# Patient Record
Sex: FEMALE | Race: WHITE | NOT HISPANIC OR LATINO | Employment: FULL TIME | URBAN - METROPOLITAN AREA
[De-identification: names, ages, dates, MRNs, and addresses within clinical notes are randomized per-mention and may not be internally consistent; named-entity substitution may affect disease eponyms.]

---

## 2017-01-06 ENCOUNTER — TELEPHONE (OUTPATIENT)
Dept: FAMILY MEDICINE | Facility: CLINIC | Age: 52
End: 2017-01-06

## 2017-01-06 DIAGNOSIS — Z12.31 ENCOUNTER FOR SCREENING MAMMOGRAM FOR BREAST CANCER: Primary | ICD-10-CM

## 2017-01-06 NOTE — TELEPHONE ENCOUNTER
----- Message from Marisol Membreno sent at 1/4/2017  3:14 PM CST -----  Contact: Self  Pt called to request order for mammogram. Pt wants to have mammo done at [facility]. Pt can be reached @ [phone].

## 2017-01-16 ENCOUNTER — HOSPITAL ENCOUNTER (OUTPATIENT)
Dept: RADIOLOGY | Facility: HOSPITAL | Age: 52
Discharge: HOME | End: 2017-01-16
Attending: FAMILY MEDICINE
Payer: COMMERCIAL

## 2017-01-16 DIAGNOSIS — Z12.31 ENCOUNTER FOR SCREENING MAMMOGRAM FOR BREAST CANCER: ICD-10-CM

## 2017-01-16 PROCEDURE — 77067 SCR MAMMO BI INCL CAD: CPT | Mod: 26,,, | Performed by: RADIOLOGY

## 2017-01-16 PROCEDURE — 77067 SCR MAMMO BI INCL CAD: CPT | Mod: TC

## 2017-01-16 PROCEDURE — 77063 BREAST TOMOSYNTHESIS BI: CPT | Mod: 26,,, | Performed by: RADIOLOGY

## 2017-01-18 ENCOUNTER — HOSPITAL ENCOUNTER (OUTPATIENT)
Dept: RADIOLOGY | Facility: HOSPITAL | Age: 52
Discharge: HOME | End: 2017-01-18
Attending: FAMILY MEDICINE
Payer: COMMERCIAL

## 2017-01-18 DIAGNOSIS — R92.8 ABNORMAL MAMMOGRAM: ICD-10-CM

## 2017-01-18 PROCEDURE — 77061 BREAST TOMOSYNTHESIS UNI: CPT | Mod: 26,,, | Performed by: RADIOLOGY

## 2017-01-18 PROCEDURE — 77065 DX MAMMO INCL CAD UNI: CPT | Mod: 26,,, | Performed by: RADIOLOGY

## 2017-01-18 PROCEDURE — 77065 DX MAMMO INCL CAD UNI: CPT | Mod: TC

## 2017-01-18 PROCEDURE — 77061 BREAST TOMOSYNTHESIS UNI: CPT | Mod: TC

## 2017-01-18 PROCEDURE — 76642 ULTRASOUND BREAST LIMITED: CPT | Mod: TC,RT

## 2017-01-18 PROCEDURE — 76642 ULTRASOUND BREAST LIMITED: CPT | Mod: 26,RT,, | Performed by: RADIOLOGY

## 2017-01-30 DIAGNOSIS — M54.16 RIGHT LUMBAR RADICULOPATHY: ICD-10-CM

## 2017-01-30 RX ORDER — OMEPRAZOLE 40 MG/1
CAPSULE, DELAYED RELEASE ORAL
Qty: 30 CAPSULE | Refills: 0 | Status: SHIPPED | OUTPATIENT
Start: 2017-01-30 | End: 2017-03-01 | Stop reason: SDUPTHER

## 2017-01-30 RX ORDER — GABAPENTIN 600 MG/1
TABLET ORAL
Qty: 90 TABLET | Refills: 1 | Status: SHIPPED | OUTPATIENT
Start: 2017-01-30 | End: 2017-03-31 | Stop reason: SDUPTHER

## 2017-02-16 ENCOUNTER — OFFICE VISIT (OUTPATIENT)
Dept: PHYSICAL MEDICINE AND REHAB | Facility: CLINIC | Age: 52
End: 2017-02-16
Payer: COMMERCIAL

## 2017-02-16 VITALS
DIASTOLIC BLOOD PRESSURE: 87 MMHG | WEIGHT: 180.75 LBS | HEIGHT: 64 IN | SYSTOLIC BLOOD PRESSURE: 135 MMHG | BODY MASS INDEX: 30.86 KG/M2 | HEART RATE: 59 BPM

## 2017-02-16 DIAGNOSIS — G89.29 CHRONIC MIDLINE LOW BACK PAIN WITH RIGHT-SIDED SCIATICA: Primary | ICD-10-CM

## 2017-02-16 DIAGNOSIS — M51.26 PROTRUDED LUMBAR DISC: ICD-10-CM

## 2017-02-16 DIAGNOSIS — M54.16 RIGHT LUMBAR RADICULOPATHY: ICD-10-CM

## 2017-02-16 DIAGNOSIS — M54.41 CHRONIC MIDLINE LOW BACK PAIN WITH RIGHT-SIDED SCIATICA: Primary | ICD-10-CM

## 2017-02-16 PROCEDURE — 3079F DIAST BP 80-89 MM HG: CPT | Mod: S$GLB,,, | Performed by: PHYSICAL MEDICINE & REHABILITATION

## 2017-02-16 PROCEDURE — 99999 PR PBB SHADOW E&M-EST. PATIENT-LVL III: CPT | Mod: PBBFAC,,, | Performed by: PHYSICAL MEDICINE & REHABILITATION

## 2017-02-16 PROCEDURE — 99214 OFFICE O/P EST MOD 30 MIN: CPT | Mod: S$GLB,,, | Performed by: PHYSICAL MEDICINE & REHABILITATION

## 2017-02-16 PROCEDURE — 3075F SYST BP GE 130 - 139MM HG: CPT | Mod: S$GLB,,, | Performed by: PHYSICAL MEDICINE & REHABILITATION

## 2017-02-16 NOTE — PROGRESS NOTES
Subjective:       Patient ID: Brianna Coughlin is a 51 y.o. female.    Chief Complaint: No chief complaint on file.    HPI     HISTORY OF PRESENT ILLNESS:  The patient is a 51-year-old white female who is   followed up in the [facility] Medicine Clinic for chronic low back pain with right   lumbar radiculopathy.  Her last visit was on 12/05/2016.  Her pain was getting   worse.  An MRI of the lumbar spine was ordered.  She was maintained on   gabapentin, p.r.n. Tylenol and p.r.n. baclofen.    The patient is coming today to the clinic for followup.  Her pain has been   waxing and waning, but recently worse.  It is an intermittent aching pain in the   lumbar spine and across her back.  She has occasional radiation to the right   foot with numbness.  Her pain is worse with activity and better with rest.  Her   maximum pain is 7-8/10 and minimum 2-3/10.  Today, it is 2-3/10.  The patient   denies any lower extremity weakness.  She denies any bowel or bladder   incontinence.    She is currently taking gabapentin 600 mg p.o. t.i.d.  She takes Tylenol 650 mg   p.r.n., usually a couple of times per day.  She took baclofen only once, but had   to stop it secondary to feeling weird.  In the past, she was on Cymbalta, but   had to stop it also secondary to mood changes.      MS/HN  dd: 02/16/2017 17:28:01 (CST)  td: 02/17/2017 06:50:28 (CST)  Doc ID   #[id number]  Job ID #[id number]    CC:           Review of Systems   Constitutional: Negative for fatigue.   Respiratory: Negative for shortness of breath.    Cardiovascular: Negative for chest pain.   Gastrointestinal: Negative for blood in stool, constipation, nausea and vomiting.   Genitourinary: Negative for difficulty urinating.   Musculoskeletal: Positive for back pain. Negative for arthralgias, gait problem and neck pain.   Skin: Negative for rash.   Neurological: Negative for dizziness and headaches.   Psychiatric/Behavioral: Positive for sleep disturbance. Negative for  behavioral problems.       Objective:      Physical Exam   Constitutional: She appears well-developed and well-nourished. No distress.   HENT:   Head: Normocephalic and atraumatic.   Neck: Normal range of motion.   Musculoskeletal:   BLE:  ROM:full.  - ve bilateral knee crepitus.   Strength:      RLE: 5/5 at hip flexion, knee extension, ankle DF/PF, EHL.     LLE:  5/5 at hip flexion, knee extension, ankle DF/PF, EHL.  Sensation to pinprick:     RLE: intact.      LLE: intact.   SLR:      RLE: +ve at 30 deg.      LLE: -ve at 70 deg.     Mild tenderness over lumbar spine.    Gait: WNL.   Neurological: She is alert.   Skin: Skin is warm.   Psychiatric: She has a normal mood and affect.   Vitals reviewed.        IMAGING STUDIES:    MRI OF THE LUMBAR SPINE WITHOUT CONTRAST (12/12/16):     Technique:  Sagittal T1, sagittal T2, sagittal STIR, axial T1 and axial T2 weighted images of the lumbar spine obtained without contrast.     Comparison: None.     Findings:    Lumbar spine alignment is within normal limits. The vertebral body heights are well maintained, with no fracture.  No marrow signal abnormality suspicious for an infiltrative process.      The conus is normal in appearance, and terminates at the L1-L2 level.   The common bile duct dilated to approximately 0.8 0.9 cm. The common bile duct has been noted to be prominent on prior imaging examinations.  Remaining soft tissue structures are unremarkable.    There is mild degenerative change of the lumbar spine which will be described on a level by level basis below:    T12-L1, L1-L2, L2-L3: Mild facet arthropathy. No significant spinal canal or neuroforaminal narrowing.    L4-L5: Redemonstration of possible left lateral disc protrusion. Bilateral facet arthropathy. No significant spinal canal stenosis right neural foraminal narrowing. There is mild left neural foraminal narrowing. Overall, findings similar to prior study.    L5-S1: Mild facet arthropathy. No  significant spinal canal or neural foraminal narrowing.    Impression      Mild left lateral disc protrusion at the L4-L5 level with mild left neuroforaminal narrowing, similar to prior examination of 2014.    Dilatation of the common bile duct, similar to prior examinations.          Assessment:       1. Chronic midline low back pain with right-sided sciatica    2. Right lumbar radiculopathy    3. Protruded lumbar disc (at Left L4-5)        Plan:       - MRI findings were discussed with the patient.  - Increase acetaminophen (TYLENOL) 650 MG tablet; Take 1 tablets (350 mg total) by mouth 3-4 times daily as needed for Pain.  - Increase gabapentin (NEURONTIN) 600 MG tablet; Take 1 tablet (600 mg total) by mouth qam, 1 qpm, 2 qhs.  - Discontinue baclofen (due to 'feeling weird' and mood change).  - Regular home exercise program was encouraged.  - Return in about 3 months (around 5/16/2017).    This was a 25 minute visit, more than 50% of which was spent counseling the patient about the diagnosis, the MRI finidings and the treatment plan including medication adjustment and possible referral for NICKO.

## 2017-03-01 DIAGNOSIS — I10 ESSENTIAL HYPERTENSION: ICD-10-CM

## 2017-03-01 DIAGNOSIS — G43.909 MIGRAINE SYNDROME: ICD-10-CM

## 2017-03-01 RX ORDER — NADOLOL 80 MG/1
TABLET ORAL
Qty: 30 TABLET | Refills: 0 | Status: SHIPPED | OUTPATIENT
Start: 2017-03-01 | End: 2017-04-04 | Stop reason: SDUPTHER

## 2017-03-01 RX ORDER — OMEPRAZOLE 40 MG/1
CAPSULE, DELAYED RELEASE ORAL
Qty: 30 CAPSULE | Refills: 0 | Status: SHIPPED | OUTPATIENT
Start: 2017-03-01 | End: 2017-04-04 | Stop reason: SDUPTHER

## 2017-03-03 LAB
HPV MRNA E6/E7: NOT DETECTED
PAP SMEAR: NORMAL

## 2017-03-07 ENCOUNTER — OFFICE VISIT (OUTPATIENT)
Dept: FAMILY MEDICINE | Facility: CLINIC | Age: 52
End: 2017-03-07
Payer: COMMERCIAL

## 2017-03-07 VITALS
OXYGEN SATURATION: 95 % | DIASTOLIC BLOOD PRESSURE: 82 MMHG | BODY MASS INDEX: 30.93 KG/M2 | HEART RATE: 56 BPM | TEMPERATURE: 98 F | WEIGHT: 181.19 LBS | HEIGHT: 64 IN | SYSTOLIC BLOOD PRESSURE: 120 MMHG

## 2017-03-07 DIAGNOSIS — K04.7 DENTAL ABSCESS: Primary | ICD-10-CM

## 2017-03-07 PROCEDURE — 1160F RVW MEDS BY RX/DR IN RCRD: CPT | Mod: S$GLB,,, | Performed by: FAMILY MEDICINE

## 2017-03-07 PROCEDURE — 3079F DIAST BP 80-89 MM HG: CPT | Mod: S$GLB,,, | Performed by: FAMILY MEDICINE

## 2017-03-07 PROCEDURE — 3074F SYST BP LT 130 MM HG: CPT | Mod: S$GLB,,, | Performed by: FAMILY MEDICINE

## 2017-03-07 PROCEDURE — 99999 PR PBB SHADOW E&M-EST. PATIENT-LVL III: CPT | Mod: PBBFAC,,, | Performed by: FAMILY MEDICINE

## 2017-03-07 PROCEDURE — 99214 OFFICE O/P EST MOD 30 MIN: CPT | Mod: S$GLB,,, | Performed by: FAMILY MEDICINE

## 2017-03-07 RX ORDER — AZITHROMYCIN 250 MG/1
TABLET, FILM COATED ORAL
Qty: 6 TABLET | Refills: 0 | Status: SHIPPED | OUTPATIENT
Start: 2017-03-07 | End: 2017-03-12

## 2017-03-07 NOTE — PROGRESS NOTES
" Office Visit    Patient Name: Brianna Coughlin    : 1965  MRN: [id number]    Subjective:  Brianna is a 51 y.o. female who presents today for:    Sinus Problem      This patient has multiple medical diagnoses as noted below.  This patient is new to me, but known to the clinic.  She reports sinus issues.  She has had numbness and tingling on the left side of her face. It does not feel right. No prior episodes.  She reports a "weird sensation.  She reports she does have sinus issues.  She used sudafed on .  She has had these symptoms for about a week.  No facial weakness. Sensation is the same on both sides.  +facial swelling that occurred this AM (puffy).  She has nasal congestion and nasal dripping.  +sneezing.  No noted drainage from the ear.        Active Problem List  Patient Active Problem List   Diagnosis    Undiagnosed cardiac murmurs    Lumbago    Anxiety    GERD (gastroesophageal reflux disease)    Personal history of cervical dysplasia    Fibroids    Dermatophytosis of nail    Protruded lumbar disc (at Left L4-5)    Right lumbar radiculopathy    Chronic low back pain    Fibromyalgia    Essential hypertension    Migraine syndrome    FHx: coronary artery disease    H/O: duodenal ulcer    Ang's neuroma of left foot 2nd space       Past Surgical History  Past Surgical History:   Procedure Laterality Date    CHOLECYSTECTOMY      conization of cervix      TUBAL LIGATION         Family History  Family History   Problem Relation Age of Onset    Hypertension Mother     Heart disease Mother     Heart disease Father     Stroke Maternal Grandfather        Social History  Social History     Social History    Marital status: Single     Spouse name: N/A    Number of children: N/A    Years of education: N/A     Occupational History    Not on file.     Social History Main Topics    Smoking status: Never Smoker    Smokeless tobacco: Never Used    Alcohol use 0.0 oz/week     " 0 Standard drinks or equivalent per week      Comment: socially    Drug use: No    Sexual activity: Not Currently     Partners: Male     Other Topics Concern    Not on file     Social History Narrative       Current Medications  Medications reviewed and updated.     Allergies   Review of patient's allergies indicates:   Allergen Reactions    Acetaminophen-codeine Other (See Comments)     Stomach pains    Mobic [meloxicam] Other (See Comments)     STOMACH ULCERS    Iodine Other (See Comments)    Penicillins Nausea And Vomiting    Sulfa (sulfonamide antibiotics) Nausea And Vomiting     Other reaction(s): Rash    Unable to assess      MEDICINES CONTAINING ASPIRIN AFFECT HER STOMACH/STOMACH ULCER       Review of Systems (Pertinent positives)  Review of Systems   Constitutional: Negative for activity change, appetite change, fatigue, fever and unexpected weight change.   HENT: Positive for congestion, ear pain, facial swelling, postnasal drip, sinus pressure and sneezing. Negative for ear discharge, hearing loss, nosebleeds, rhinorrhea and sore throat.    Eyes: Negative.    Respiratory: Negative for apnea, cough, chest tightness, shortness of breath and wheezing.    Cardiovascular: Negative for chest pain, palpitations and leg swelling.   Gastrointestinal: Negative for abdominal distention, abdominal pain, constipation, diarrhea and vomiting.   Endocrine: Negative for cold intolerance, heat intolerance, polydipsia and polyuria.   Genitourinary: Negative for decreased urine volume, menstrual problem, urgency, vaginal bleeding, vaginal discharge and vaginal pain.   Musculoskeletal: Negative.    Skin: Negative for rash.   Neurological: Negative for dizziness and headaches.   Hematological: Does not bruise/bleed easily.   Psychiatric/Behavioral: Negative for agitation, sleep disturbance and suicidal ideas.       /82 (BP Location: Left arm, Patient Position: Sitting, BP Method: Manual)  Pulse (!) 56  Temp  "98.2 °F (36.8 °C) (Oral)   Ht 5' 4" (1.626 m)  Wt 82.2 kg (181 lb 3.5 oz)  SpO2 95%  BMI 31.11 kg/m2    Physical Exam   Constitutional: She is oriented to person, place, and time. She appears well-developed and well-nourished.   HENT:   Head: Normocephalic and atraumatic.   Right Ear: External ear normal.   Left Ear: External ear normal.   Nose: Nose normal.   Mouth/Throat: Oropharynx is clear and moist. Dental abscesses and dental caries present.       Eyes: Conjunctivae and EOM are normal. Pupils are equal, round, and reactive to light.   Neurological: She is alert and oriented to person, place, and time.   Skin: Skin is warm and dry.   Psychiatric: She has a normal mood and affect. Her behavior is normal.   Vitals reviewed.      Health Maintenance  Health Maintenance Topics with due status: Not Due       Topic Last Completion Date    Pap Smear 12/12/2014    Lipid Panel 02/23/2016    Mammogram 01/18/2017       Assessment/Plan:  Brianna Coughlin is a 51 y.o. female who presents today for :    Brianna was seen today for sinus problem.    Diagnoses and all orders for this visit:    Dental abscess  -     azithromycin (Z-ANDRÉS) 250 MG tablet; Take 2 tablets by mouth on day 1; Take 1 tablet by mouth on days 2-5  -    I have discussed the common side effects of this medication with the patient and answered all of the questions they had at the time of this visit regarding this medication.  -  RTC if symptoms worsen or persist.      No Follow-up on file.  "

## 2017-03-07 NOTE — MR AVS SNAPSHOT
[facility]  [address]  [place] [address]  Phone: [phone]  Fax: [phone]                  Brianna Couglhin   3/7/2017 10:00 AM   Office Visit    Description:  Female : 1965   Provider:  Yas Mcclendon MD   Department:  [facility]           Reason for Visit     Sinus Problem           Diagnoses this Visit        Comments    Dental abscess    -  Primary            To Do List           Future Appointments        Provider Department Dept Phone    2017 4:00 PM eBlle Fall MD [facility] [phone]      Goals (5 Years of Data)     None       These Medications        Disp Refills Start End    azithromycin (Z-ANDRÉS) 250 MG tablet 6 tablet 0 3/7/2017 3/12/2017    Take 2 tablets by mouth on day 1; Take 1 tablet by mouth on days 2-5    Pharmacy: [address] [place] Ph #: [phone]         [facility] On Call     [facility] On Call Nurse Care Line -  Assistance  Registered nurses in the [facility] On Call Center provide clinical advisement, health education, appointment booking, and other advisory services.  Call for this free service at [phone].             Medications           Message regarding Medications     Verify the changes and/or additions to your medication regime listed below are the same as discussed with your clinician today.  If any of these changes or additions are incorrect, please notify your healthcare provider.        START taking these NEW medications        Refills    azithromycin (Z-ANDRÉS) 250 MG tablet 0    Sig: Take 2 tablets by mouth on day 1; Take 1 tablet by mouth on days 2-5    Class: Normal           Verify that the below list of medications is an accurate representation of the medications you are currently taking.  If none reported, the list may be blank. If incorrect, please contact your healthcare provider. Carry this list with you in case of  "emergency.           Current Medications     acetaminophen (TYLENOL) 500 MG tablet Take 1-2 tablets (500-1,000 mg total) by mouth 3 (three) times daily as needed for Pain.    alprazolam (XANAX) 0.5 MG tablet TAKE ONE TABLET AS DIRECTED AS NEEDED    gabapentin (NEURONTIN) 600 MG tablet TAKE ONE TABLET BY MOUTH THREE TIMES DAILY    nadolol (CORGARD) 80 MG tablet TAKE ONE TABLET BY MOUTH DAILY    omeprazole (PRILOSEC) 40 MG capsule TAKE ONE CAPSULE BY MOUTH DAILY    azithromycin (Z-ANDRÉS) 250 MG tablet Take 2 tablets by mouth on day 1; Take 1 tablet by mouth on days 2-5    baclofen (LIORESAL) 10 MG tablet Take 1 tablet (10 mg total) by mouth 3 (three) times daily as needed (muscle spasms).    calcium carb &cit-vit D3 (CITRACAL + D SLOW RELEASE) 600 mg calcium- 500 unit TbSR Take 1 tablet by mouth Daily.    cetirizine (ZYRTEC) 10 MG tablet Take 10 mg by mouth once daily.    diclofenac sodium 1 % Gel Apply 2 g topically 4 (four) times daily.           Clinical Reference Information           Your Vitals Were     BP Pulse Temp Height Weight SpO2    120/82 (BP Location: Left arm, Patient Position: Sitting, BP Method: Manual) 56 98.2 °F (36.8 °C) (Oral) 5' 4" (1.626 m) 82.2 kg (181 lb 3.5 oz) 95%    BMI                31.11 kg/m2          Blood Pressure          Most Recent Value    BP  120/82      Allergies as of 3/7/2017     Acetaminophen-codeine    Mobic [Meloxicam]    Iodine    Penicillins    Sulfa (Sulfonamide Antibiotics)    Unable To Assess      Immunizations Administered on Date of Encounter - 3/7/2017     None      [facility] Sign-Up     Activating your [facility] account is as easy as 1-2-3!     1) Visit [email or web address], select Sign Up Now, enter this activation code and your date of birth, then select Next.  [email or web address]  Expires: 4/21/2017 10:42 AM      2) Create a username and password to use when you visit [facility] in the future and select a security question in case you lose your password and select " Next.    3) Enter your e-mail address and click Sign Up!    Additional Information  If you have questions, please e-mail [email or web address] or call [phone] to talk to our [facility] staff. Remember, [facility] is NOT to be used for urgent needs. For medical emergencies, dial 911.         Language Assistance Services     ATTENTION: Language assistance services are available, free of charge. Please call [phone].      ATENCIÓN: Si habla abidatamiko, tiene a rubio disposición servicios gratuitos de asistencia lingüística. Llame al [phone].     CHÚ Ý: N?u b?n nói Ti?ng Vi?t, có các d?ch v? h? tr? ngôn ng? mi?n phí dành cho b?n. G?i s? [phone].         [facility] complies with applicable Federal civil rights laws and does not discriminate on the basis of race, color, national origin, age, disability, or sex.

## 2017-03-31 DIAGNOSIS — M54.16 RIGHT LUMBAR RADICULOPATHY: ICD-10-CM

## 2017-03-31 RX ORDER — GABAPENTIN 600 MG/1
TABLET ORAL
Qty: 90 TABLET | Refills: 1 | Status: SHIPPED | OUTPATIENT
Start: 2017-03-31 | End: 2017-06-01 | Stop reason: SDUPTHER

## 2017-04-04 DIAGNOSIS — K21.9 GASTROESOPHAGEAL REFLUX DISEASE, ESOPHAGITIS PRESENCE NOT SPECIFIED: Primary | ICD-10-CM

## 2017-04-04 DIAGNOSIS — G43.909 MIGRAINE SYNDROME: ICD-10-CM

## 2017-04-04 DIAGNOSIS — I10 ESSENTIAL HYPERTENSION: ICD-10-CM

## 2017-04-04 NOTE — TELEPHONE ENCOUNTER
----- Message from Jennifer Larsen sent at 4/4/2017  1:08 PM CDT -----  Contact: Self  Pt calling to speak to a nurse regarding the medication below. Please call [phone].    omeprazole (PRILOSEC) 40 MG capsule  nadolol (CORGARD) 80 MG tablet

## 2017-04-05 RX ORDER — NADOLOL 80 MG/1
80 TABLET ORAL DAILY
Qty: 30 TABLET | Refills: 3 | Status: SHIPPED | OUTPATIENT
Start: 2017-04-05 | End: 2017-08-17 | Stop reason: SDUPTHER

## 2017-04-05 RX ORDER — OMEPRAZOLE 40 MG/1
40 CAPSULE, DELAYED RELEASE ORAL DAILY
Qty: 30 CAPSULE | Refills: 3 | Status: SHIPPED | OUTPATIENT
Start: 2017-04-05 | End: 2017-08-17 | Stop reason: SDUPTHER

## 2017-06-01 DIAGNOSIS — M54.16 RIGHT LUMBAR RADICULOPATHY: ICD-10-CM

## 2017-06-01 RX ORDER — GABAPENTIN 600 MG/1
TABLET ORAL
Qty: 90 TABLET | Refills: 1 | Status: SHIPPED | OUTPATIENT
Start: 2017-06-01 | End: 2017-08-03 | Stop reason: SDUPTHER

## 2017-08-03 DIAGNOSIS — M54.16 RIGHT LUMBAR RADICULOPATHY: ICD-10-CM

## 2017-08-03 RX ORDER — GABAPENTIN 600 MG/1
TABLET ORAL
Qty: 90 TABLET | Refills: 1 | Status: SHIPPED | OUTPATIENT
Start: 2017-08-03 | End: 2017-09-21 | Stop reason: SDUPTHER

## 2017-08-17 DIAGNOSIS — G43.909 MIGRAINE SYNDROME: ICD-10-CM

## 2017-08-17 DIAGNOSIS — I10 ESSENTIAL HYPERTENSION: ICD-10-CM

## 2017-08-17 DIAGNOSIS — K21.9 GASTROESOPHAGEAL REFLUX DISEASE, ESOPHAGITIS PRESENCE NOT SPECIFIED: ICD-10-CM

## 2017-08-17 RX ORDER — NADOLOL 80 MG/1
TABLET ORAL
Qty: 30 TABLET | Refills: 0 | Status: SHIPPED | OUTPATIENT
Start: 2017-08-17 | End: 2017-09-21 | Stop reason: SDUPTHER

## 2017-08-17 RX ORDER — OMEPRAZOLE 40 MG/1
CAPSULE, DELAYED RELEASE ORAL
Qty: 30 CAPSULE | Refills: 0 | Status: SHIPPED | OUTPATIENT
Start: 2017-08-17 | End: 2017-09-21 | Stop reason: SDUPTHER

## 2017-09-21 DIAGNOSIS — I10 ESSENTIAL HYPERTENSION: ICD-10-CM

## 2017-09-21 DIAGNOSIS — G43.909 MIGRAINE SYNDROME: ICD-10-CM

## 2017-09-21 DIAGNOSIS — M54.16 RIGHT LUMBAR RADICULOPATHY: ICD-10-CM

## 2017-09-21 DIAGNOSIS — K21.9 GASTROESOPHAGEAL REFLUX DISEASE, ESOPHAGITIS PRESENCE NOT SPECIFIED: ICD-10-CM

## 2017-09-21 RX ORDER — GABAPENTIN 600 MG/1
TABLET ORAL
Qty: 90 TABLET | Refills: 1 | Status: SHIPPED | OUTPATIENT
Start: 2017-09-21 | End: 2017-10-02 | Stop reason: SDUPTHER

## 2017-09-22 RX ORDER — OMEPRAZOLE 40 MG/1
CAPSULE, DELAYED RELEASE ORAL
Qty: 30 CAPSULE | Refills: 0 | Status: SHIPPED | OUTPATIENT
Start: 2017-09-22 | End: 2017-10-19 | Stop reason: SDUPTHER

## 2017-09-22 RX ORDER — NADOLOL 80 MG/1
TABLET ORAL
Qty: 30 TABLET | Refills: 0 | Status: SHIPPED | OUTPATIENT
Start: 2017-09-22 | End: 2017-10-19 | Stop reason: SDUPTHER

## 2017-10-02 ENCOUNTER — TELEPHONE (OUTPATIENT)
Dept: PHYSICAL MEDICINE AND REHAB | Facility: CLINIC | Age: 52
End: 2017-10-02

## 2017-10-02 DIAGNOSIS — M54.16 RIGHT LUMBAR RADICULOPATHY: ICD-10-CM

## 2017-10-02 RX ORDER — GABAPENTIN 600 MG/1
600 TABLET ORAL 4 TIMES DAILY
Qty: 120 TABLET | Refills: 1 | Status: SHIPPED | OUTPATIENT
Start: 2017-10-02 | End: 2017-11-20 | Stop reason: SDUPTHER

## 2017-10-02 NOTE — TELEPHONE ENCOUNTER
----- Message from Belle Fall MD sent at 10/2/2017  4:02 PM CDT -----  Contact: self  Pls let her know I sent a refill for 120 tablets to [facility].  ----- Message -----  From: Yen Cornelius MA  Sent: 10/2/2017   2:06 PM  To: Belle Fall MD        ----- Message -----  From: Stu Whitfield  Sent: 10/2/2017  11:57 AM  To: Juan [facility] Staff    Pt is requesting qty to be changed on medication  gabapentin (NEURONTIN) 600 MG tablet per pt currently she TAKE ONE TABLET BY MOUTH FOUR TIMES DAILY and would like to have qty to change from 90 tabs to 120 tabs.. Pt pharmacy is  [id number] - [place], [place] - [address].....the patient can be reached at [phone]

## 2017-10-05 ENCOUNTER — TELEPHONE (OUTPATIENT)
Dept: PHYSICAL MEDICINE AND REHAB | Facility: CLINIC | Age: 52
End: 2017-10-05

## 2017-10-05 ENCOUNTER — HOSPITAL ENCOUNTER (OUTPATIENT)
Dept: RADIOLOGY | Facility: HOSPITAL | Age: 52
Discharge: HOME | End: 2017-10-05
Attending: NURSE PRACTITIONER
Payer: COMMERCIAL

## 2017-10-05 ENCOUNTER — OFFICE VISIT (OUTPATIENT)
Dept: FAMILY MEDICINE | Facility: CLINIC | Age: 52
End: 2017-10-05
Payer: OTHER MISCELLANEOUS

## 2017-10-05 VITALS
BODY MASS INDEX: 31.27 KG/M2 | DIASTOLIC BLOOD PRESSURE: 88 MMHG | TEMPERATURE: 98 F | SYSTOLIC BLOOD PRESSURE: 124 MMHG | HEART RATE: 58 BPM | WEIGHT: 183.19 LBS | OXYGEN SATURATION: 97 % | HEIGHT: 64 IN

## 2017-10-05 DIAGNOSIS — S39.92XA INJURY OF LOW BACK, INITIAL ENCOUNTER: Primary | ICD-10-CM

## 2017-10-05 DIAGNOSIS — S30.0XXA LUMBAR CONTUSION, INITIAL ENCOUNTER: ICD-10-CM

## 2017-10-05 DIAGNOSIS — S39.92XA INJURY OF LOW BACK, INITIAL ENCOUNTER: ICD-10-CM

## 2017-10-05 PROCEDURE — 99214 OFFICE O/P EST MOD 30 MIN: CPT | Mod: S$GLB,,, | Performed by: NURSE PRACTITIONER

## 2017-10-05 PROCEDURE — 99999 PR PBB SHADOW E&M-EST. PATIENT-LVL IV: CPT | Mod: PBBFAC,,, | Performed by: NURSE PRACTITIONER

## 2017-10-05 PROCEDURE — 72110 X-RAY EXAM L-2 SPINE 4/>VWS: CPT | Mod: 26,,, | Performed by: RADIOLOGY

## 2017-10-05 PROCEDURE — 72110 X-RAY EXAM L-2 SPINE 4/>VWS: CPT | Mod: TC,PO

## 2017-10-05 RX ORDER — CYCLOBENZAPRINE HCL 10 MG
10 TABLET ORAL 3 TIMES DAILY PRN
Qty: 30 TABLET | Refills: 0 | Status: SHIPPED | OUTPATIENT
Start: 2017-10-05 | End: 2017-10-15

## 2017-10-05 RX ORDER — TRAMADOL HYDROCHLORIDE 50 MG/1
50 TABLET ORAL EVERY 6 HOURS PRN
Qty: 30 TABLET | Refills: 0 | Status: SHIPPED | OUTPATIENT
Start: 2017-10-05 | End: 2017-10-15

## 2017-10-05 NOTE — PATIENT INSTRUCTIONS
Back Contusion     You have a contusion to your back. A contusion is also called a bruise. There is swelling and some bleeding under the skin. The skin may be purplish. You may have muscle aching and stiffness in the area of the bruise. There are no broken bones.  Contusions heal on their own, without further treatment. However, pain and skin discoloration may take weeks to months to go away.   Home care  · Rest. Avoid heavy lifting, strenuous exertion, or any activity that causes pain.  · Ice the area to reduce pain and swelling. Put ice cubes in a plastic bag or use a cold pack. (Wrap the cold source in a thin towel. Do not place it directly on your skin.) Ice the injured area for 20 minutes every 1-2 hours the first day. Continue with ice packs 3-4 times a day for the next two days, then as needed for the relief of pain and swelling.  · Take any prescribed pain medication. If none was prescribed, take acetaminophen, ibuprofen, or naproxen to control pain.  Follow-up care  Follow up with your healthcare provider, or as directed. Call if you are not better in 1-2 weeks.  When to seek medical advice  Call your healthcare provider for any of the following:  · New or worsening pain  · Increased swelling around the bruise  · Pain spreads to one or both legs  · Weakness or numbness in one or both legs   · Loss of bowel or bladder control  · Numbness in the groin or genital area  · Fever of 100.4°F (38ºC) or higher, or as directed by your healthcare provider  Date Last Reviewed: 6/26/2015 © 2000-2017 [employer]. [address] [place] [address], [place] [address]. All rights reserved. This information is not intended as a substitute for professional medical care. Always follow your healthcare professional's instructions.

## 2017-10-05 NOTE — TELEPHONE ENCOUNTER
----- Message from Yen Cornelius MA sent at 10/5/2017 10:53 AM CDT -----  Contact: self @ [phone]      ----- Message -----  From: Justyna Boyd  Sent: 10/5/2017   8:55 AM  To: Juan [facility] Staff    Pt says she injured herself yesterday and needs to see Dr Fall today.  Pls call asap.
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
## 2017-10-05 NOTE — MEDICAL/APP STUDENT
Subjective:       Patient ID: Brianna Coughlin is a 52 y.o. female.    Chief Complaint: Sciatica    Back Pain   This is a new problem. The current episode started in the past 7 days. The problem occurs constantly. The problem has been gradually worsening since onset. The pain is present in the lumbar spine. The quality of the pain is described as aching. The pain does not radiate. The pain is at a severity of 7/10. The pain is moderate. The pain is the same all the time. The symptoms are aggravated by position and bending. Stiffness is present all day. Associated symptoms include leg pain. Pertinent negatives include no bladder incontinence, bowel incontinence, chest pain, dysuria, headaches, numbness, paresis, paresthesias, pelvic pain, perianal numbness, tingling, weakness or weight loss. She has tried analgesics (gabapentin and tylenol) for the symptoms. The treatment provided mild relief.     Review of Systems   Constitutional: Negative for weight loss.   Respiratory: Negative for cough, shortness of breath and wheezing.    Cardiovascular: Negative for chest pain and palpitations.   Gastrointestinal: Negative for bowel incontinence.   Genitourinary: Negative for bladder incontinence, dysuria, flank pain and pelvic pain.   Musculoskeletal: Positive for back pain. Negative for arthralgias, gait problem, joint swelling, myalgias, neck pain and neck stiffness.   Neurological: Negative for dizziness, tingling, tremors, weakness, light-headedness, numbness, headaches and paresthesias.       Objective:      Physical Exam   Constitutional: She appears well-developed and well-nourished.   HENT:   Head: Normocephalic and atraumatic.   Right Ear: External ear normal.   Left Ear: External ear normal.   Nose: Nose normal.   Mouth/Throat: Uvula is midline, oropharynx is clear and moist and mucous membranes are normal.   Eyes: Conjunctivae, EOM and lids are normal.   Neck: Normal range of motion and full passive range of  motion without pain. Neck supple.   Cardiovascular: Regular rhythm and normal heart sounds.  Bradycardia present.    Pulmonary/Chest: Effort normal and breath sounds normal.   Abdominal: Soft.   Musculoskeletal:        Lumbar back: She exhibits tenderness, bony tenderness and pain. She exhibits normal range of motion, no swelling, no edema, no deformity, no laceration, no spasm and normal pulse.   Neurological: She is alert.   Skin: Skin is warm, dry and intact.   Psychiatric: She has a normal mood and affect.   Nursing note and vitals reviewed.      Assessment:       1. Injury of low back, initial encounter    2. Lumbar contusion, initial encounter      Plan:       Brianna was seen today for sciatica.    Diagnoses and all orders for this visit:    Injury of low back, initial encounter  -     X-Ray Lumbar Spine Complete 5 View; Future  -     cyclobenzaprine (FLEXERIL) 10 MG tablet; Take 1 tablet (10 mg total) by mouth 3 (three) times daily as needed for Muscle spasms.  -     tramadol (ULTRAM) 50 mg tablet; Take 1 tablet (50 mg total) by mouth every 6 (six) hours as needed for Pain.    Lumbar contusion, initial encounter  -     cyclobenzaprine (FLEXERIL) 10 MG tablet; Take 1 tablet (10 mg total) by mouth 3 (three) times daily as needed for Muscle spasms.  -     tramadol (ULTRAM) 50 mg tablet; Take 1 tablet (50 mg total) by mouth every 6 (six) hours as needed for Pain.    Home care  · Rest. Avoid heavy lifting, strenuous exertion, or any activity that causes pain.  · Ice the area to reduce pain and swelling. Put ice cubes in a plastic bag or use a cold pack. (Wrap the cold source in a thin towel. Do not place it directly on your skin.) Ice the injured area for 20 minutes every 1-2 hours the first day. Continue with ice packs 3-4 times a day for the next two days, then as needed for the relief of pain and swelling.  · Take any prescribed pain medication. If none was prescribed, take acetaminophen, ibuprofen, or  naproxen to control pain.  Follow-up care  Follow up with your healthcare provider, or as directed. Call if you are not better in 1-2 weeks.  When to seek medical advice  Call your healthcare provider for any of the following:  · New or worsening pain  · Increased swelling around the bruise  · Pain spreads to one or both legs  · Weakness or numbness in one or both legs   · Loss of bowel or bladder control  · Numbness in the groin or genital area  · Fever of 100.4°F (38ºC) or higher, or as directed by your healthcare provider  Date Last Reviewed: 6/26/2015  © 5009-3542 [employer]. [address] [place] [address], [place] [address]. All rights reserved. This information is not intended as a substitute for professional medical care. Always follow your healthcare professional's instructions.        Return if symptoms worsen or fail to improve.

## 2017-10-11 NOTE — PROGRESS NOTES
Subjective:       Patient ID: Brianna Coughlin is a 52 y.o. female.    Chief Complaint: Sciatica    Back Pain   This is a new problem. The current episode started in the past 7 days. The problem occurs constantly. The problem has been gradually worsening since onset. The pain is present in the lumbar spine. The quality of the pain is described as aching. The pain does not radiate. The pain is at a severity of 7/10. The pain is moderate. The pain is the same all the time. The symptoms are aggravated by position and bending. Stiffness is present all day. Associated symptoms include leg pain. Pertinent negatives include no bladder incontinence, bowel incontinence, chest pain, dysuria, headaches, numbness, paresis, paresthesias, pelvic pain, perianal numbness, tingling, weakness or weight loss. She has tried analgesics (gabapentin and tylenol) for the symptoms. The treatment provided mild relief.     Review of Systems   Constitutional: Negative for weight loss.   Respiratory: Negative for cough, shortness of breath and wheezing.    Cardiovascular: Negative for chest pain and palpitations.   Gastrointestinal: Negative for bowel incontinence.   Genitourinary: Negative for bladder incontinence, dysuria, flank pain and pelvic pain.   Musculoskeletal: Positive for back pain. Negative for arthralgias, gait problem, joint swelling, myalgias, neck pain and neck stiffness.   Neurological: Negative for dizziness, tingling, tremors, weakness, light-headedness, numbness, headaches and paresthesias.       Objective:      Physical Exam   Constitutional: She appears well-developed and well-nourished.   HENT:   Head: Normocephalic and atraumatic.   Right Ear: External ear normal.   Left Ear: External ear normal.   Nose: Nose normal.   Mouth/Throat: Uvula is midline, oropharynx is clear and moist and mucous membranes are normal.   Eyes: Conjunctivae, EOM and lids are normal.   Neck: Normal range of motion and full passive range of  motion without pain. Neck supple.   Cardiovascular: Regular rhythm and normal heart sounds.  Bradycardia present.    Pulmonary/Chest: Effort normal and breath sounds normal.   Abdominal: Soft.   Musculoskeletal:        Lumbar back: She exhibits tenderness, bony tenderness and pain. She exhibits normal range of motion, no swelling, no edema, no deformity, no laceration, no spasm and normal pulse.   Neurological: She is alert.   Skin: Skin is warm, dry and intact.   Psychiatric: She has a normal mood and affect.   Nursing note and vitals reviewed.      Assessment:       1. Injury of low back, initial encounter    2. Lumbar contusion, initial encounter      Plan:       Brianna was seen today for sciatica.    Diagnoses and all orders for this visit:    Injury of low back, initial encounter  -     X-Ray Lumbar Spine Complete 5 View; Future  -     cyclobenzaprine (FLEXERIL) 10 MG tablet; Take 1 tablet (10 mg total) by mouth 3 (three) times daily as needed for Muscle spasms.  -     tramadol (ULTRAM) 50 mg tablet; Take 1 tablet (50 mg total) by mouth every 6 (six) hours as needed for Pain.    Lumbar contusion, initial encounter  -     cyclobenzaprine (FLEXERIL) 10 MG tablet; Take 1 tablet (10 mg total) by mouth 3 (three) times daily as needed for Muscle spasms.  -     tramadol (ULTRAM) 50 mg tablet; Take 1 tablet (50 mg total) by mouth every 6 (six) hours as needed for Pain.    Home care  · Rest. Avoid heavy lifting, strenuous exertion, or any activity that causes pain.  · Ice the area to reduce pain and swelling. Put ice cubes in a plastic bag or use a cold pack. (Wrap the cold source in a thin towel. Do not place it directly on your skin.) Ice the injured area for 20 minutes every 1-2 hours the first day. Continue with ice packs 3-4 times a day for the next two days, then as needed for the relief of pain and swelling.  · Take any prescribed pain medication. If none was prescribed, take acetaminophen, ibuprofen, or  naproxen to control pain.  Follow-up care  Follow up with your healthcare provider, or as directed. Call if you are not better in 1-2 weeks.  When to seek medical advice  Call your healthcare provider for any of the following:  · New or worsening pain  · Increased swelling around the bruise  · Pain spreads to one or both legs  · Weakness or numbness in one or both legs   · Loss of bowel or bladder control  · Numbness in the groin or genital area  · Fever of 100.4°F (38ºC) or higher, or as directed by your healthcare provider  Date Last Reviewed: 6/26/2015  © 4003-1868 [employer]. [address] [place] [address], [place] [address]. All rights reserved. This information is not intended as a substitute for professional medical care. Always follow your healthcare professional's instructions.        Return if symptoms worsen or fail to improve.

## 2017-10-19 DIAGNOSIS — G43.909 MIGRAINE SYNDROME: ICD-10-CM

## 2017-10-19 DIAGNOSIS — I10 ESSENTIAL HYPERTENSION: ICD-10-CM

## 2017-10-19 DIAGNOSIS — K21.9 GASTROESOPHAGEAL REFLUX DISEASE, ESOPHAGITIS PRESENCE NOT SPECIFIED: ICD-10-CM

## 2017-10-20 RX ORDER — OMEPRAZOLE 40 MG/1
CAPSULE, DELAYED RELEASE ORAL
Qty: 30 CAPSULE | Refills: 2 | Status: SHIPPED | OUTPATIENT
Start: 2017-10-20 | End: 2018-02-01 | Stop reason: SDUPTHER

## 2017-10-20 RX ORDER — NADOLOL 80 MG/1
TABLET ORAL
Qty: 30 TABLET | Refills: 2 | Status: SHIPPED | OUTPATIENT
Start: 2017-10-20 | End: 2018-02-01 | Stop reason: SDUPTHER

## 2017-11-03 DIAGNOSIS — G89.29 CHRONIC LOW BACK PAIN: ICD-10-CM

## 2017-11-03 DIAGNOSIS — M54.50 CHRONIC LOW BACK PAIN: ICD-10-CM

## 2017-11-03 DIAGNOSIS — M77.12 LATERAL EPICONDYLITIS OF LEFT ELBOW: ICD-10-CM

## 2017-11-03 RX ORDER — DICLOFENAC SODIUM 10 MG/G
GEL TOPICAL
Qty: 300 G | Refills: 2 | Status: SHIPPED | OUTPATIENT
Start: 2017-11-03 | End: 2018-04-13

## 2017-11-09 RX ORDER — ALPRAZOLAM 0.5 MG/1
TABLET ORAL
Qty: 30 TABLET | Refills: 2 | Status: SHIPPED | OUTPATIENT
Start: 2017-11-09 | End: 2018-12-11 | Stop reason: SDUPTHER

## 2017-11-09 NOTE — TELEPHONE ENCOUNTER
----- Message from Sarah Bowers sent at 11/9/2017  2:27 PM CST -----  Patient is asking if medication ALPRAZolam (XANAX) 0.5 MG tablet could be called into [facility]. Please call at [phone] Thank you!

## 2017-11-10 ENCOUNTER — TELEPHONE (OUTPATIENT)
Dept: FAMILY MEDICINE | Facility: CLINIC | Age: 52
End: 2017-11-10

## 2017-11-10 DIAGNOSIS — B00.1 RECURRENT COLD SORES: Primary | ICD-10-CM

## 2017-11-10 RX ORDER — VALACYCLOVIR HYDROCHLORIDE 1 G/1
2000 TABLET, FILM COATED ORAL EVERY 12 HOURS
Qty: 4 TABLET | Refills: 0 | Status: SHIPPED | OUTPATIENT
Start: 2017-11-10 | End: 2019-08-28

## 2017-11-10 NOTE — TELEPHONE ENCOUNTER
----- Message from Marisol Membreno sent at 11/10/2017  8:42 AM CST -----  Contact: Self  Pt states she has a fever blister and wants to know if she can get a Rx for Valtrex sent to [facility]. Pt can be reached @ [phone].

## 2017-11-20 DIAGNOSIS — M54.16 RIGHT LUMBAR RADICULOPATHY: ICD-10-CM

## 2017-11-20 RX ORDER — GABAPENTIN 600 MG/1
TABLET ORAL
Qty: 120 TABLET | Refills: 1 | Status: SHIPPED | OUTPATIENT
Start: 2017-11-20 | End: 2018-01-12 | Stop reason: SDUPTHER

## 2017-12-27 ENCOUNTER — OFFICE VISIT (OUTPATIENT)
Dept: URGENT CARE | Facility: CLINIC | Age: 52
End: 2017-12-27
Payer: COMMERCIAL

## 2017-12-27 VITALS
DIASTOLIC BLOOD PRESSURE: 87 MMHG | HEART RATE: 98 BPM | HEIGHT: 64 IN | SYSTOLIC BLOOD PRESSURE: 147 MMHG | OXYGEN SATURATION: 99 % | WEIGHT: 183 LBS | RESPIRATION RATE: 18 BRPM | BODY MASS INDEX: 31.24 KG/M2 | TEMPERATURE: 103 F

## 2017-12-27 DIAGNOSIS — J02.9 SORE THROAT: ICD-10-CM

## 2017-12-27 DIAGNOSIS — J11.1 INFLUENZA: Primary | ICD-10-CM

## 2017-12-27 LAB
CTP QC/QA: YES
S PYO RRNA THROAT QL PROBE: NEGATIVE

## 2017-12-27 PROCEDURE — 99214 OFFICE O/P EST MOD 30 MIN: CPT | Mod: S$GLB,,, | Performed by: PHYSICIAN ASSISTANT

## 2017-12-27 PROCEDURE — 87880 STREP A ASSAY W/OPTIC: CPT | Mod: QW,S$GLB,, | Performed by: PHYSICIAN ASSISTANT

## 2017-12-27 RX ORDER — BENZONATATE 100 MG/1
100 CAPSULE ORAL 3 TIMES DAILY PRN
Qty: 30 CAPSULE | Refills: 0 | Status: SHIPPED | OUTPATIENT
Start: 2017-12-27 | End: 2018-04-13

## 2017-12-27 NOTE — PATIENT INSTRUCTIONS
-Tylenol every 4 hours OR ibuprofen every 6 hours as needed for pain/fever.  -Rest and stay hydrated.  -Flonase OTC or Nasacort OTC for nasal congestion.  -Warm face compresses to help with facial sinus pain/pressure.  -Simple foods like chicken noodle soup.  -Delsym helps with coughing at night  -Zyrtec/Claritin during the day & Benadryl at night may help with allergies.    Please follow up with your primary care provider within 2-5 days if your signs and symptoms have not resolved or worsen.     If your condition worsens or fails to improve we recommend that you receive another evaluation at the emergency room immediately or contact your primary medical clinic to discuss your concerns.   You must understand that you have received an Urgent Care treatment only and that you may be released before all of your medical problems are known or treated. You, the patient, will arrange for follow up care as instructed.             The Flu (Influenza)     The virus that causes the flu spreads through the air in droplets when someone who has the flu coughs, sneezes, laughs, or talks.   The flu (influenza) is an infection that affects your respiratory tract. This tract is made up of your mouth, nose, and lungs, and the passages between them. Unlike a cold, the flu can make you very ill. And it can lead to pneumonia, a serious lung infection. The flu can have serious complications and even cause death.  Who is at risk for the flu?  Anyone can get the flu. But you are more likely to become infected if you:  · Have a weakened immune system  · Work in a healthcare setting where you may be exposed to flu germs  · Live or work with someone who has the flu  · Havent had an annual flu shot  How does the flu spread?  The flu is caused by a virus. The virus spreads through the air in droplets when someone who has the flu coughs, sneezes, laughs, or talks. You can become infected when you inhale these viruses directly. You can also  become infected when you touch a surface on which the droplets have landed and then transfer the germs to your eyes, nose, or mouth. Touching used tissues, or sharing utensils, drinking glasses, or a toothbrush from an infected person can expose you to flu viruses, too.  What are the symptoms of the flu?  Flu symptoms tend to come on quickly and may last a few days to a few weeks. They include:  · Fever usually higher than 100.4°F  (38°C) and chills  · Sore throat and headache  · Dry cough  · Runny nose  · Tiredness and weakness  · Muscle aches  Who is at risk for flu complications?  For some people, the flu can be very serious. The risk for complications is greater for:  · Children younger than age 5  · Adults ages 65 and older  · People with a chronic illness such as diabetes or heart, kidney, or lung disease  · People who live in a nursing home or long-term care facility   How is the flu treated?  The flu usually gets better after 7 days or so. In some cases, your healthcare provider may prescribe an antiviral medicine. This may help you get well a little sooner. For the medicine to help, you need to take it as soon as possible (ideally within 48 hours) after your symptoms start. If you develop pneumonia or other serious illness, you may need to stay in the hospital.  Easing flu symptoms  · Drink lots of fluids such as water, juice, and warm soup. A good rule is to drink enough so that you urinate your normal amount.  · Get plenty of rest.  · Ask your healthcare provider what to take for fever and pain.  · Call your provider if your fever is 100.4°F (38°C) or higher, or you become dizzy, lightheaded, or short of breath.  Taking steps to protect others  · Wash your hands often, especially after coughing or sneezing. Or clean your hands with an alcohol-based hand  containing at least 60% alcohol.  · Cough or sneeze into a tissue. Then throw the tissue away and wash your hands. If you dont have a tissue,  cough and sneeze into your elbow.  · Stay home until at least 24 hours after you no longer have a fever or chills. Be sure the fever isnt being hidden by fever-reducing medicine.  · Dont share food, utensils, drinking glasses, or a toothbrush with others.  · Ask your healthcare provider if others in your household should get antiviral medicine to help them avoid infection.  How can the flu be prevented?  · One of the best ways to avoid the flu is to get a flu vaccine each year. The virus that causes the flu changes from year to year. For that reason, healthcare providers recommend getting the flu vaccine each year, as soon as it's available in your area. The vaccine is given as a shot. Your healthcare provider can tell you which vaccine is right for you. A nasal spray is also available but is not recommended for the 8413-1941 flu season. The CDC says this is because the nasal spray did not seem to protect against the flu over the last several flu seasons. In the past, it was meant for people ages 2 to 49.  · Wash your hands often. Frequent handwashing is a proven way to help prevent infection.  · Carry an alcohol-based hand gel containing at least 60% alcohol. Use it when you can't use soap and water. Then wash your hands as soon as you can.  · Avoid touching your eyes, nose, and mouth.  · At home and work, clean phones, computer keyboards, and toys often with disinfectant wipes.  · If possible, avoid close contact with others who have the flu or symptoms of the flu.  Handwashing tips  Handwashing is one of the best ways to prevent many common infections. If you are caring for or visiting someone with the flu, wash your hands each time you enter and leave the room. Follow these steps:  · Use warm water and plenty of soap. Rub your hands together well.  · Clean the whole hand, including under your nails, between your fingers, and up the wrists.  · Wash for at least 15 seconds.  · Rinse, letting the water run down  your fingers, not up your wrists.  · Dry your hands well. Use a paper towel to turn off the faucet and open the door.  Using alcohol-based hand   Alcohol-based hand  are also a good choice. Use them when you can't use soap and water. Follow these steps:  · Squeeze about a tablespoon of gel into the palm of one hand.  · Rub your hands together briskly, cleaning the backs of your hands, the palms, between your fingers, and up the wrists.  · Rub until the gel is gone and your hands are completely dry.  Preventing the flu in healthcare settings  The flu is a special concern for people in hospitals and long-term care facilities. To help prevent the spread of flu, many hospitals and nursing homes take these steps:  · Healthcare providers wash their hands or use an alcohol-based hand  before and after treating each patient.  · People with the flu have private rooms and bathrooms or share a room with someone with the same infection.  · People who are at high risk for the flu but don't have it are encouraged to get the flu and pneumonia vaccines.  · All healthcare workers are encouraged or required to get flu shots.   Date Last Reviewed: 12/1/2016  © 6282-9271 [employer] [address] [place] [address], [place] [address]. All rights reserved. This information is not intended as a substitute for professional medical care. Always follow your healthcare professional's instructions.

## 2017-12-27 NOTE — PROGRESS NOTES
Subjective:       Patient ID: Brianna Coughlin is a 52 y.o. female.    Chief Complaint: Fever    Vitals:    12/27/17 0811   BP: (!) 147/87   Pulse: 98   Resp: 18   Temp: (!) 102.7 °F (39.3 °C)       Pt presents with fever and body aches. She states she has a little congestion and sore throat. Her daughter recently had the same symptoms but tested negative for the flu.      Fever    This is a new problem. The current episode started yesterday. The problem occurs constantly. The problem has been gradually worsening. The maximum temperature noted was 102 to 102.9 F. The temperature was taken using an oral thermometer. Associated symptoms include congestion (mild), coughing, headaches, muscle aches and a sore throat. Pertinent negatives include no abdominal pain, chest pain, diarrhea, ear pain, nausea, rash, urinary pain, vomiting or wheezing. She has tried acetaminophen (sudafed) for the symptoms. The treatment provided mild relief.     Review of Systems   Constitution: Positive for chills, fever and malaise/fatigue.   HENT: Positive for congestion (mild) and sore throat. Negative for ear pain and hoarse voice.    Eyes: Negative for discharge and redness.   Cardiovascular: Negative for chest pain, dyspnea on exertion and leg swelling.   Respiratory: Positive for cough. Negative for shortness of breath, sputum production and wheezing.    Skin: Negative for rash.   Musculoskeletal: Negative for myalgias.   Gastrointestinal: Negative for abdominal pain, diarrhea, nausea and vomiting.   Genitourinary: Negative for dysuria.   Neurological: Positive for headaches.       Objective:      Physical Exam   Constitutional: She is oriented to person, place, and time. She appears well-developed and well-nourished. She appears ill.   HENT:   Head: Normocephalic and atraumatic.   Right Ear: Hearing, tympanic membrane, external ear and ear canal normal.   Left Ear: Hearing, tympanic membrane, external ear and ear canal normal.    Nose: Mucosal edema present. Right sinus exhibits no maxillary sinus tenderness and no frontal sinus tenderness. Left sinus exhibits no maxillary sinus tenderness and no frontal sinus tenderness.   Mouth/Throat: Uvula is midline. Oropharyngeal exudate and posterior oropharyngeal erythema present. No posterior oropharyngeal edema.   Eyes: Conjunctivae, EOM and lids are normal. Right eye exhibits no discharge. Left eye exhibits no discharge.   Neck: Normal range of motion. Neck supple.   Cardiovascular: Normal rate, regular rhythm and normal heart sounds.  Exam reveals no gallop and no friction rub.    No murmur heard.  Pulmonary/Chest: Effort normal and breath sounds normal. No respiratory distress. She has no decreased breath sounds. She has no wheezes. She has no rhonchi. She has no rales.   Musculoskeletal: Normal range of motion.   Lymphadenopathy:        Head (right side): No submandibular and no tonsillar adenopathy present.        Head (left side): No submandibular and no tonsillar adenopathy present.   Neurological: She is alert and oriented to person, place, and time.   Skin: Skin is warm and dry. No rash noted. No erythema.   Psychiatric: She has a normal mood and affect. Her behavior is normal.   Nursing note and vitals reviewed.      Assessment:       1. Influenza    2. Sore throat        Plan:     No flu tests at clinic. Offered pt tamiflu and discussed risks and benefits. Discussed treatment options with patient. Patient understood and verbally agreed with treatment plan.      Medications Ordered This Encounter      benzonatate (TESSALON PERLES) 100 MG capsule          Sig: Take 1 capsule (100 mg total) by mouth 3 (three) times daily as needed for Cough.          Dispense:  30 capsule          Refill:  0         Patient Instructions     -Tylenol every 4 hours OR ibuprofen every 6 hours as needed for pain/fever.  -Rest and stay hydrated.  -Flonase OTC or Nasacort OTC for nasal congestion.  -Warm face  compresses to help with facial sinus pain/pressure.  -Simple foods like chicken noodle soup.  -Delsym helps with coughing at night  -Zyrtec/Claritin during the day & Benadryl at night may help with allergies.    Please follow up with your primary care provider within 2-5 days if your signs and symptoms have not resolved or worsen.     If your condition worsens or fails to improve we recommend that you receive another evaluation at the emergency room immediately or contact your primary medical clinic to discuss your concerns.   You must understand that you have received an Urgent Care treatment only and that you may be released before all of your medical problems are known or treated. You, the patient, will arrange for follow up care as instructed.             The Flu (Influenza)     The virus that causes the flu spreads through the air in droplets when someone who has the flu coughs, sneezes, laughs, or talks.   The flu (influenza) is an infection that affects your respiratory tract. This tract is made up of your mouth, nose, and lungs, and the passages between them. Unlike a cold, the flu can make you very ill. And it can lead to pneumonia, a serious lung infection. The flu can have serious complications and even cause death.  Who is at risk for the flu?  Anyone can get the flu. But you are more likely to become infected if you:  · Have a weakened immune system  · Work in a healthcare setting where you may be exposed to flu germs  · Live or work with someone who has the flu  · Havent had an annual flu shot  How does the flu spread?  The flu is caused by a virus. The virus spreads through the air in droplets when someone who has the flu coughs, sneezes, laughs, or talks. You can become infected when you inhale these viruses directly. You can also become infected when you touch a surface on which the droplets have landed and then transfer the germs to your eyes, nose, or mouth. Touching used tissues, or sharing  utensils, drinking glasses, or a toothbrush from an infected person can expose you to flu viruses, too.  What are the symptoms of the flu?  Flu symptoms tend to come on quickly and may last a few days to a few weeks. They include:  · Fever usually higher than 100.4°F  (38°C) and chills  · Sore throat and headache  · Dry cough  · Runny nose  · Tiredness and weakness  · Muscle aches  Who is at risk for flu complications?  For some people, the flu can be very serious. The risk for complications is greater for:  · Children younger than age 5  · Adults ages 65 and older  · People with a chronic illness such as diabetes or heart, kidney, or lung disease  · People who live in a nursing home or long-term care facility   How is the flu treated?  The flu usually gets better after 7 days or so. In some cases, your healthcare provider may prescribe an antiviral medicine. This may help you get well a little sooner. For the medicine to help, you need to take it as soon as possible (ideally within 48 hours) after your symptoms start. If you develop pneumonia or other serious illness, you may need to stay in the hospital.  Easing flu symptoms  · Drink lots of fluids such as water, juice, and warm soup. A good rule is to drink enough so that you urinate your normal amount.  · Get plenty of rest.  · Ask your healthcare provider what to take for fever and pain.  · Call your provider if your fever is 100.4°F (38°C) or higher, or you become dizzy, lightheaded, or short of breath.  Taking steps to protect others  · Wash your hands often, especially after coughing or sneezing. Or clean your hands with an alcohol-based hand  containing at least 60% alcohol.  · Cough or sneeze into a tissue. Then throw the tissue away and wash your hands. If you dont have a tissue, cough and sneeze into your elbow.  · Stay home until at least 24 hours after you no longer have a fever or chills. Be sure the fever isnt being hidden by fever-reducing  medicine.  · Dont share food, utensils, drinking glasses, or a toothbrush with others.  · Ask your healthcare provider if others in your household should get antiviral medicine to help them avoid infection.  How can the flu be prevented?  · One of the best ways to avoid the flu is to get a flu vaccine each year. The virus that causes the flu changes from year to year. For that reason, healthcare providers recommend getting the flu vaccine each year, as soon as it's available in your area. The vaccine is given as a shot. Your healthcare provider can tell you which vaccine is right for you. A nasal spray is also available but is not recommended for the 3058-7545 flu season. The CDC says this is because the nasal spray did not seem to protect against the flu over the last several flu seasons. In the past, it was meant for people ages 2 to 49.  · Wash your hands often. Frequent handwashing is a proven way to help prevent infection.  · Carry an alcohol-based hand gel containing at least 60% alcohol. Use it when you can't use soap and water. Then wash your hands as soon as you can.  · Avoid touching your eyes, nose, and mouth.  · At home and work, clean phones, computer keyboards, and toys often with disinfectant wipes.  · If possible, avoid close contact with others who have the flu or symptoms of the flu.  Handwashing tips  Handwashing is one of the best ways to prevent many common infections. If you are caring for or visiting someone with the flu, wash your hands each time you enter and leave the room. Follow these steps:  · Use warm water and plenty of soap. Rub your hands together well.  · Clean the whole hand, including under your nails, between your fingers, and up the wrists.  · Wash for at least 15 seconds.  · Rinse, letting the water run down your fingers, not up your wrists.  · Dry your hands well. Use a paper towel to turn off the faucet and open the door.  Using alcohol-based hand   Alcohol-based  hand  are also a good choice. Use them when you can't use soap and water. Follow these steps:  · Squeeze about a tablespoon of gel into the palm of one hand.  · Rub your hands together briskly, cleaning the backs of your hands, the palms, between your fingers, and up the wrists.  · Rub until the gel is gone and your hands are completely dry.  Preventing the flu in healthcare settings  The flu is a special concern for people in hospitals and long-term care facilities. To help prevent the spread of flu, many hospitals and nursing homes take these steps:  · Healthcare providers wash their hands or use an alcohol-based hand  before and after treating each patient.  · People with the flu have private rooms and bathrooms or share a room with someone with the same infection.  · People who are at high risk for the flu but don't have it are encouraged to get the flu and pneumonia vaccines.  · All healthcare workers are encouraged or required to get flu shots.   Date Last Reviewed: 12/1/2016  © 4864-2747 [employer]. [address] [place] [address], [place] [address]. All rights reserved. This information is not intended as a substitute for professional medical care. Always follow your healthcare professional's instructions.

## 2017-12-27 NOTE — LETTER
December 27, 2017      [facility] [address], [place] [address]  Phone: [phone]  Fax: [phone]       Patient: Brianna Coughlin   YOB: 1965  Date of Visit: 12/27/2017    To Whom It May Concern:    Santos Coughlin  was at [facility] on 12/27/2017. She may return to work/school on 1/1/2018 with no restrictions. If you have any questions or concerns, or if I can be of further assistance, please do not hesitate to contact me.    Sincerely,    Luci Page PA-C

## 2017-12-28 ENCOUNTER — TELEPHONE (OUTPATIENT)
Dept: FAMILY MEDICINE | Facility: CLINIC | Age: 52
End: 2017-12-28

## 2017-12-28 DIAGNOSIS — Z12.11 COLON CANCER SCREENING: ICD-10-CM

## 2017-12-28 NOTE — TELEPHONE ENCOUNTER
Spoke to patient advised would need to be seen. She said she was seen in urgent care outside source. I looked in chart they did a throat swab it was negative. This is the reason antibiotic was not given.

## 2017-12-28 NOTE — TELEPHONE ENCOUNTER
Dr. Mcclendon is not in the office.   I am covering for her and she would need an evaluation prior to antibiotics being prescribed.

## 2017-12-28 NOTE — TELEPHONE ENCOUNTER
"----- Message from Any Varma sent at 12/28/2017  8:25 AM CST -----  Contact: self  Patient was seen  at the Urgent care and was diagnosis with the Flu. Patient said " its not the flu but she believes she has Strep throat. Patient would like doctor to call in a z-pack.    Patient can be reached at [phone].  "

## 2018-01-12 DIAGNOSIS — M54.16 RIGHT LUMBAR RADICULOPATHY: ICD-10-CM

## 2018-01-13 RX ORDER — GABAPENTIN 600 MG/1
TABLET ORAL
Qty: 120 TABLET | Refills: 1 | Status: SHIPPED | OUTPATIENT
Start: 2018-01-13 | End: 2018-03-06 | Stop reason: SDUPTHER

## 2018-01-19 ENCOUNTER — TELEPHONE (OUTPATIENT)
Dept: FAMILY MEDICINE | Facility: CLINIC | Age: 53
End: 2018-01-19

## 2018-01-19 DIAGNOSIS — Z12.31 ENCOUNTER FOR SCREENING MAMMOGRAM FOR BREAST CANCER: Primary | ICD-10-CM

## 2018-01-19 NOTE — TELEPHONE ENCOUNTER
----- Message from Jennifer Larsen sent at 1/19/2018  2:01 PM CST -----  Contact: Self  Pt requesting an order for mammo gram. [phone].

## 2018-01-24 ENCOUNTER — HOSPITAL ENCOUNTER (OUTPATIENT)
Dept: RADIOLOGY | Facility: HOSPITAL | Age: 53
Discharge: HOME | End: 2018-01-24
Attending: FAMILY MEDICINE
Payer: COMMERCIAL

## 2018-01-24 DIAGNOSIS — Z12.31 ENCOUNTER FOR SCREENING MAMMOGRAM FOR BREAST CANCER: ICD-10-CM

## 2018-01-24 PROCEDURE — 77067 SCR MAMMO BI INCL CAD: CPT | Mod: TC,PO

## 2018-01-24 PROCEDURE — 77063 BREAST TOMOSYNTHESIS BI: CPT | Mod: 26,,, | Performed by: RADIOLOGY

## 2018-01-24 PROCEDURE — 77067 SCR MAMMO BI INCL CAD: CPT | Mod: 26,,, | Performed by: RADIOLOGY

## 2018-02-01 DIAGNOSIS — G43.909 MIGRAINE SYNDROME: ICD-10-CM

## 2018-02-01 DIAGNOSIS — K21.9 GASTROESOPHAGEAL REFLUX DISEASE, ESOPHAGITIS PRESENCE NOT SPECIFIED: ICD-10-CM

## 2018-02-01 DIAGNOSIS — I10 ESSENTIAL HYPERTENSION: ICD-10-CM

## 2018-02-02 RX ORDER — NADOLOL 80 MG/1
80 TABLET ORAL DAILY
Qty: 30 TABLET | Refills: 0 | Status: SHIPPED | OUTPATIENT
Start: 2018-02-02 | End: 2018-02-17 | Stop reason: SDUPTHER

## 2018-02-02 RX ORDER — OMEPRAZOLE 40 MG/1
40 CAPSULE, DELAYED RELEASE ORAL DAILY
Qty: 30 CAPSULE | Refills: 2 | Status: SHIPPED | OUTPATIENT
Start: 2018-02-02 | End: 2018-04-13 | Stop reason: SDUPTHER

## 2018-02-09 ENCOUNTER — TELEPHONE (OUTPATIENT)
Dept: FAMILY MEDICINE | Facility: CLINIC | Age: 53
End: 2018-02-09

## 2018-02-09 NOTE — TELEPHONE ENCOUNTER
----- Message from Any Varma sent at 2/7/2018  1:40 PM CST -----  Contact: self  Patient has question about the colon guard test. Patient can be reached at [phone].      Thanks,

## 2018-02-17 DIAGNOSIS — I10 ESSENTIAL HYPERTENSION: ICD-10-CM

## 2018-02-17 DIAGNOSIS — G43.909 MIGRAINE SYNDROME: ICD-10-CM

## 2018-02-19 RX ORDER — NADOLOL 80 MG/1
80 TABLET ORAL DAILY
Qty: 30 TABLET | Refills: 0 | Status: SHIPPED | OUTPATIENT
Start: 2018-02-19 | End: 2018-03-23 | Stop reason: SDUPTHER

## 2018-03-06 DIAGNOSIS — M54.16 RIGHT LUMBAR RADICULOPATHY: ICD-10-CM

## 2018-03-06 RX ORDER — GABAPENTIN 600 MG/1
TABLET ORAL
Qty: 120 TABLET | Refills: 1 | Status: SHIPPED | OUTPATIENT
Start: 2018-03-06 | End: 2018-05-15 | Stop reason: SDUPTHER

## 2018-03-23 DIAGNOSIS — G43.909 MIGRAINE SYNDROME: ICD-10-CM

## 2018-03-23 DIAGNOSIS — I10 ESSENTIAL HYPERTENSION: ICD-10-CM

## 2018-03-23 RX ORDER — NADOLOL 80 MG/1
80 TABLET ORAL DAILY
Qty: 30 TABLET | Refills: 0 | Status: SHIPPED | OUTPATIENT
Start: 2018-03-23 | End: 2018-04-13 | Stop reason: SDUPTHER

## 2018-04-13 ENCOUNTER — OFFICE VISIT (OUTPATIENT)
Dept: FAMILY MEDICINE | Facility: CLINIC | Age: 53
End: 2018-04-13
Payer: COMMERCIAL

## 2018-04-13 ENCOUNTER — LAB VISIT (OUTPATIENT)
Dept: LAB | Facility: HOSPITAL | Age: 53
End: 2018-04-13
Attending: FAMILY MEDICINE
Payer: COMMERCIAL

## 2018-04-13 VITALS
TEMPERATURE: 98 F | HEART RATE: 60 BPM | SYSTOLIC BLOOD PRESSURE: 120 MMHG | BODY MASS INDEX: 31.24 KG/M2 | OXYGEN SATURATION: 97 % | WEIGHT: 183 LBS | DIASTOLIC BLOOD PRESSURE: 80 MMHG | HEIGHT: 64 IN

## 2018-04-13 DIAGNOSIS — I10 ESSENTIAL HYPERTENSION: Primary | ICD-10-CM

## 2018-04-13 DIAGNOSIS — I10 ESSENTIAL HYPERTENSION: ICD-10-CM

## 2018-04-13 DIAGNOSIS — M54.41 CHRONIC MIDLINE LOW BACK PAIN WITH RIGHT-SIDED SCIATICA: ICD-10-CM

## 2018-04-13 DIAGNOSIS — G89.29 CHRONIC MIDLINE LOW BACK PAIN WITH RIGHT-SIDED SCIATICA: ICD-10-CM

## 2018-04-13 DIAGNOSIS — E66.09 CLASS 1 OBESITY DUE TO EXCESS CALORIES WITH SERIOUS COMORBIDITY AND BODY MASS INDEX (BMI) OF 31.0 TO 31.9 IN ADULT: ICD-10-CM

## 2018-04-13 DIAGNOSIS — M54.16 RIGHT LUMBAR RADICULOPATHY: ICD-10-CM

## 2018-04-13 DIAGNOSIS — M51.26 PROTRUDED LUMBAR DISC: ICD-10-CM

## 2018-04-13 DIAGNOSIS — K21.9 GASTROESOPHAGEAL REFLUX DISEASE, ESOPHAGITIS PRESENCE NOT SPECIFIED: ICD-10-CM

## 2018-04-13 DIAGNOSIS — G43.909 MIGRAINE SYNDROME: ICD-10-CM

## 2018-04-13 LAB
ALBUMIN SERPL BCP-MCNC: 3.7 G/DL
ALP SERPL-CCNC: 54 U/L
ALT SERPL W/O P-5'-P-CCNC: 15 U/L
ANION GAP SERPL CALC-SCNC: 7 MMOL/L
AST SERPL-CCNC: 14 U/L
BASOPHILS # BLD AUTO: 0.03 K/UL
BASOPHILS NFR BLD: 0.3 %
BILIRUB SERPL-MCNC: 0.6 MG/DL
BUN SERPL-MCNC: 14 MG/DL
CALCIUM SERPL-MCNC: 9.3 MG/DL
CHLORIDE SERPL-SCNC: 104 MMOL/L
CHOLEST SERPL-MCNC: 256 MG/DL
CHOLEST/HDLC SERPL: 3.8 {RATIO}
CO2 SERPL-SCNC: 27 MMOL/L
CREAT SERPL-MCNC: 0.9 MG/DL
DIFFERENTIAL METHOD: ABNORMAL
EOSINOPHIL # BLD AUTO: 0.2 K/UL
EOSINOPHIL NFR BLD: 2.2 %
ERYTHROCYTE [DISTWIDTH] IN BLOOD BY AUTOMATED COUNT: 13.9 %
EST. GFR  (AFRICAN AMERICAN): >60 ML/MIN/1.73 M^2
EST. GFR  (NON AFRICAN AMERICAN): >60 ML/MIN/1.73 M^2
ESTIMATED AVG GLUCOSE: 103 MG/DL
GLUCOSE SERPL-MCNC: 109 MG/DL
HBA1C MFR BLD HPLC: 5.2 %
HCT VFR BLD AUTO: 42.4 %
HDLC SERPL-MCNC: 68 MG/DL
HDLC SERPL: 26.6 %
HGB BLD-MCNC: 13.5 G/DL
IMM GRANULOCYTES # BLD AUTO: 0.04 K/UL
IMM GRANULOCYTES NFR BLD AUTO: 0.4 %
LDLC SERPL CALC-MCNC: 163.4 MG/DL
LYMPHOCYTES # BLD AUTO: 2.3 K/UL
LYMPHOCYTES NFR BLD: 25.2 %
MCH RBC QN AUTO: 29.5 PG
MCHC RBC AUTO-ENTMCNC: 31.8 G/DL
MCV RBC AUTO: 93 FL
MONOCYTES # BLD AUTO: 1 K/UL
MONOCYTES NFR BLD: 10.8 %
NEUTROPHILS # BLD AUTO: 5.6 K/UL
NEUTROPHILS NFR BLD: 61.1 %
NONHDLC SERPL-MCNC: 188 MG/DL
NRBC BLD-RTO: 0 /100 WBC
PLATELET # BLD AUTO: 344 K/UL
PMV BLD AUTO: 10.2 FL
POTASSIUM SERPL-SCNC: 4.9 MMOL/L
PROT SERPL-MCNC: 7.2 G/DL
RBC # BLD AUTO: 4.57 M/UL
SODIUM SERPL-SCNC: 138 MMOL/L
TRIGL SERPL-MCNC: 123 MG/DL
TSH SERPL DL<=0.005 MIU/L-ACNC: 1.58 UIU/ML
WBC # BLD AUTO: 9.1 K/UL

## 2018-04-13 PROCEDURE — 80053 COMPREHEN METABOLIC PANEL: CPT

## 2018-04-13 PROCEDURE — 84443 ASSAY THYROID STIM HORMONE: CPT

## 2018-04-13 PROCEDURE — 80061 LIPID PANEL: CPT

## 2018-04-13 PROCEDURE — 36415 COLL VENOUS BLD VENIPUNCTURE: CPT | Mod: PO

## 2018-04-13 PROCEDURE — 99999 PR PBB SHADOW E&M-EST. PATIENT-LVL III: CPT | Mod: PBBFAC,,, | Performed by: FAMILY MEDICINE

## 2018-04-13 PROCEDURE — 3079F DIAST BP 80-89 MM HG: CPT | Mod: CPTII,S$GLB,, | Performed by: FAMILY MEDICINE

## 2018-04-13 PROCEDURE — 99214 OFFICE O/P EST MOD 30 MIN: CPT | Mod: S$GLB,,, | Performed by: FAMILY MEDICINE

## 2018-04-13 PROCEDURE — 3074F SYST BP LT 130 MM HG: CPT | Mod: CPTII,S$GLB,, | Performed by: FAMILY MEDICINE

## 2018-04-13 PROCEDURE — 85025 COMPLETE CBC W/AUTO DIFF WBC: CPT

## 2018-04-13 PROCEDURE — 83036 HEMOGLOBIN GLYCOSYLATED A1C: CPT

## 2018-04-13 RX ORDER — OMEPRAZOLE 40 MG/1
40 CAPSULE, DELAYED RELEASE ORAL DAILY
Qty: 90 CAPSULE | Refills: 2 | Status: SHIPPED | OUTPATIENT
Start: 2018-04-13 | End: 2019-02-18 | Stop reason: SDUPTHER

## 2018-04-13 RX ORDER — NADOLOL 80 MG/1
80 TABLET ORAL DAILY
Qty: 90 TABLET | Refills: 3 | Status: SHIPPED | OUTPATIENT
Start: 2018-04-13 | End: 2019-01-07

## 2018-04-13 RX ORDER — UBIDECARENONE 30 MG
30 CAPSULE ORAL 3 TIMES DAILY
Status: ON HOLD | COMMUNITY
End: 2020-08-06 | Stop reason: HOSPADM

## 2018-04-13 RX ORDER — MULTIVITAMIN
1 TABLET ORAL DAILY
COMMUNITY

## 2018-04-13 NOTE — PROGRESS NOTES
Office Visit    Patient Name: Brianna Coughlin    : 1965  MRN: [id number]    Subjective:  Brianna is a 52 y.o. female who presents today for:    Weight Loss and Medication Refill      This patient has multiple medical diagnoses as noted below.  This patient is known to me and to this clinic. She primarily requests refills on her medication.    She reports that she is not as active due to her sciatic pain   She has noted weight gain over the last 3 years.   Diet:  Breakfast: 2 pieces of toast with raisin and yogurt   Two tootsie rolls   Lunch:  School lunch:  Red beans sausage, darrion salad   Dinner:  Two pieces of toast with a yogurt.      She reports increased pain with walking for 30 minutes.  Sciatica will limit her mobility.    She has been on diet pills on the past.  She does not want a stimulant medication.    Patient denies any new symptoms including chest pain, SOB, blurry vision, N/V, diarrhea.    She would like to go to PT to address her back pain (sciatica)          Patient Active Problem List   Diagnosis    Undiagnosed cardiac murmurs    Lumbago    Anxiety    GERD (gastroesophageal reflux disease)    Personal history of cervical dysplasia    Fibroids    Dermatophytosis of nail    Protruded lumbar disc (at Left L4-5)    Right lumbar radiculopathy    Chronic low back pain    Fibromyalgia    Essential hypertension    Migraine syndrome    FHx: coronary artery disease    H/O: duodenal ulcer    Ang's neuroma of left foot 2nd space       Past Surgical History:   Procedure Laterality Date    CHOLECYSTECTOMY      conization of cervix      TUBAL LIGATION         Family History   Problem Relation Age of Onset    Hypertension Mother     Heart disease Mother     Heart disease Father     Stroke Maternal Grandfather        Social History     Social History    Marital status: Single     Spouse name: N/A    Number of children: N/A    Years of education: N/A     Occupational History     Not on file.     Social History Main Topics    Smoking status: Never Smoker    Smokeless tobacco: Never Used    Alcohol use 0.0 oz/week      Comment: socially    Drug use: No    Sexual activity: Not Currently     Partners: Male     Other Topics Concern    Not on file     Social History Narrative    No narrative on file       Current Medications  Medications reviewed and updated.     Allergies   Review of patient's allergies indicates:   Allergen Reactions    Acetaminophen-codeine Other (See Comments)     Stomach pains    Mobic [meloxicam] Other (See Comments)     STOMACH ULCERS    Iodine Other (See Comments)    Penicillins Nausea And Vomiting    Sulfa (sulfonamide antibiotics) Nausea And Vomiting     Other reaction(s): Rash    Unable to assess      MEDICINES CONTAINING ASPIRIN AFFECT HER STOMACH/STOMACH ULCER         Labs  No results found for: LABA1C, HGBA1C  Lab Results   Component Value Date     11/20/2015    K 4.5 11/20/2015     11/20/2015    CO2 26 11/20/2015    BUN 19 11/20/2015    CREATININE 0.8 11/20/2015    CALCIUM 9.4 11/20/2015    ANIONGAP 8 11/20/2015    ESTGFRAFRICA >60.0 11/20/2015    EGFRNONAA >60.0 11/20/2015     Lab Results   Component Value Date    CHOL 221 (H) 02/23/2016    CHOL 223 (H) 07/18/2014    CHOL 213 (H) 11/26/2013     Lab Results   Component Value Date    HDL 62 02/23/2016    HDL 67 07/18/2014    HDL 67 11/26/2013     Lab Results   Component Value Date    LDLCALC 130.6 02/23/2016    LDLCALC 145.0 07/18/2014    LDLCALC 130.0 11/26/2013     Lab Results   Component Value Date    TRIG 142 02/23/2016    TRIG 55 07/18/2014    TRIG 80 11/26/2013     Lab Results   Component Value Date    CHOLHDL 28.1 02/23/2016    CHOLHDL 30.0 07/18/2014    CHOLHDL 31.5 11/26/2013     Last set of blood work has been reviewed as noted above.    Review of Systems   Constitutional: Negative for activity change, appetite change, fatigue, fever and unexpected weight change.   HENT:  "Negative.  Negative for ear discharge, ear pain, rhinorrhea and sore throat.    Eyes: Negative.    Respiratory: Negative for apnea, cough, chest tightness, shortness of breath and wheezing.    Cardiovascular: Negative for chest pain, palpitations and leg swelling.   Gastrointestinal: Negative for abdominal distention, abdominal pain, constipation, diarrhea and vomiting.   Endocrine: Negative for cold intolerance, heat intolerance, polydipsia and polyuria.   Genitourinary: Negative for decreased urine volume, menstrual problem, urgency, vaginal bleeding, vaginal discharge and vaginal pain.   Musculoskeletal: Negative.    Skin: Negative for rash.   Neurological: Negative for dizziness and headaches.   Hematological: Does not bruise/bleed easily.   Psychiatric/Behavioral: Negative for agitation, sleep disturbance and suicidal ideas.       /80 (BP Location: Left arm, Patient Position: Sitting, BP Method: Large (Manual))   Pulse 60   Temp 98.4 °F (36.9 °C) (Oral)   Ht 5' 4" (1.626 m)   Wt 83 kg (182 lb 15.7 oz)   SpO2 97%   BMI 31.41 kg/m²      Physical Exam   Constitutional: She is oriented to person, place, and time. She appears well-developed and well-nourished.   HENT:   Head: Normocephalic.   Right Ear: External ear normal.   Left Ear: External ear normal.   Nose: Nose normal.   Mouth/Throat: Oropharynx is clear and moist.   Eyes: Conjunctivae and EOM are normal. Pupils are equal, round, and reactive to light.   Cardiovascular: Normal rate, regular rhythm and normal heart sounds.    Pulmonary/Chest: Effort normal and breath sounds normal.   Neurological: She is alert and oriented to person, place, and time.   Skin: Skin is warm and dry.   Vitals reviewed.      Health Maintenance  Health Maintenance       Date Due Completion Date    Hepatitis C Screening 1965 ---    TETANUS VACCINE 05/06/1983 ---    Colonoscopy 05/06/2015 ---    Influenza Vaccine 08/01/2017 ---    Mammogram 01/24/2020 1/24/2018    " Pap Smear with HPV Cotest 03/03/2020 3/3/2017    Lipid Panel 02/23/2021 2/23/2016          Assessment/Plan:  Brianna Coughlin is a 52 y.o. female who presents today for :    1. Essential hypertension    2. Gastroesophageal reflux disease, esophagitis presence not specified    3. Migraine syndrome    4. Class 1 obesity due to excess calories with serious comorbidity and body mass index (BMI) of 31.0 to 31.9 in adult    5. Chronic midline low back pain with right-sided sciatica    6. Right lumbar radiculopathy    7. Protruded lumbar disc (at Left L4-5)        Problem List Items Addressed This Visit        Unprioritized    Chronic low back pain    Relevant Orders    Ambulatory Referral to Physical/Occupational Therapy    Essential hypertension - Primary    Relevant Medications    nadolol (CORGARD) 80 MG tablet    Other Relevant Orders    CBC auto differential    Comprehensive metabolic panel    Hemoglobin A1c    Lipid panel    TSH    GERD (gastroesophageal reflux disease)    Relevant Medications    omeprazole (PRILOSEC) 40 MG capsule    Migraine syndrome    Relevant Medications    nadolol (CORGARD) 80 MG tablet    Protruded lumbar disc (at Left L4-5)    Relevant Orders    Ambulatory Referral to Physical/Occupational Therapy    Right lumbar radiculopathy    Relevant Orders    Ambulatory Referral to Physical/Occupational Therapy      Other Visit Diagnoses     Class 1 obesity due to excess calories with serious comorbidity and body mass index (BMI) of 31.0 to 31.9 in adult        Relevant Medications    naltrexone-bupropion 8-90 mg TbSR          Follow-up in about 4 weeks (around 5/11/2018).     This note was created by combination of typed  and Dragon dictation.  Transcription errors may be present.  If there are any questions, please contact me.

## 2018-04-25 ENCOUNTER — CLINICAL SUPPORT (OUTPATIENT)
Dept: REHABILITATION | Facility: HOSPITAL | Age: 53
End: 2018-04-25
Attending: FAMILY MEDICINE
Payer: COMMERCIAL

## 2018-04-25 DIAGNOSIS — R26.2 DIFFICULTY WALKING: ICD-10-CM

## 2018-04-25 DIAGNOSIS — R29.898 WEAKNESS OF RIGHT LOWER EXTREMITY: ICD-10-CM

## 2018-04-25 DIAGNOSIS — M53.86 DECREASED ROM OF LUMBAR SPINE: ICD-10-CM

## 2018-04-25 PROCEDURE — 97161 PT EVAL LOW COMPLEX 20 MIN: CPT | Mod: PN

## 2018-04-25 PROCEDURE — 97110 THERAPEUTIC EXERCISES: CPT | Mod: PN

## 2018-04-25 NOTE — PROGRESS NOTES
Please See Full Physical Therapy Evaluation in Plan of Care                                                      Physical Therapy Initial Evaluation     Name: Brianna Coughlin  Clinic Number: [id number]    Diagnosis:   Encounter Diagnoses   Name Primary?    Weakness of right lower extremity     Difficulty walking     Decreased ROM of lumbar spine      Physician: Yas Mcclendon MD  Treatment Orders: PT Eval and Treat    Pt's spiritual, cultural and educational needs considered and pt agreeable to plan of care and goals as stated below:     Short Term GOALS: 4 weeks. Pt agrees with goals set.  1. Patient demonstrates independence with HEP.   2. Patient demonstrates independence with Postural Awareness.   3. Patient demonstrates independence with body mechanics.   4. Patient will report pain of 4/10 at worst, on 0-10 pain scale, with all activity  5. Patient demonstrates increased strength BLE's by 1/3 muscle grade or greater to improve tolerance to functional activities pain free.     Long Term GOALS: 8 weeks. Pt agrees with goals set.  1. Patient demonstrates increased thoracolumbar ROM to WNL to improve tolerance to functional activities pain free.   2. Patient demonstrates increased strength BLE's to 4+/5 or greater to improve tolerance to functional activities pain free.   3. Patient demonstrates improved overall function per FOTO Lumbar Survey to 40% Limitation or less.   4. Patient will report pain of 2/10 at worst, on 0-10 pain scale, with all activity  5. Patient demonstrates ability to perform forward bending, appropriate movement pattern, no pain provocation  6. Patient demonstrates ability to walk 1 mile, appropriate gait pattern, no pain provocation    Functional Limitations Reports - G Codes  Category: Mobility  Tool: FOTO Lumbar Survey  Score: 41% Limitation   Modifier  Impairment Limitation Restriction    CH  0 % impaired, limited or restricted    CI  @ least 1% but less than 20% impaired,  limited or restricted    CJ  @ least 20%<40% impaired, limited or restricted    CK  @ least 40%<60% impaired, limited or restricted    CL  @ least 60% <80% impaired, limited or restricted    CM  @ least 80%<100% impaired limited or restricted    CN  100% impaired, limited or restricted     Current/: CK = 40-60% Limitation   Goal/ : CJ = 20-40% Limitation    PLAN     Certification Period: 4/25/18 - 7/25/18    Outpatient physical therapy 1-2 times weekly to include: pt ed, hep, therapeutic exercises, neuromuscular re-education/ balance exercises, dry needling, manual therapy, joint mobilizations, aquatic therapy and modalities prn. Cont PT for  10-12 weeks. Pt may be seen by PTA as part of the rehabilitation team.     Therapist: Slick Guan, PT  4/25/2018    I have seen the patient, reviewed the therapist's plan of care, and I agree with the plan of care.      I certify the need for these services furnished under this plan of treatment and while under my care.     ___________________ ________ Physician/Referring Practitioner            ___________________________ Date of Signature
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
## 2018-05-01 NOTE — PROGRESS NOTES
"                                                    Physical Therapy Daily Note     Name: Brianna Coughlin  Clinic Number: [id number]  Diagnosis:   Encounter Diagnoses   Name Primary?    Weakness of right lower extremity Yes    Difficulty walking     Decreased ROM of lumbar spine      Physician: Yas Mcclendon MD  Precautions: standard   Visit #: 2 of 20  PTA Visit #: 1    Time In: 1300  Time Out: 1400  Total Treatment Time: 60    Subjective     Pt reports: that she is having some discomfort today.   Pain Scale: Brianna rates pain on a scale of 0-10 to be 2 currently.    Objective     Brianna received individual therapeutic exercises to develop strength, endurance, ROM, flexibility, posture and core stabilization for 42 minutes including:    Nustep x 6'   DKTC with ball 2' x 3-5'' hold  Piriformis Str 3x30" ea  HS Str c/ strap 2x30" ea  Bridges 2 x 10   Abd Bracing 2' x 5'' hold   HL Hip abduction GTB 3'   HL hip adduction x 3'   Supine Neural Flossing on RLE x 20   Prone hip extension 2 x 10   Prone HSC x 20   SLR with TrA 1 x 12 ea LE    Brianna received the following manual therapy techniques: Soft tissue Mobilization were applied to the: right pririformis for 8 minutes including:    MHP x 10'     Written Home Exercises Reviewed.   Pt demo good understanding of the education provided. Brianna demonstrated good return demonstration of activities.     Education provided re:  Brianna verbalized good understanding of education provided.   No spiritual or educational barriers to learning provided    Assessment     Patient tolerated treatment session well today.  Pt with good response to exercises without provocation of pain reported.  Patient with increased tissue restrictions throughout the right sided piriformis and gluteal musculature.  Pt resposnded well to manual care with decreased pain and improved tissue mobility achieved post session.   This is a 52 y.o. female referred to outpatient physical " therapy and presents with a medical diagnosis of Low Back Pain with Radiculopathy and demonstrates limitations as described in the problem list. Pt prognosis is Good. Pt will continue to benefit from skilled outpatient physical therapy to address the deficits listed in the problem list, provide pt/family education and to maximize pt's level of independence in the home and community environment.     Goals as follows:  Short Term GOALS: 4 weeks. Pt agrees with goals set.  1. Patient demonstrates independence with HEP.   2. Patient demonstrates independence with Postural Awareness.   3. Patient demonstrates independence with body mechanics.   4. Patient will report pain of 4/10 at worst, on 0-10 pain scale, with all activity  5. Patient demonstrates increased strength BLE's by 1/3 muscle grade or greater to improve tolerance to functional activities pain free.      Plan     Continue with established Plan of Care towards PT goals.    Therapist: Barbara Roman, PTA  5/1/2018

## 2018-05-02 ENCOUNTER — CLINICAL SUPPORT (OUTPATIENT)
Dept: REHABILITATION | Facility: HOSPITAL | Age: 53
End: 2018-05-02
Attending: FAMILY MEDICINE
Payer: COMMERCIAL

## 2018-05-02 DIAGNOSIS — R26.2 DIFFICULTY WALKING: ICD-10-CM

## 2018-05-02 DIAGNOSIS — R29.898 WEAKNESS OF RIGHT LOWER EXTREMITY: Primary | ICD-10-CM

## 2018-05-02 DIAGNOSIS — M53.86 DECREASED ROM OF LUMBAR SPINE: ICD-10-CM

## 2018-05-02 PROCEDURE — 97140 MANUAL THERAPY 1/> REGIONS: CPT | Mod: PN

## 2018-05-02 PROCEDURE — 97110 THERAPEUTIC EXERCISES: CPT | Mod: PN

## 2018-05-04 ENCOUNTER — TELEPHONE (OUTPATIENT)
Dept: FAMILY MEDICINE | Facility: CLINIC | Age: 53
End: 2018-05-04

## 2018-05-04 NOTE — TELEPHONE ENCOUNTER
Patient requesting to speak to Dr. Mcclendon, she would like Dr. Mcclendon to give her a call she has questions in regards to her labs. Please advise

## 2018-05-04 NOTE — TELEPHONE ENCOUNTER
All of her blood work is within an acceptable range.  She does have an elevation in her cholesterol, but she does not need to be on any medications. Her risk for having a heart attack and stroke are 1.9%.  She needs to focus on her diet and make the best decision.   Is there any other lab that she is worried about?

## 2018-05-04 NOTE — TELEPHONE ENCOUNTER
----- Message from Gael Cisse sent at 5/4/2018  9:25 AM CDT -----  Contact: TODD [phone]  Pt is requesting a call back from Dr. Mcclendon in regards to blood work and medication. Please call at your earliest convenience.

## 2018-05-04 NOTE — TELEPHONE ENCOUNTER
Patient advised all of her blood work is within an acceptable range.  She does have an elevation in her cholesterol, but she does not need to be on any medications. Her risk for having a heart attack and stroke are 1.9% per Dr. Mcclendon.  She needs to focus on her diet and make the best decision.   Is there any other lab that she is worried about?   Patient verbalized her understanding.

## 2018-05-09 ENCOUNTER — CLINICAL SUPPORT (OUTPATIENT)
Dept: REHABILITATION | Facility: HOSPITAL | Age: 53
End: 2018-05-09
Attending: FAMILY MEDICINE
Payer: COMMERCIAL

## 2018-05-09 DIAGNOSIS — R29.898 WEAKNESS OF RIGHT LOWER EXTREMITY: ICD-10-CM

## 2018-05-09 DIAGNOSIS — R26.2 DIFFICULTY WALKING: ICD-10-CM

## 2018-05-09 DIAGNOSIS — M53.86 DECREASED ROM OF LUMBAR SPINE: ICD-10-CM

## 2018-05-09 PROCEDURE — 97110 THERAPEUTIC EXERCISES: CPT | Mod: PN

## 2018-05-09 NOTE — PROGRESS NOTES
"                                                    Physical Therapy Daily Note     Name: Brianna Coughlin  Clinic Number: [id number]  Diagnosis:   Encounter Diagnoses   Name Primary?    Weakness of right lower extremity     Difficulty walking     Decreased ROM of lumbar spine      Physician: Yas Mcclednon MD  Precautions: standard   Visit #: 3 of 20  PTA Visit #: 1    Time In: 1300  Time Out: 1410  Total Treatment Time: 70    Subjective      Pt reports: today is a good day, she is getting better but not 100%.   Pain Scale: Brianna rates pain on a scale of 0-10 to be 1 currently.    Objective     Brianna received individual therapeutic exercises to develop strength, endurance, ROM, flexibility, posture and core stabilization for 60 minutes including:    Nustep x 6'   +Calf Str c/ incline board 3x30"  +Trunk Flexion 3-way c/ ball 3 min  +Shuttle BLE Squat 2-black 2x15  +Shuttle Piriformis Str 3x30" ea    DKTC with ball 2x10 3'' hold  +LTR c/ ball 2x10  HS Str c/ strap 2x30" ea  Bridges 3 x 10   Abd Bracing 2x10 x 5'' hold   HL Hip abduction [id number]  HL hip adduction c/ ballx 3x10  Supine Neural Glide on RLE x 30   Prone hip extension 2 x 10   Prone HSC x 20   SLR with TrA 2 x 10 ea LE    Brianna received the following manual therapy techniques: Soft tissue Mobilization were applied to the: right piriformis for 0 minutes including:    MHP x 10'     Written Home Exercises Reviewed.   Pt demo good understanding of the education provided. Brianna demonstrated good return demonstration of activities.     Education provided re:  Brianna verbalized good understanding of education provided.   No spiritual or educational barriers to learning provided    Assessment     Patient tolerated treatment session well today. Good performance with introduction to resistance training on shuttle equipment, no pain provoked, appropriate muscle fatigue achieved. No signs of neural tension or significant posterior LE pain " provocation throughout treatment session. Pt prepared to progress towards increased strength, endurance, flexibility, and lifting  training next session pending symptoms.     This is a 53 y.o. female referred to outpatient physical therapy and presents with a medical diagnosis of Low Back Pain with Radiculopathy and demonstrates limitations as described in the problem list. Pt prognosis is Good. Pt will continue to benefit from skilled outpatient physical therapy to address the deficits listed in the problem list, provide pt/family education and to maximize pt's level of independence in the home and community environment.     Goals as follows:  Short Term GOALS: 4 weeks. Pt agrees with goals set.  1. Patient demonstrates independence with HEP.   2. Patient demonstrates independence with Postural Awareness.   3. Patient demonstrates independence with body mechanics.   4. Patient will report pain of 4/10 at worst, on 0-10 pain scale, with all activity  5. Patient demonstrates increased strength BLE's by 1/3 muscle grade or greater to improve tolerance to functional activities pain free.      Plan     Certification Period: 4/25/18 - 7/25/18    Continue with established Plan of Care towards PT goals.    Therapist: Slick Guan, PT  5/9/2018

## 2018-05-15 DIAGNOSIS — M54.16 RIGHT LUMBAR RADICULOPATHY: ICD-10-CM

## 2018-05-15 RX ORDER — GABAPENTIN 600 MG/1
TABLET ORAL
Qty: 120 TABLET | Refills: 1 | Status: SHIPPED | OUTPATIENT
Start: 2018-05-15 | End: 2018-07-26 | Stop reason: SDUPTHER

## 2018-05-16 ENCOUNTER — CLINICAL SUPPORT (OUTPATIENT)
Dept: REHABILITATION | Facility: HOSPITAL | Age: 53
End: 2018-05-16
Attending: FAMILY MEDICINE
Payer: COMMERCIAL

## 2018-05-16 DIAGNOSIS — M54.41 CHRONIC MIDLINE LOW BACK PAIN WITH RIGHT-SIDED SCIATICA: Primary | ICD-10-CM

## 2018-05-16 DIAGNOSIS — R29.898 WEAKNESS OF RIGHT LOWER EXTREMITY: ICD-10-CM

## 2018-05-16 DIAGNOSIS — G89.29 CHRONIC MIDLINE LOW BACK PAIN WITH RIGHT-SIDED SCIATICA: Primary | ICD-10-CM

## 2018-05-16 DIAGNOSIS — M53.86 DECREASED ROM OF LUMBAR SPINE: ICD-10-CM

## 2018-05-16 DIAGNOSIS — R26.2 DIFFICULTY WALKING: ICD-10-CM

## 2018-05-16 PROCEDURE — 97110 THERAPEUTIC EXERCISES: CPT | Mod: PN

## 2018-05-16 NOTE — PROGRESS NOTES
"                                                    Physical Therapy Daily Note     Name: Brianna Coughlin  Clinic Number: [id number]  Diagnosis:   Encounter Diagnoses   Name Primary?    Weakness of right lower extremity     Difficulty walking     Decreased ROM of lumbar spine     Chronic midline low back pain with right-sided sciatica Yes     Physician: Yas Mcclendon MD  Precautions: standard   Visit #: 4 of 20  PTA Visit #: 0    Time In: 1300  Time Out: 1410  Total Treatment Time: 70    Subjective      Pt reports: She is getting more flexible. Still has R groin/anterior thigh and knee pain with prolonged walking  Pain Scale: Brianna rates pain on a scale of 0-10 to be 1 currently.    Objective     Brianna received individual therapeutic exercises to develop strength, endurance, ROM, flexibility, posture and core stabilization for 60 minutes including:    Nustep x 6'   Treadmill 5 min 2.0mph   Calf Str c/ incline board 3x30"  Trunk Flexion 3-way c/ ball 3 min  Shuttle BLE Squat 2-black 2x15  Shuttle Piriformis Str 2x30" ea  +Standing Hip Abd/Ext c/ YTB 2x10 ea    DKTC with ball 2x10 3'' hold  LTR c/ ball 2x10  HS Str c/ strap 2x30" ea  Bridges 3 x 10   Abd Bracing 2x10 x 5'' hold   HL Hip abduction Blue TB 2x10  HL hip adduction c/ ballx 2x10  Supine Neural Glide on RLE x 20   Prone hip extension 2 x 10   Prone HSC x 20   Prone Quad Str c/ strap 2x30"  SLR with TrA 2 x 10 ea LE    Brianna received the following manual therapy techniques: Soft tissue Mobilization were applied to the: right piriformis for 0 minutes including:  Manual Lumbar Traction c/ ball and belt      MHP x 10'     Written Home Exercises Reviewed.   Pt demo good understanding of the education provided. Brianna demonstrated good return demonstration of activities.     Education provided re:  Brianna verbalized good understanding of education provided.   No spiritual or educational barriers to learning provided    Assessment     Patient " tolerated treatment session well today. Mild provocation of R anterior groin/thigh/knee pain with aerobic warm-up on treadmill; symptoms completely resolved following manual lumbar traction due to decreased compression of lumbar spine. Pt with good response to hip strengthening in standing, appropriate exercise form maintained, easily achieved muscle fatigue.     This is a 53 y.o. female referred to outpatient physical therapy and presents with a medical diagnosis of Low Back Pain with Radiculopathy and demonstrates limitations as described in the problem list. Pt prognosis is Good. Pt will continue to benefit from skilled outpatient physical therapy to address the deficits listed in the problem list, provide pt/family education and to maximize pt's level of independence in the home and community environment.     Goals as follows:  Short Term GOALS: 4 weeks. Pt agrees with goals set.  1. Patient demonstrates independence with HEP.   2. Patient demonstrates independence with Postural Awareness.   3. Patient demonstrates independence with body mechanics.   4. Patient will report pain of 4/10 at worst, on 0-10 pain scale, with all activity  5. Patient demonstrates increased strength BLE's by 1/3 muscle grade or greater to improve tolerance to functional activities pain free.      Plan     Certification Period: 4/25/18 - 7/25/18    Continue with established Plan of Care towards PT goals.    Therapist: Slick Guan, PT  5/16/2018

## 2018-05-23 ENCOUNTER — CLINICAL SUPPORT (OUTPATIENT)
Dept: REHABILITATION | Facility: HOSPITAL | Age: 53
End: 2018-05-23
Attending: FAMILY MEDICINE
Payer: COMMERCIAL

## 2018-05-23 DIAGNOSIS — R26.2 DIFFICULTY WALKING: ICD-10-CM

## 2018-05-23 DIAGNOSIS — R29.898 WEAKNESS OF RIGHT LOWER EXTREMITY: Primary | ICD-10-CM

## 2018-05-23 DIAGNOSIS — M53.86 DECREASED ROM OF LUMBAR SPINE: ICD-10-CM

## 2018-05-23 DIAGNOSIS — G89.29 CHRONIC MIDLINE LOW BACK PAIN WITH RIGHT-SIDED SCIATICA: ICD-10-CM

## 2018-05-23 DIAGNOSIS — M54.41 CHRONIC MIDLINE LOW BACK PAIN WITH RIGHT-SIDED SCIATICA: ICD-10-CM

## 2018-05-23 PROCEDURE — 97110 THERAPEUTIC EXERCISES: CPT | Mod: PN

## 2018-05-23 NOTE — PROGRESS NOTES
"                                                    Physical Therapy Daily Note     Name: Brianna Coughlin  Clinic Number: [id number]  Diagnosis:   Encounter Diagnoses   Name Primary?    Weakness of right lower extremity Yes    Difficulty walking     Decreased ROM of lumbar spine     Chronic midline low back pain with right-sided sciatica      Physician: Yas Mcclendon MD  Precautions: standard   Visit #: 5 of 20  PTA Visit #: 0    Time In: 1300  Time Out: 1410  Total Treatment Time: 70 mins    Subjective      Pt reports: She still feels pain/numbness with long distance walking. Notices mild pain in R hip/groin when she goes up stairs  Pain Scale: Brianna rates pain on a scale of 0-10 to be 1 currently.    Objective     Brianna received individual therapeutic exercises to develop strength, endurance, ROM, flexibility, posture and core stabilization for 60 minutes including:    Nustep x 6'   Treadmill 5 min 2.0mph   Calf Str c/ incline board 3x30"  Trunk Flexion 3-way c/ ball 3 min  Shuttle BLE Squat 2-black 2x15  Shuttle Piriformis Str 2x30" ea  Standing Hip Abd/Ext c/ YTB 2x10 ea  +Step-up 6" 2x10    DKTC with ball 2x10 3'' hold  LTR c/ ball 2x10  HS Str c/ strap 2x30" ea  Bridges 3 x 10   Abd Bracing 2x10 x 5'' hold   HL Hip abduction Blue TB 2x10  HL hip adduction c/ ballx 2x10  Supine Neural Glide on RLE x 20   Prone hip extension 2 x 10   Prone HSC x 20   Prone Quad Str c/ strap 2x30"  SLR with TrA 2 x 10 ea LE  +Resisted LTR c/ GTB 20x ea    Brianna received the following manual therapy techniques: Soft tissue Mobilization were applied to the: right piriformis for 0 minutes including:  Manual Lumbar Traction c/ ball and belt      MHP x 10'     Written Home Exercises Reviewed.   Pt demo good understanding of the education provided. Brianna demonstrated good return demonstration of activities.     Education provided re:  Brianna verbalized good understanding of education provided.   No spiritual or " educational barriers to learning provided    Assessment     Patient tolerated treatment session well today. Pt with no symptom provocation during stair training, however mild R anterior thigh/groin pain during gait training with aerobic warm-up this session. No signs of posterior neural tension during recent treatmetn sessions, Pt with greater symptom provocation in anterior hip/thigh region indicating decreased Sciatic N involvement, possible L2-3 radicular pathology.    This is a 53 y.o. female referred to outpatient physical therapy and presents with a medical diagnosis of Low Back Pain with Radiculopathy and demonstrates limitations as described in the problem list. Pt prognosis is Good. Pt will continue to benefit from skilled outpatient physical therapy to address the deficits listed in the problem list, provide pt/family education and to maximize pt's level of independence in the home and community environment.     Goals as follows:  Short Term GOALS: 4 weeks. Pt agrees with goals set.  1. Patient demonstrates independence with HEP.   2. Patient demonstrates independence with Postural Awareness.   3. Patient demonstrates independence with body mechanics.   4. Patient will report pain of 4/10 at worst, on 0-10 pain scale, with all activity  5. Patient demonstrates increased strength BLE's by 1/3 muscle grade or greater to improve tolerance to functional activities pain free.      Plan     Certification Period: 4/25/18 - 7/25/18    Continue with established Plan of Care towards PT goals.    Therapist: Slick Guan, PT  5/23/2018

## 2018-06-08 ENCOUNTER — LAB VISIT (OUTPATIENT)
Dept: LAB | Facility: HOSPITAL | Age: 53
End: 2018-06-08
Attending: FAMILY MEDICINE
Payer: COMMERCIAL

## 2018-06-08 ENCOUNTER — OFFICE VISIT (OUTPATIENT)
Dept: FAMILY MEDICINE | Facility: CLINIC | Age: 53
End: 2018-06-08
Payer: COMMERCIAL

## 2018-06-08 VITALS
SYSTOLIC BLOOD PRESSURE: 110 MMHG | HEIGHT: 64 IN | BODY MASS INDEX: 29.7 KG/M2 | HEART RATE: 54 BPM | OXYGEN SATURATION: 97 % | TEMPERATURE: 98 F | DIASTOLIC BLOOD PRESSURE: 82 MMHG | WEIGHT: 173.94 LBS

## 2018-06-08 DIAGNOSIS — E78.01 FAMILIAL HYPERCHOLESTEROLEMIA: ICD-10-CM

## 2018-06-08 DIAGNOSIS — E78.01 FAMILIAL HYPERCHOLESTEROLEMIA: Primary | ICD-10-CM

## 2018-06-08 DIAGNOSIS — Z82.49 FHX: CORONARY ARTERY DISEASE: ICD-10-CM

## 2018-06-08 LAB
CHOLEST SERPL-MCNC: 213 MG/DL
CHOLEST/HDLC SERPL: 4.1 {RATIO}
HDLC SERPL-MCNC: 52 MG/DL
HDLC SERPL: 24.4 %
LDLC SERPL CALC-MCNC: 138.6 MG/DL
NONHDLC SERPL-MCNC: 161 MG/DL
TRIGL SERPL-MCNC: 112 MG/DL

## 2018-06-08 PROCEDURE — 99999 PR PBB SHADOW E&M-EST. PATIENT-LVL III: CPT | Mod: PBBFAC,,, | Performed by: FAMILY MEDICINE

## 2018-06-08 PROCEDURE — 80061 LIPID PANEL: CPT

## 2018-06-08 PROCEDURE — 3008F BODY MASS INDEX DOCD: CPT | Mod: CPTII,S$GLB,, | Performed by: FAMILY MEDICINE

## 2018-06-08 PROCEDURE — 3079F DIAST BP 80-89 MM HG: CPT | Mod: CPTII,S$GLB,, | Performed by: FAMILY MEDICINE

## 2018-06-08 PROCEDURE — 36415 COLL VENOUS BLD VENIPUNCTURE: CPT | Mod: PO

## 2018-06-08 PROCEDURE — 3074F SYST BP LT 130 MM HG: CPT | Mod: CPTII,S$GLB,, | Performed by: FAMILY MEDICINE

## 2018-06-08 PROCEDURE — 99214 OFFICE O/P EST MOD 30 MIN: CPT | Mod: S$GLB,,, | Performed by: FAMILY MEDICINE

## 2018-06-08 RX ORDER — MULTIVIT WITH MINERALS/HERBS
1 TABLET ORAL DAILY
COMMUNITY
End: 2019-08-28

## 2018-06-08 NOTE — PROGRESS NOTES
Assessment & Plan  Problem List Items Addressed This Visit        Unprioritized    Familial hypercholesterolemia - Primary    Relevant Orders    Lipid panel  -   May need medication. Has been working on diet   -  If improved continue with lifestyle modifications       FHx: coronary artery disease  -   Noted             Health Maintenance reviewed    Follow-up: Follow-up in about 3 months (around 9/8/2018).    ______________________________________________________________________    Chief Complaint  Chief Complaint   Patient presents with    Results       HPI  Brianna Coughlin is a 53 y.o. female with multiple medical diagnoses as listed in the medical history and problem list that presents for hyperlipidemia.  Pt is known to me with last appointment 4/13/2018.      She has noted elevated cholesterol.  Patient denies any new symptoms including chest pain, SOB, blurry vision, N/V, diarrhea.  She has made diet modifications     PAST MEDICAL HISTORY:  Past Medical History:   Diagnosis Date    Abnormal Pap smear     Anxiety     Breast cyst     Dysplasia of cervix     Fibroids     GERD (gastroesophageal reflux disease)     Hypertension     Ovarian cyst        PAST SURGICAL HISTORY:  Past Surgical History:   Procedure Laterality Date    CHOLECYSTECTOMY      conization of cervix      TUBAL LIGATION         SOCIAL HISTORY:  Social History     Social History    Marital status: Single     Spouse name: N/A    Number of children: N/A    Years of education: N/A     Occupational History    Not on file.     Social History Main Topics    Smoking status: Never Smoker    Smokeless tobacco: Never Used    Alcohol use 0.0 oz/week      Comment: socially    Drug use: No    Sexual activity: Not Currently     Partners: Male     Other Topics Concern    Not on file     Social History Narrative    No narrative on file       FAMILY HISTORY:  Family History   Problem Relation Age of Onset    Hypertension Mother      Heart disease Mother     Heart disease Father     Stroke Maternal Grandfather        ALLERGIES AND MEDICATIONS: updated and reviewed.  Review of patient's allergies indicates:   Allergen Reactions    Acetaminophen-codeine Other (See Comments)     Stomach pains    Mobic [meloxicam] Other (See Comments)     STOMACH ULCERS    Iodine Other (See Comments)    Penicillins Nausea And Vomiting    Sulfa (sulfonamide antibiotics) Nausea And Vomiting     Other reaction(s): Rash    Unable to assess      MEDICINES CONTAINING ASPIRIN AFFECT HER STOMACH/STOMACH ULCER     Current Outpatient Prescriptions   Medication Sig Dispense Refill    ALPRAZolam (XANAX) 0.5 MG tablet TAKE ONE TABLET AS DIRECTED AS NEEDED 30 tablet 2    b complex vitamins tablet Take 1 tablet by mouth once daily.      cetirizine (ZYRTEC) 10 MG tablet Take 10 mg by mouth once daily.      co-enzyme Q-10 30 mg capsule Take 30 mg by mouth 3 (three) times daily.      FLUCELVAX QUAD 4051-1920, PF, 60 mcg (15 mcg x 4)/0.5 mL Syrg vaccine inject  0    gabapentin (NEURONTIN) 600 MG tablet TAKE ONE TABLET BY MOUTH FOUR TIMES DAILY 120 tablet 1    multivitamin (ONE DAILY MULTIVITAMIN) per tablet Take 1 tablet by mouth once daily.      nadolol (CORGARD) 80 MG tablet Take 1 tablet (80 mg total) by mouth once daily. 90 tablet 3    naltrexone-bupropion 8-90 mg TbSR Take 2 tablets by mouth 2 (two) times daily. 180 tablet 0    omeprazole (PRILOSEC) 40 MG capsule Take 1 capsule (40 mg total) by mouth once daily. Needs an appointment before additional refills provided. 90 capsule 2    baclofen (LIORESAL) 10 MG tablet Take 1 tablet (10 mg total) by mouth 3 (three) times daily as needed (muscle spasms). 60 tablet 1    calcium carb &cit-vit D3 (CITRACAL + D SLOW RELEASE) 600 mg calcium- 500 unit TbSR Take 1 tablet by mouth Daily. 100 tablet prn    valACYclovir (VALTREX) 1000 MG tablet Take 2 tablets (2,000 mg total) by mouth every 12 (twelve) hours. 4 tablet 0  "    No current facility-administered medications for this visit.          ROS  Review of Systems   Constitutional: Negative for activity change, appetite change, fatigue, fever and unexpected weight change.   HENT: Negative.  Negative for ear discharge, ear pain, rhinorrhea and sore throat.    Eyes: Negative.    Respiratory: Negative for apnea, cough, chest tightness, shortness of breath and wheezing.    Cardiovascular: Negative for chest pain, palpitations and leg swelling.   Gastrointestinal: Negative for abdominal distention, abdominal pain, constipation, diarrhea and vomiting.   Endocrine: Negative for cold intolerance, heat intolerance, polydipsia and polyuria.   Genitourinary: Negative for decreased urine volume, menstrual problem, urgency, vaginal bleeding, vaginal discharge and vaginal pain.   Musculoskeletal: Negative.    Skin: Negative for rash.   Neurological: Negative for dizziness and headaches.   Hematological: Does not bruise/bleed easily.   Psychiatric/Behavioral: Negative for agitation, sleep disturbance and suicidal ideas.           Physical Exam  Vitals:    06/08/18 0732   BP: 110/82   BP Location: Left arm   Patient Position: Sitting   BP Method: Large (Manual)   Pulse: (!) 54   Temp: 98.4 °F (36.9 °C)   TempSrc: Oral   SpO2: 97%   Weight: 78.9 kg (173 lb 15.1 oz)   Height: 5' 4" (1.626 m)    Body mass index is 29.86 kg/m².  Weight: 78.9 kg (173 lb 15.1 oz)   Height: 5' 4" (162.6 cm)   Physical Exam   Constitutional: She is oriented to person, place, and time. She appears well-developed and well-nourished.   HENT:   Head: Normocephalic.   Right Ear: External ear normal.   Left Ear: External ear normal.   Nose: Nose normal.   Mouth/Throat: Oropharynx is clear and moist.   Eyes: Conjunctivae and EOM are normal. Pupils are equal, round, and reactive to light.   Cardiovascular: Normal rate, regular rhythm and normal heart sounds.    Pulmonary/Chest: Effort normal and breath sounds normal. "   Neurological: She is alert and oriented to person, place, and time.   Skin: Skin is warm and dry.   Vitals reviewed.        Health Maintenance       Date Due Completion Date    Hepatitis C Screening 1965 ---    TETANUS VACCINE 05/06/1983 ---    Colonoscopy 05/06/2015 ---    Influenza Vaccine 08/01/2018 ---    Mammogram 01/24/2020 1/24/2018    Pap Smear with HPV Cotest 03/03/2020 3/3/2017    Lipid Panel 04/13/2023 4/13/2018

## 2018-06-11 ENCOUNTER — TELEPHONE (OUTPATIENT)
Dept: FAMILY MEDICINE | Facility: CLINIC | Age: 53
End: 2018-06-11

## 2018-06-11 NOTE — TELEPHONE ENCOUNTER
----- Message from KESHAWN Cleary sent at 6/11/2018  9:36 AM CDT -----  Please inform patient to continue focusing on diet. Her total cholesterol has improved from 256 to 213. Her LDL (bad cholesterol) has improved from 163 to 138. Will reevaluate in 3 months.

## 2018-06-14 DIAGNOSIS — Z11.59 NEED FOR HEPATITIS C SCREENING TEST: ICD-10-CM

## 2018-07-26 DIAGNOSIS — M54.16 RIGHT LUMBAR RADICULOPATHY: ICD-10-CM

## 2018-07-26 RX ORDER — GABAPENTIN 600 MG/1
TABLET ORAL
Qty: 120 TABLET | Refills: 1 | Status: SHIPPED | OUTPATIENT
Start: 2018-07-26 | End: 2018-09-06 | Stop reason: SDUPTHER

## 2018-09-06 DIAGNOSIS — M54.16 RIGHT LUMBAR RADICULOPATHY: ICD-10-CM

## 2018-09-06 RX ORDER — GABAPENTIN 600 MG/1
TABLET ORAL
Qty: 120 TABLET | Refills: 1 | Status: SHIPPED | OUTPATIENT
Start: 2018-09-06 | End: 2018-11-13 | Stop reason: SDUPTHER

## 2018-09-06 NOTE — TELEPHONE ENCOUNTER
gabapentin (NEURONTIN) 600 MG tablet, asking that the refill be called in for 90 pills instead of the 120 says she is not needing to take the 2 at night any longer. Please call.      07/26/18 last Rx refill  02/16/17 last Office visit

## 2018-09-10 ENCOUNTER — OFFICE VISIT (OUTPATIENT)
Dept: URGENT CARE | Facility: CLINIC | Age: 53
End: 2018-09-10
Payer: COMMERCIAL

## 2018-09-10 VITALS
DIASTOLIC BLOOD PRESSURE: 75 MMHG | TEMPERATURE: 98 F | RESPIRATION RATE: 16 BRPM | HEIGHT: 64 IN | HEART RATE: 82 BPM | OXYGEN SATURATION: 98 % | SYSTOLIC BLOOD PRESSURE: 119 MMHG | WEIGHT: 173 LBS | BODY MASS INDEX: 29.53 KG/M2

## 2018-09-10 DIAGNOSIS — R05.9 COUGH IN ADULT: ICD-10-CM

## 2018-09-10 DIAGNOSIS — J01.00 ACUTE NON-RECURRENT MAXILLARY SINUSITIS: Primary | ICD-10-CM

## 2018-09-10 DIAGNOSIS — R52 BODY ACHES: ICD-10-CM

## 2018-09-10 DIAGNOSIS — R09.82 POST-NASAL DRIP: ICD-10-CM

## 2018-09-10 DIAGNOSIS — J02.9 PHARYNGITIS, UNSPECIFIED ETIOLOGY: ICD-10-CM

## 2018-09-10 PROCEDURE — 3074F SYST BP LT 130 MM HG: CPT | Mod: CPTII,S$GLB,, | Performed by: NURSE PRACTITIONER

## 2018-09-10 PROCEDURE — 3008F BODY MASS INDEX DOCD: CPT | Mod: CPTII,S$GLB,, | Performed by: NURSE PRACTITIONER

## 2018-09-10 PROCEDURE — 99214 OFFICE O/P EST MOD 30 MIN: CPT | Mod: S$GLB,,, | Performed by: NURSE PRACTITIONER

## 2018-09-10 PROCEDURE — 3078F DIAST BP <80 MM HG: CPT | Mod: CPTII,S$GLB,, | Performed by: NURSE PRACTITIONER

## 2018-09-10 RX ORDER — PROMETHAZINE HYDROCHLORIDE AND DEXTROMETHORPHAN HYDROBROMIDE 6.25; 15 MG/5ML; MG/5ML
5 SYRUP ORAL EVERY 4 HOURS PRN
Qty: 120 ML | Refills: 0 | Status: SHIPPED | OUTPATIENT
Start: 2018-09-10 | End: 2018-09-20

## 2018-09-10 RX ORDER — AZELASTINE 1 MG/ML
2 SPRAY, METERED NASAL 2 TIMES DAILY
Qty: 30 ML | Refills: 0 | Status: SHIPPED | OUTPATIENT
Start: 2018-09-10 | End: 2019-03-04

## 2018-09-10 RX ORDER — AZITHROMYCIN 250 MG/1
TABLET, FILM COATED ORAL
Qty: 6 TABLET | Refills: 0 | Status: SHIPPED | OUTPATIENT
Start: 2018-09-10 | End: 2019-03-04

## 2018-09-10 NOTE — PROGRESS NOTES
"Subjective:       Patient ID: Brianna Coughlin is a 53 y.o. female.    Vitals:  height is 5' 4" (1.626 m) and weight is 78.5 kg (173 lb). Her temperature is 97.7 °F (36.5 °C). Her blood pressure is 119/75 and her pulse is 82. Her respiration is 16 and oxygen saturation is 98%.     Chief Complaint: Cough    Patient presents to the clinic with complaints of being ill for over a week.  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  States she has progressively been getting worse over the last 2 days.  The cough is keeping her up and she was running a low-grade fever and having body aches yesterday.  Also having facial pressure.  Has been taking OTC medication without relief.      Cough   This is a new problem. The current episode started 1 to 4 weeks ago. The problem has been unchanged. The problem occurs constantly. The cough is non-productive. Associated symptoms include a fever, headaches, postnasal drip and a sore throat. Pertinent negatives include no chest pain, chills, ear pain, eye redness, myalgias, rash, shortness of breath or wheezing. Nothing aggravates the symptoms. She has tried OTC cough suppressant for the symptoms. The treatment provided no relief.     Review of Systems   Constitution: Positive for fever and malaise/fatigue. Negative for chills.   HENT: Positive for congestion, postnasal drip and sore throat. Negative for ear pain, hoarse voice, odynophagia and stridor.    Eyes: Negative for discharge and redness.   Cardiovascular: Negative for chest pain, dyspnea on exertion and leg swelling.   Respiratory: Positive for cough. Negative for shortness of breath, sputum production and wheezing.    Hematologic/Lymphatic: Positive for adenopathy.   Skin: Negative for color change and rash.   Musculoskeletal: Negative for myalgias.   Gastrointestinal: Negative for abdominal pain and nausea.   Neurological: Positive for headaches. Negative for dizziness and light-headedness.   All other " systems reviewed and are negative.      Objective:      Physical Exam   Constitutional: She is oriented to person, place, and time. Vital signs are normal. She appears well-developed and well-nourished.  Non-toxic appearance. She does not have a sickly appearance. She does not appear ill.   HENT:   Head: Normocephalic and atraumatic.   Right Ear: Hearing, external ear and ear canal normal. Tympanic membrane mobility is abnormal. A middle ear effusion is present.   Left Ear: Hearing, external ear and ear canal normal. Tympanic membrane mobility is abnormal. A middle ear effusion is present.   Nose: Mucosal edema and rhinorrhea present. Right sinus exhibits maxillary sinus tenderness. Right sinus exhibits no frontal sinus tenderness. Left sinus exhibits maxillary sinus tenderness. Left sinus exhibits no frontal sinus tenderness.   Mouth/Throat: Uvula is midline and mucous membranes are normal. Posterior oropharyngeal erythema ( with cobblestoning and drainage) present.   Eyes: Conjunctivae are normal.   Neck: Normal range of motion, full passive range of motion without pain and phonation normal. Neck supple. No spinous process tenderness and no muscular tenderness present. No neck rigidity. Normal range of motion present.   Cardiovascular: Normal rate, regular rhythm and normal heart sounds.   Pulmonary/Chest: Effort normal and breath sounds normal.   Dry hacking cough on assessment   Musculoskeletal: Normal range of motion.   Lymphadenopathy:     She has cervical adenopathy.        Right cervical: Superficial cervical adenopathy present.        Left cervical: Superficial cervical adenopathy present.   Neurological: She is alert and oriented to person, place, and time.   Skin: Skin is warm and dry. Capillary refill takes less than 2 seconds.   Psychiatric: She has a normal mood and affect.   Nursing note and vitals reviewed.      Assessment:       1. Acute non-recurrent maxillary sinusitis    2. Post-nasal drip    3.  Cough in adult    4. Body aches    5. Pharyngitis, unspecified etiology        Plan:       Discussed continuing Mucinex with plenty of water.  Patient verbalized understanding.  Acute non-recurrent maxillary sinusitis  -     azithromycin (Z-ANDRÉS) 250 MG tablet; Take 2 tablets by mouth on day 1; Take 1 tablet by mouth on days 2-5  Dispense: 6 tablet; Refill: 0  -     azelastine (ASTELIN) 137 mcg (0.1 %) nasal spray; 2 sprays (274 mcg total) by Nasal route 2 (two) times daily.  Dispense: 30 mL; Refill: 0    Post-nasal drip  -     azelastine (ASTELIN) 137 mcg (0.1 %) nasal spray; 2 sprays (274 mcg total) by Nasal route 2 (two) times daily.  Dispense: 30 mL; Refill: 0    Cough in adult  -     promethazine-dextromethorphan (PROMETHAZINE-DM) 6.25-15 mg/5 mL Syrp; Take 5 mLs by mouth every 4 (four) hours as needed (cough). CAUTION: MAY CAUSE DROWSINESS  Dispense: 120 mL; Refill: 0    Body aches    Pharyngitis, unspecified etiology      Patient Instructions     Please drink plenty of fluids.  Please get plenty of rest.  Please return here or go to the Emergency Department for any concerns or worsening of condition.  If you were given wait & see antibiotics, please wait 3-5 days before taking them, and only take them if your symptoms have worsened or not improved.  If you do begin taking the antibiotics, please take them to completion.  If you were prescribed antibiotics, please take them to completion.  If you were prescribed a narcotic medication, do not drive or operate heavy equipment or machinery while taking these medications.  If you do not have Hypertension or any history of palpitations, it is ok to take over the counter Sudafed or Mucinex D or Allegra-D or Claritin-D or Zyrtec-D.  If you do take one of the above, it is ok to combine that with plain over the counter Mucinex or Allegra or Claritin or Zyrtec.  If for example you are taking Zyrtec -D, you can combine that with Mucinex, but not Mucinex-D.  If you are  taking Mucinex-D, you can combine that with plain Allegra or Claritin or Zyrtec.   If you do have Hypertension or palpitations, it is safe to take Coricidin HBP for relief of sinus symptoms.  We recommend you take over the counter Flonase (Fluticasone) or another nasally inhaled steroid unless you are already taking one.  Nasal irrigation with a saline spray or Netti Pot like device per their directions is also recommended.  If not allergic, please take over the counter Tylenol (Acetaminophen) and/or Motrin (Ibuprofen) as directed for control of pain and/or fever.  Please follow up with your primary care doctor or specialist as needed.    If you  smoke, please stop smoking.    Sinusitis (Antibiotic Treatment)    The sinuses are air-filled spaces within the bones of the face. They connect to the inside of the nose. Sinusitis is an inflammation of the tissue lining the sinus cavity. Sinus inflammation can occur during a cold. It can also be due to allergies to pollens and other particles in the air. Sinusitis can cause symptoms of sinus congestion and fullness. A sinus infection causes fever, headache and facial pain. There is often green or yellow drainage from the nose or into the back of the throat (post-nasal drip). You have been given antibiotics to treat this condition.  Home care:  · Take the full course of antibiotics as instructed. Do not stop taking them, even if you feel better.  · Drink plenty of water, hot tea, and other liquids. This may help thin mucus. It also may promote sinus drainage.  · Heat may help soothe painful areas of the face. Use a towel soaked in hot water. Or,  the shower and direct the hot spray onto your face. Using a vaporizer along with a menthol rub at night may also help.   · An expectorant containing guaifenesin may help thin the mucus and promote drainage from the sinuses.  · Over-the-counter decongestants may be used unless a similar medicine was prescribed. Nasal sprays  work the fastest. Use one that contains phenylephrine or oxymetazoline. First blow the nose gently. Then use the spray. Do not use these medicines more often than directed on the label or symptoms may get worse. You may also use tablets containing pseudoephedrine. Avoid products that combine ingredients, because side effects may be increased. Read labels. You can also ask the pharmacist for help. (NOTE: Persons with high blood pressure should not use decongestants. They can raise blood pressure.)  · Over-the-counter antihistamines may help if allergies contributed to your sinusitis.    · Do not use nasal rinses or irrigation during an acute sinus infection, unless told to by your health care provider. Rinsing may spread the infection to other sinuses.  · Use acetaminophen or ibuprofen to control pain, unless another pain medicine was prescribed. (If you have chronic liver or kidney disease or ever had a stomach ulcer, talk with your doctor before using these medicines. Aspirin should never be used in anyone under 18 years of age who is ill with a fever. It may cause severe liver damage.)  · Don't smoke. This can worsen symptoms.  Follow-up care  Follow up with your healthcare provider or our staff if you are not improving within the next week.  When to seek medical advice  Call your healthcare provider if any of these occur:  · Facial pain or headache becoming more severe  · Stiff neck  · Unusual drowsiness or confusion  · Swelling of the forehead or eyelids  · Vision problems, including blurred or double vision  · Fever of 100.4ºF (38ºC) or higher, or as directed by your healthcare provider  · Seizure  · Breathing problems  · Symptoms not resolving within 10 days  Date Last Reviewed: 4/13/2015 © 2000-2017 [employer]. [address] [place] [address], [place] [address]. All rights reserved. This information is not intended as a substitute for professional medical care. Always follow your healthcare  professional's instructions.

## 2018-09-10 NOTE — LETTER
September 10, 2018      [facility] [address], [address] [place] [address]  Phone: [phone]  Fax: [phone]       Patient: Brianna Coughlin   YOB: 1965  Date of Visit: 09/10/2018    To Whom It May Concern:    Santos Coughlin  was at [facility] on 09/10/2018. She may return to work/school on 09/12/18 with no restrictions. If you have any questions or concerns, or if I can be of further assistance, please do not hesitate to contact me.    Sincerely,    Darling Peters NP

## 2018-09-10 NOTE — PATIENT INSTRUCTIONS
Please drink plenty of fluids.  Please get plenty of rest.  Please return here or go to the Emergency Department for any concerns or worsening of condition.  If you were given wait & see antibiotics, please wait 3-5 days before taking them, and only take them if your symptoms have worsened or not improved.  If you do begin taking the antibiotics, please take them to completion.  If you were prescribed antibiotics, please take them to completion.  If you were prescribed a narcotic medication, do not drive or operate heavy equipment or machinery while taking these medications.  If you do not have Hypertension or any history of palpitations, it is ok to take over the counter Sudafed or Mucinex D or Allegra-D or Claritin-D or Zyrtec-D.  If you do take one of the above, it is ok to combine that with plain over the counter Mucinex or Allegra or Claritin or Zyrtec.  If for example you are taking Zyrtec -D, you can combine that with Mucinex, but not Mucinex-D.  If you are taking Mucinex-D, you can combine that with plain Allegra or Claritin or Zyrtec.   If you do have Hypertension or palpitations, it is safe to take Coricidin HBP for relief of sinus symptoms.  We recommend you take over the counter Flonase (Fluticasone) or another nasally inhaled steroid unless you are already taking one.  Nasal irrigation with a saline spray or Netti Pot like device per their directions is also recommended.  If not allergic, please take over the counter Tylenol (Acetaminophen) and/or Motrin (Ibuprofen) as directed for control of pain and/or fever.  Please follow up with your primary care doctor or specialist as needed.    If you  smoke, please stop smoking.    Sinusitis (Antibiotic Treatment)    The sinuses are air-filled spaces within the bones of the face. They connect to the inside of the nose. Sinusitis is an inflammation of the tissue lining the sinus cavity. Sinus inflammation can occur during a cold. It can also be due to  allergies to pollens and other particles in the air. Sinusitis can cause symptoms of sinus congestion and fullness. A sinus infection causes fever, headache and facial pain. There is often green or yellow drainage from the nose or into the back of the throat (post-nasal drip). You have been given antibiotics to treat this condition.  Home care:  · Take the full course of antibiotics as instructed. Do not stop taking them, even if you feel better.  · Drink plenty of water, hot tea, and other liquids. This may help thin mucus. It also may promote sinus drainage.  · Heat may help soothe painful areas of the face. Use a towel soaked in hot water. Or,  the shower and direct the hot spray onto your face. Using a vaporizer along with a menthol rub at night may also help.   · An expectorant containing guaifenesin may help thin the mucus and promote drainage from the sinuses.  · Over-the-counter decongestants may be used unless a similar medicine was prescribed. Nasal sprays work the fastest. Use one that contains phenylephrine or oxymetazoline. First blow the nose gently. Then use the spray. Do not use these medicines more often than directed on the label or symptoms may get worse. You may also use tablets containing pseudoephedrine. Avoid products that combine ingredients, because side effects may be increased. Read labels. You can also ask the pharmacist for help. (NOTE: Persons with high blood pressure should not use decongestants. They can raise blood pressure.)  · Over-the-counter antihistamines may help if allergies contributed to your sinusitis.    · Do not use nasal rinses or irrigation during an acute sinus infection, unless told to by your health care provider. Rinsing may spread the infection to other sinuses.  · Use acetaminophen or ibuprofen to control pain, unless another pain medicine was prescribed. (If you have chronic liver or kidney disease or ever had a stomach ulcer, talk with your doctor before  using these medicines. Aspirin should never be used in anyone under 18 years of age who is ill with a fever. It may cause severe liver damage.)  · Don't smoke. This can worsen symptoms.  Follow-up care  Follow up with your healthcare provider or our staff if you are not improving within the next week.  When to seek medical advice  Call your healthcare provider if any of these occur:  · Facial pain or headache becoming more severe  · Stiff neck  · Unusual drowsiness or confusion  · Swelling of the forehead or eyelids  · Vision problems, including blurred or double vision  · Fever of 100.4ºF (38ºC) or higher, or as directed by your healthcare provider  · Seizure  · Breathing problems  · Symptoms not resolving within 10 days  Date Last Reviewed: 4/13/2015  © 5448-0449 [employer]. [address] [place] [address], [place] [address]. All rights reserved. This information is not intended as a substitute for professional medical care. Always follow your healthcare professional's instructions.

## 2018-11-13 DIAGNOSIS — M54.16 RIGHT LUMBAR RADICULOPATHY: ICD-10-CM

## 2018-11-13 RX ORDER — GABAPENTIN 600 MG/1
TABLET ORAL
Qty: 120 TABLET | Refills: 1 | Status: SHIPPED | OUTPATIENT
Start: 2018-11-13 | End: 2019-01-14 | Stop reason: SDUPTHER

## 2018-12-11 DIAGNOSIS — G89.29 CHRONIC LOW BACK PAIN: ICD-10-CM

## 2018-12-11 DIAGNOSIS — M54.50 CHRONIC LOW BACK PAIN: ICD-10-CM

## 2018-12-11 DIAGNOSIS — M77.12 LATERAL EPICONDYLITIS OF LEFT ELBOW: ICD-10-CM

## 2018-12-11 RX ORDER — ALPRAZOLAM 0.5 MG/1
TABLET ORAL
Qty: 30 TABLET | Refills: 2 | Status: SHIPPED | OUTPATIENT
Start: 2018-12-11 | End: 2020-12-01 | Stop reason: SDUPTHER

## 2018-12-11 RX ORDER — DICLOFENAC SODIUM 10 MG/G
GEL TOPICAL
Qty: 300 G | Refills: 2 | Status: SHIPPED | OUTPATIENT
Start: 2018-12-11 | End: 2022-01-06 | Stop reason: SDUPTHER

## 2018-12-13 ENCOUNTER — TELEPHONE (OUTPATIENT)
Dept: FAMILY MEDICINE | Facility: CLINIC | Age: 53
End: 2018-12-13

## 2018-12-13 NOTE — TELEPHONE ENCOUNTER
----- Message from Teagan Snider sent at 12/13/2018  3:42 PM CST -----  Contact: self  Pt is calling to have the Xanax script sent into the pharmacy instead of being picked up. Please call pt at [phone].

## 2018-12-14 ENCOUNTER — TELEPHONE (OUTPATIENT)
Dept: FAMILY MEDICINE | Facility: CLINIC | Age: 53
End: 2018-12-14

## 2018-12-14 NOTE — TELEPHONE ENCOUNTER
----- Message from Teagan Snider sent at 12/13/2018  3:42 PM CST -----  Contact: self  Pt is calling to have the Xanax script sent into the pharmacy instead of being picked up. Please call pt at [phone].

## 2019-01-04 ENCOUNTER — TELEPHONE (OUTPATIENT)
Dept: FAMILY MEDICINE | Facility: CLINIC | Age: 54
End: 2019-01-04

## 2019-01-04 DIAGNOSIS — Z12.31 BREAST CANCER SCREENING BY MAMMOGRAM: Primary | ICD-10-CM

## 2019-01-04 NOTE — TELEPHONE ENCOUNTER
----- Message from Renetta Cummings sent at 1/4/2019  1:22 PM CST -----  Contact: Self  Pt is asking for a Mammo order. [phone].

## 2019-01-07 ENCOUNTER — TELEPHONE (OUTPATIENT)
Dept: FAMILY MEDICINE | Facility: CLINIC | Age: 54
End: 2019-01-07

## 2019-01-07 RX ORDER — PROPRANOLOL HYDROCHLORIDE 20 MG/1
20 TABLET ORAL 3 TIMES DAILY
Qty: 270 TABLET | Refills: 1 | Status: SHIPPED | OUTPATIENT
Start: 2019-01-07 | End: 2019-07-23 | Stop reason: SDUPTHER

## 2019-01-07 NOTE — TELEPHONE ENCOUNTER
----- Message from Carmella Nowak sent at 1/7/2019 12:35 PM CST -----  Contact: self [phone]  Pt is requesting change on her BP meds bc her insurance company is only covering propranolol timolol tabs.  .  [facility] [place] - [id number]  [id number]  [place] [id number]  Phone: [phone] Fax: [phone]

## 2019-01-07 NOTE — TELEPHONE ENCOUNTER
Please notify the patient that changing her medication she will have to take the medication now 3 times a day.  I have sent the new medicaiton.

## 2019-01-11 DIAGNOSIS — Z12.11 COLON CANCER SCREENING: ICD-10-CM

## 2019-01-14 DIAGNOSIS — M54.16 RIGHT LUMBAR RADICULOPATHY: ICD-10-CM

## 2019-01-14 RX ORDER — GABAPENTIN 600 MG/1
TABLET ORAL
Qty: 120 TABLET | Refills: 1 | Status: SHIPPED | OUTPATIENT
Start: 2019-01-14 | End: 2019-03-04 | Stop reason: SDUPTHER

## 2019-01-17 ENCOUNTER — TELEPHONE (OUTPATIENT)
Dept: FAMILY MEDICINE | Facility: CLINIC | Age: 54
End: 2019-01-17

## 2019-01-17 NOTE — TELEPHONE ENCOUNTER
----- Message from Carmella Nowak sent at 1/16/2019  4:42 PM CST -----  Contact: self [phone]  Pt returned a missed call to staff

## 2019-01-22 ENCOUNTER — TELEPHONE (OUTPATIENT)
Dept: FAMILY MEDICINE | Facility: CLINIC | Age: 54
End: 2019-01-22

## 2019-01-22 NOTE — TELEPHONE ENCOUNTER
----- Message from Skinny Elise sent at 1/21/2019  3:38 PM CST -----  Contact: Self/[phone]  The patient returned the staff call.        Thank you

## 2019-02-04 ENCOUNTER — HOSPITAL ENCOUNTER (OUTPATIENT)
Dept: RADIOLOGY | Facility: HOSPITAL | Age: 54
Discharge: HOME | End: 2019-02-04
Attending: FAMILY MEDICINE
Payer: COMMERCIAL

## 2019-02-04 DIAGNOSIS — Z12.31 BREAST CANCER SCREENING BY MAMMOGRAM: ICD-10-CM

## 2019-02-04 PROCEDURE — 77067 SCR MAMMO BI INCL CAD: CPT | Mod: 26,,, | Performed by: RADIOLOGY

## 2019-02-04 PROCEDURE — 77067 MAMMO DIGITAL SCREENING BILAT WITH TOMOSYNTHESIS_CAD: ICD-10-PCS | Mod: 26,,, | Performed by: RADIOLOGY

## 2019-02-04 PROCEDURE — 77063 MAMMO DIGITAL SCREENING BILAT WITH TOMOSYNTHESIS_CAD: ICD-10-PCS | Mod: 26,,, | Performed by: RADIOLOGY

## 2019-02-04 PROCEDURE — 77067 SCR MAMMO BI INCL CAD: CPT | Mod: TC

## 2019-02-04 PROCEDURE — 77063 BREAST TOMOSYNTHESIS BI: CPT | Mod: 26,,, | Performed by: RADIOLOGY

## 2019-02-18 DIAGNOSIS — K21.9 GASTROESOPHAGEAL REFLUX DISEASE, ESOPHAGITIS PRESENCE NOT SPECIFIED: ICD-10-CM

## 2019-02-18 RX ORDER — OMEPRAZOLE 40 MG/1
CAPSULE, DELAYED RELEASE ORAL
Qty: 90 CAPSULE | Refills: 2 | Status: SHIPPED | OUTPATIENT
Start: 2019-02-18 | End: 2019-11-11 | Stop reason: SDUPTHER

## 2019-03-04 ENCOUNTER — OFFICE VISIT (OUTPATIENT)
Dept: URGENT CARE | Facility: CLINIC | Age: 54
End: 2019-03-04
Payer: COMMERCIAL

## 2019-03-04 VITALS
SYSTOLIC BLOOD PRESSURE: 141 MMHG | OXYGEN SATURATION: 98 % | DIASTOLIC BLOOD PRESSURE: 93 MMHG | HEART RATE: 84 BPM | TEMPERATURE: 98 F | WEIGHT: 173 LBS | BODY MASS INDEX: 29.7 KG/M2

## 2019-03-04 DIAGNOSIS — M54.16 RIGHT LUMBAR RADICULOPATHY: ICD-10-CM

## 2019-03-04 DIAGNOSIS — R07.81 RIB PAIN ON LEFT SIDE: ICD-10-CM

## 2019-03-04 DIAGNOSIS — S20.212A CONTUSION OF RIBS, LEFT, INITIAL ENCOUNTER: Primary | ICD-10-CM

## 2019-03-04 PROCEDURE — 99214 OFFICE O/P EST MOD 30 MIN: CPT | Mod: S$GLB,,, | Performed by: PHYSICIAN ASSISTANT

## 2019-03-04 PROCEDURE — 71100 XR RIBS 2 VIEW LEFT: ICD-10-PCS | Mod: LT,S$GLB,, | Performed by: RADIOLOGY

## 2019-03-04 PROCEDURE — 99214 PR OFFICE/OUTPT VISIT, EST, LEVL IV, 30-39 MIN: ICD-10-PCS | Mod: S$GLB,,, | Performed by: PHYSICIAN ASSISTANT

## 2019-03-04 PROCEDURE — 71100 X-RAY EXAM RIBS UNI 2 VIEWS: CPT | Mod: LT,S$GLB,, | Performed by: RADIOLOGY

## 2019-03-04 RX ORDER — GABAPENTIN 600 MG/1
TABLET ORAL
Qty: 120 TABLET | Refills: 1 | Status: SHIPPED | OUTPATIENT
Start: 2019-03-04 | End: 2019-05-13 | Stop reason: SDUPTHER
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
## 2019-03-04 NOTE — PATIENT INSTRUCTIONS
Rib Contusion     A rib contusion is a bruise to one or more rib bones. It may cause pain, tenderness, swelling and a purplish discoloration. There may be a sharp pain while breathing.  You will be assessed for other injuries. You will likely be given pain medicine. Rib contusions heal on their own, without further treatment. However, pain may take weeks to months to go away.   Note that a small crack (fracture) in the rib may cause the same symptoms as a rib contusion. The small crack may not be seen on a chest X-ray. However, the conditions are managed in the same way.  Home care  · Rest. Avoid heavy lifting, strenuous exertion, or any activity that causes pain.  · Ice the area to reduce pain and swelling. Put ice cubes in a plastic bag or use a cold pack. (Wrap the cold source in a thin towel. Do not place it directly on your skin.) Ice the injured area for 20 minutes every 1 to 2 hours the first day. Continue with ice packs 3 to 4 times a day for the next 2 days, then as needed for the relief of pain and swelling.  · Take any prescribed pain medicine as directed by your healthcare provider. If none was prescribed, take acetaminophen, ibuprofen, or naproxen to control pain.  · If you have a significant injury, you may be given a device called an incentive spirometer to keep your lungs healthy. Use as directed.  Follow-up care  Follow up with your healthcare provider during the next week or as directed.  When to seek medical advice  Call your healthcare provider for any of the following:  · Shortness of breath or trouble breathing  · Increasing chest pain with breathing  · Coughing  · Dizziness, weakness, or fainting  · New or worsening pain  · Fever of 100.4°F (38ºC) or higher, or as directed by your healthcare provider  Date Last Reviewed: 2/1/2017  © 2880-9705 [employer]. [address] [place] [address], [place] [address]. All rights reserved. This information is not intended as a substitute for  professional medical care. Always follow your healthcare professional's instructions.

## 2019-04-03 LAB
HPV, HIGH-RISK: NOT DETECTED
PAP SMEAR: NORMAL

## 2019-05-13 DIAGNOSIS — M54.16 RIGHT LUMBAR RADICULOPATHY: ICD-10-CM

## 2019-05-13 RX ORDER — GABAPENTIN 600 MG/1
TABLET ORAL
Qty: 120 TABLET | Refills: 1 | Status: SHIPPED | OUTPATIENT
Start: 2019-05-13 | End: 2019-09-23 | Stop reason: SDUPTHER

## 2019-07-24 RX ORDER — PROPRANOLOL HYDROCHLORIDE 20 MG/1
TABLET ORAL
Qty: 90 TABLET | Refills: 0 | Status: SHIPPED | OUTPATIENT
Start: 2019-07-24 | End: 2019-08-22 | Stop reason: SDUPTHER

## 2019-08-22 RX ORDER — PROPRANOLOL HYDROCHLORIDE 20 MG/1
TABLET ORAL
Qty: 90 TABLET | Refills: 0 | Status: SHIPPED | OUTPATIENT
Start: 2019-08-22 | End: 2019-08-28 | Stop reason: SDUPTHER

## 2019-08-28 ENCOUNTER — OFFICE VISIT (OUTPATIENT)
Dept: FAMILY MEDICINE | Facility: CLINIC | Age: 54
End: 2019-08-28
Payer: COMMERCIAL

## 2019-08-28 VITALS
HEIGHT: 64 IN | OXYGEN SATURATION: 97 % | BODY MASS INDEX: 29.17 KG/M2 | DIASTOLIC BLOOD PRESSURE: 80 MMHG | HEART RATE: 68 BPM | SYSTOLIC BLOOD PRESSURE: 114 MMHG | WEIGHT: 170.88 LBS | TEMPERATURE: 99 F

## 2019-08-28 DIAGNOSIS — R07.89 OTHER CHEST PAIN: Primary | ICD-10-CM

## 2019-08-28 DIAGNOSIS — I10 ESSENTIAL HYPERTENSION: ICD-10-CM

## 2019-08-28 DIAGNOSIS — M79.10 MYALGIA: ICD-10-CM

## 2019-08-28 DIAGNOSIS — F43.0 ACUTE STRESS DISORDER: ICD-10-CM

## 2019-08-28 DIAGNOSIS — R10.11 RIGHT UPPER QUADRANT ABDOMINAL PAIN: ICD-10-CM

## 2019-08-28 PROCEDURE — 93005 ELECTROCARDIOGRAM TRACING: CPT | Mod: S$GLB,,, | Performed by: FAMILY MEDICINE

## 2019-08-28 PROCEDURE — 93010 ELECTROCARDIOGRAM REPORT: CPT | Mod: S$GLB,,, | Performed by: INTERNAL MEDICINE

## 2019-08-28 PROCEDURE — 99214 PR OFFICE/OUTPT VISIT, EST, LEVL IV, 30-39 MIN: ICD-10-PCS | Mod: S$GLB,,, | Performed by: FAMILY MEDICINE

## 2019-08-28 PROCEDURE — 99999 PR PBB SHADOW E&M-EST. PATIENT-LVL III: ICD-10-PCS | Mod: PBBFAC,,, | Performed by: FAMILY MEDICINE

## 2019-08-28 PROCEDURE — 99999 PR PBB SHADOW E&M-EST. PATIENT-LVL III: CPT | Mod: PBBFAC,,, | Performed by: FAMILY MEDICINE

## 2019-08-28 PROCEDURE — 93010 EKG 12-LEAD: ICD-10-PCS | Mod: S$GLB,,, | Performed by: INTERNAL MEDICINE

## 2019-08-28 PROCEDURE — 99214 OFFICE O/P EST MOD 30 MIN: CPT | Mod: S$GLB,,, | Performed by: FAMILY MEDICINE

## 2019-08-28 PROCEDURE — 93005 EKG 12-LEAD: ICD-10-PCS | Mod: S$GLB,,, | Performed by: FAMILY MEDICINE

## 2019-08-28 RX ORDER — ATENOLOL 100 MG/1
100 TABLET ORAL DAILY
Qty: 90 TABLET | Refills: 3 | Status: SHIPPED | OUTPATIENT
Start: 2019-08-28 | End: 2020-07-15

## 2019-08-28 RX ORDER — PROPRANOLOL HYDROCHLORIDE 20 MG/1
20 TABLET ORAL 3 TIMES DAILY
Qty: 90 TABLET | Refills: 0 | Status: SHIPPED | OUTPATIENT
Start: 2019-08-28 | End: 2022-08-12

## 2019-08-28 RX ORDER — FLUOXETINE 10 MG/1
10 CAPSULE ORAL DAILY
Qty: 90 CAPSULE | Refills: 3 | Status: SHIPPED | OUTPATIENT
Start: 2019-08-28 | End: 2020-11-05

## 2019-08-28 NOTE — PROGRESS NOTES
Assessment & Plan  Problem List Items Addressed This Visit        Cardiac/Vascular    Essential hypertension    Relevant Medications    atenolol (TENORMIN) 100 MG tablet      Other Visit Diagnoses     Other chest pain    -  Primary    Relevant Orders    EKG 12-lead    Myalgia        Right upper quadrant abdominal pain        Acute stress disorder        Relevant Medications    FLUoxetine 10 MG capsule        Patient does have continued chest pain. She also has abdominal discomfort.  At this time I likely believed that the patient's symptomatology is related to increased stress.  I did evaluate the patient's EKG in the office.  Patient does have normal sinus rhythm.  Questionable left atrial enlargement but unlikely in this patient.      Health Maintenance reviewed.    Follow-up: Follow up in about 6 weeks (around 10/9/2019).    ______________________________________________________________________    Chief Complaint  Chief Complaint   Patient presents with    right side pain    Medication Refill    Hot Flashes       HPI  Brianna Coughlin is a 54 y.o. female with multiple medical diagnoses as listed in the medical history and problem list that presents for right side pain.  Pt is known to me with last appointment 6/8/2018.    Right side pain:  History of cholecystectomy in her 20s.  She has noticed increased aching in her shoulders and joints.  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  She has noticed increased pain.  She will have it more on the left side.  She states that it can radiate toward the back.  She had a history colitis.  She did notice a flushed episode that occur at 9am in the morning.  She reports adverse side effects with cymbalta.      PAST MEDICAL HISTORY:  Past Medical History:   Diagnosis Date    Abnormal Pap smear     Anxiety     Breast cyst     Dysplasia of cervix     Fibroids     GERD (gastroesophageal reflux disease)     Hypertension     Ovarian cyst        PAST  SURGICAL HISTORY:  Past Surgical History:   Procedure Laterality Date    CHOLECYSTECTOMY      conization of cervix      TUBAL LIGATION         SOCIAL HISTORY:  Social History     Socioeconomic History    Marital status: Single     Spouse name: Not on file    Number of children: Not on file    Years of education: Not on file    Highest education level: Not on file   Occupational History    Not on file   Social Needs    Financial resource strain: Not on file    Food insecurity:     Worry: Not on file     Inability: Not on file    Transportation needs:     Medical: Not on file     Non-medical: Not on file   Tobacco Use    Smoking status: Never Smoker    Smokeless tobacco: Never Used   Substance and Sexual Activity    Alcohol use: Yes     Alcohol/week: 0.0 oz     Comment: socially    Drug use: No    Sexual activity: Not Currently     Partners: Male   Lifestyle    Physical activity:     Days per week: Not on file     Minutes per session: Not on file    Stress: Not on file   Relationships    Social connections:     Talks on phone: Not on file     Gets together: Not on file     Attends [employer]al service: Not on file     Active member of club or organization: Not on file     Attends meetings of clubs or organizations: Not on file     Relationship status: Not on file   Other Topics Concern    Not on file   Social History Narrative    Not on file       FAMILY HISTORY:  Family History   Problem Relation Age of Onset    Hypertension Mother     Heart disease Mother     Heart disease Father     Stroke Maternal Grandfather        ALLERGIES AND MEDICATIONS: updated and reviewed.  Review of patient's allergies indicates:   Allergen Reactions    Acetaminophen-codeine Other (See Comments)     Stomach pains    Mobic [meloxicam] Other (See Comments)     STOMACH ULCERS    Iodine Other (See Comments)    Penicillins Nausea And Vomiting    Sulfa (sulfonamide antibiotics) Nausea And Vomiting     Other  reaction(s): Rash    Unable to assess      MEDICINES CONTAINING ASPIRIN AFFECT HER STOMACH/STOMACH ULCER     Current Outpatient Medications   Medication Sig Dispense Refill    ALPRAZolam (XANAX) 0.5 MG tablet TAKE ONE TABLET BY MOUTH AS DIRECTED AS NEEDED 30 tablet 2    co-enzyme Q-10 30 mg capsule Take 30 mg by mouth 3 (three) times daily.      diclofenac sodium (VOLTAREN) 1 % Gel APPLY TWO GRAMS TOPICALLY FOUR TIMES DAILY 300 g 2    gabapentin (NEURONTIN) 600 MG tablet TAKE ONE TABLET BY MOUTH FOUR TIMES DAILY 120 tablet 1    omeprazole (PRILOSEC) 40 MG capsule TAKE ONE CAPSULE BY MOUTH once DAILY 90 capsule 2    propranolol (INDERAL) 20 MG tablet Take 1 tablet (20 mg total) by mouth 3 (three) times daily. 90 tablet 0    soy isofla/blk cohosh/mag bark (ESTROVEN ORAL) Take by mouth.      atenolol (TENORMIN) 100 MG tablet Take 1 tablet (100 mg total) by mouth once daily. 90 tablet 3    FLUoxetine 10 MG capsule Take 1 capsule (10 mg total) by mouth once daily. 90 capsule 3    multivitamin (ONE DAILY MULTIVITAMIN) per tablet Take 1 tablet by mouth once daily.       No current facility-administered medications for this visit.          ROS  Review of Systems   Constitutional: Positive for activity change and fatigue. Negative for appetite change, fever and unexpected weight change.   HENT: Negative.  Negative for ear discharge, ear pain, rhinorrhea and sore throat.    Eyes: Negative.    Respiratory: Negative for apnea, cough, chest tightness, shortness of breath and wheezing.    Cardiovascular: Negative for chest pain, palpitations and leg swelling.   Gastrointestinal: Negative for abdominal distention, abdominal pain, constipation, diarrhea and vomiting.   Endocrine: Negative for cold intolerance, heat intolerance, polydipsia and polyuria.   Genitourinary: Negative for decreased urine volume and urgency.   Musculoskeletal: Positive for arthralgias, joint swelling and myalgias.   Skin: Negative for rash.  "  Neurological: Positive for weakness. Negative for dizziness and headaches.   Hematological: Does not bruise/bleed easily.   Psychiatric/Behavioral: Negative for agitation, sleep disturbance and suicidal ideas.           Physical Exam  Vitals:    08/28/19 1347   BP: 114/80   BP Location: Left arm   Patient Position: Sitting   BP Method: Small (Manual)   Pulse: 68   Temp: 98.5 °F (36.9 °C)   TempSrc: Oral   SpO2: 97%   Weight: 77.5 kg (170 lb 13.7 oz)   Height: 5' 4" (1.626 m)    Body mass index is 29.33 kg/m².  Weight: 77.5 kg (170 lb 13.7 oz)   Height: 5' 4" (162.6 cm)   Physical Exam   Constitutional: She is oriented to person, place, and time. She appears well-developed and well-nourished.   HENT:   Head: Normocephalic and atraumatic.   Right Ear: External ear normal.   Left Ear: External ear normal.   Nose: Nose normal.   Mouth/Throat: Oropharynx is clear and moist.   Eyes: Pupils are equal, round, and reactive to light. Conjunctivae and EOM are normal.   Cardiovascular: Normal rate, regular rhythm and normal heart sounds.   Pulmonary/Chest: Effort normal and breath sounds normal.   Neurological: She is alert and oriented to person, place, and time.   Skin: Skin is warm and dry.   Vitals reviewed.      Health Maintenance       Date Due Completion Date    Hepatitis C Screening 1965 ---    TETANUS VACCINE 05/06/1983 ---    Shingles Vaccine (1 of 2) 05/06/2015 ---    Colonoscopy 05/06/2015 ---    Influenza Vaccine (1) 09/01/2019 ---    Mammogram 02/04/2021 2/4/2019    Pap Smear with HPV Cotest 03/07/2021 3/7/2018    Lipid Panel 06/08/2023 6/8/2018              Patient note was created using [employer].  Any errors in syntax or even information may not have been identified and edited on initial review prior to signing this note.  "

## 2019-08-29 ENCOUNTER — TELEPHONE (OUTPATIENT)
Dept: FAMILY MEDICINE | Facility: CLINIC | Age: 54
End: 2019-08-29

## 2019-08-29 NOTE — TELEPHONE ENCOUNTER
----- Message from Rocio Jha sent at 8/29/2019  1:35 PM CDT -----  Contact: SELF  Type: Patient Call Back    Who called:self    What is the request in detail:Pt want's to make sure she understood what medication she should be taking.    Can the clinic reply by [facility]?no    Would the patient rather a call back or a response via [facility]?call    Best call back number:[phone]

## 2019-08-29 NOTE — TELEPHONE ENCOUNTER
Spoke with patient and she stated that she was calling to see if you wanted her to take the new medication PROPRANOLOL and stop the ATENOLOL.  Please advise.

## 2019-08-30 DIAGNOSIS — Z11.59 NEED FOR HEPATITIS C SCREENING TEST: ICD-10-CM

## 2019-09-23 DIAGNOSIS — M54.16 RIGHT LUMBAR RADICULOPATHY: ICD-10-CM

## 2019-09-23 RX ORDER — GABAPENTIN 600 MG/1
TABLET ORAL
Qty: 120 TABLET | Refills: 1 | Status: SHIPPED | OUTPATIENT
Start: 2019-09-23 | End: 2019-11-20 | Stop reason: SDUPTHER

## 2019-10-09 ENCOUNTER — OFFICE VISIT (OUTPATIENT)
Dept: FAMILY MEDICINE | Facility: CLINIC | Age: 54
End: 2019-10-09
Payer: COMMERCIAL

## 2019-10-09 VITALS
TEMPERATURE: 98 F | DIASTOLIC BLOOD PRESSURE: 78 MMHG | WEIGHT: 172.63 LBS | HEIGHT: 64 IN | SYSTOLIC BLOOD PRESSURE: 100 MMHG | BODY MASS INDEX: 29.47 KG/M2 | OXYGEN SATURATION: 97 % | HEART RATE: 54 BPM

## 2019-10-09 DIAGNOSIS — M79.7 FIBROMYALGIA: ICD-10-CM

## 2019-10-09 DIAGNOSIS — I10 ESSENTIAL HYPERTENSION: Primary | ICD-10-CM

## 2019-10-09 DIAGNOSIS — Z23 NEED FOR SHINGLES VACCINE: ICD-10-CM

## 2019-10-09 PROCEDURE — 90471 ZOSTER RECOMBINANT VACCINE: ICD-10-PCS | Mod: S$GLB,,, | Performed by: FAMILY MEDICINE

## 2019-10-09 PROCEDURE — 90750 HZV VACC RECOMBINANT IM: CPT | Mod: S$GLB,,, | Performed by: FAMILY MEDICINE

## 2019-10-09 PROCEDURE — 99214 PR OFFICE/OUTPT VISIT, EST, LEVL IV, 30-39 MIN: ICD-10-PCS | Mod: 25,S$GLB,, | Performed by: FAMILY MEDICINE

## 2019-10-09 PROCEDURE — 99214 OFFICE O/P EST MOD 30 MIN: CPT | Mod: 25,S$GLB,, | Performed by: FAMILY MEDICINE

## 2019-10-09 PROCEDURE — 90750 ZOSTER RECOMBINANT VACCINE: ICD-10-PCS | Mod: S$GLB,,, | Performed by: FAMILY MEDICINE

## 2019-10-09 PROCEDURE — 90471 IMMUNIZATION ADMIN: CPT | Mod: S$GLB,,, | Performed by: FAMILY MEDICINE

## 2019-10-09 PROCEDURE — 99999 PR PBB SHADOW E&M-EST. PATIENT-LVL III: CPT | Mod: PBBFAC,,, | Performed by: FAMILY MEDICINE

## 2019-10-09 PROCEDURE — 99999 PR PBB SHADOW E&M-EST. PATIENT-LVL III: ICD-10-PCS | Mod: PBBFAC,,, | Performed by: FAMILY MEDICINE

## 2019-10-09 NOTE — ASSESSMENT & PLAN NOTE
Patient is stable.  Assess and addressed all modifiable risk factors.  Continue with appropriate management to prevent complications.  Improved blood pressure control

## 2019-10-09 NOTE — PROGRESS NOTES
Assessment & Plan  Problem List Items Addressed This Visit        Cardiac/Vascular    Essential hypertension - Primary    Current Assessment & Plan     Patient is stable.  Assess and addressed all modifiable risk factors.  Continue with appropriate management to prevent complications.  Improved blood pressure control             Orthopedic    Fibromyalgia      Other Visit Diagnoses     Need for shingles vaccine        Relevant Orders    (In Office Administered) Zoster Recombinant Vaccine (Completed)        Prozac is helping with current symptoms at this time.  Patient states that pain is more tolerable.  Will continue with Prozac at this time in order to address fibromyalgia like pain.    Health Maintenance reviewed.    Follow-up: Follow up in about 6 months (around 4/9/2020).    ______________________________________________________________________    Chief Complaint  Chief Complaint   Patient presents with    Hypertension    pain on right side    Follow-up       HPI  Brianna Coughlin is a 54 y.o. female with multiple medical diagnoses as listed in the medical history and problem list that presents for hypertension.  Pt is known to me with last appointment 8/28/2019.    Patient denies any new symptoms including chest pain, SOB, blurry vision, N/V, diarrhea.  Hypertension: The patient reports that they check their blood pressures regularly and blood pressure is generally well controlled (<= 139/89).  The patient  is not enrolled in the digital hypertension program. The patient denies  cardiac chest pain, shortness of breath, lower extremity edema, headaches and side effects of medication. The patient reports problems with  none. The patient  has been compliant with the current medication regimen.  The patient : tries to follow a low salt diet.  Patient reports that her mood has improved.  Patient also indicates that the pain has improved with the addition of Prozac.  Did discuss the duration of therapy in  the office.  We did discuss weaning protocol for this medication as well.      PAST MEDICAL HISTORY:  Past Medical History:   Diagnosis Date    Abnormal Pap smear     Anxiety     Breast cyst     Dysplasia of cervix     Fibroids     GERD (gastroesophageal reflux disease)     Hypertension     Ovarian cyst        PAST SURGICAL HISTORY:  Past Surgical History:   Procedure Laterality Date    CHOLECYSTECTOMY      conization of cervix      TUBAL LIGATION         SOCIAL HISTORY:  Social History     Socioeconomic History    Marital status: Single     Spouse name: Not on file    Number of children: Not on file    Years of education: Not on file    Highest education level: Not on file   Occupational History    Not on file   Social Needs    Financial resource strain: Not on file    Food insecurity:     Worry: Not on file     Inability: Not on file    Transportation needs:     Medical: Not on file     Non-medical: Not on file   Tobacco Use    Smoking status: Never Smoker    Smokeless tobacco: Never Used   Substance and Sexual Activity    Alcohol use: Yes     Alcohol/week: 0.0 standard drinks     Comment: socially    Drug use: No    Sexual activity: Not Currently     Partners: Male   Lifestyle    Physical activity:     Days per week: Not on file     Minutes per session: Not on file    Stress: Not on file   Relationships    Social connections:     Talks on phone: Not on file     Gets together: Not on file     Attends [employer] service: Not on file     Active member of club or organization: Not on file     Attends meetings of clubs or organizations: Not on file     Relationship status: Not on file   Other Topics Concern    Not on file   Social History Narrative    Not on file       FAMILY HISTORY:  Family History   Problem Relation Age of Onset    Hypertension Mother     Heart disease Mother     Heart disease Father     Stroke Maternal Grandfather        ALLERGIES AND MEDICATIONS: updated and  reviewed.  Review of patient's allergies indicates:   Allergen Reactions    Acetaminophen-codeine Other (See Comments)     Stomach pains    Iodinated contrast media Anaphylaxis    Mobic [meloxicam] Other (See Comments)     STOMACH ULCERS    Iodine Other (See Comments)    Penicillins Nausea And Vomiting    Sulfa (sulfonamide antibiotics) Nausea And Vomiting     Other reaction(s): Rash    Unable to assess      MEDICINES CONTAINING ASPIRIN AFFECT HER STOMACH/STOMACH ULCER     Current Outpatient Medications   Medication Sig Dispense Refill    ALPRAZolam (XANAX) 0.5 MG tablet TAKE ONE TABLET BY MOUTH AS DIRECTED AS NEEDED 30 tablet 2    atenolol (TENORMIN) 100 MG tablet Take 1 tablet (100 mg total) by mouth once daily. 90 tablet 3    co-enzyme Q-10 30 mg capsule Take 30 mg by mouth 3 (three) times daily.      diclofenac sodium (VOLTAREN) 1 % Gel APPLY TWO GRAMS TOPICALLY FOUR TIMES DAILY 300 g 2    FLUoxetine 10 MG capsule Take 1 capsule (10 mg total) by mouth once daily. 90 capsule 3    gabapentin (NEURONTIN) 600 MG tablet TAKE ONE TABLET BY MOUTH FOUR TIMES DAILY 120 tablet 1    multivitamin (ONE DAILY MULTIVITAMIN) per tablet Take 1 tablet by mouth once daily.      omeprazole (PRILOSEC) 40 MG capsule TAKE ONE CAPSULE BY MOUTH once DAILY 90 capsule 2    propranolol (INDERAL) 20 MG tablet Take 1 tablet (20 mg total) by mouth 3 (three) times daily. 90 tablet 0    soy isofla/blk cohosh/mag bark (ESTROVEN ORAL) Take by mouth.       No current facility-administered medications for this visit.          ROS  Review of Systems   Constitutional: Negative for activity change, appetite change, fatigue, fever and unexpected weight change.   HENT: Negative.  Negative for ear discharge, ear pain, rhinorrhea and sore throat.    Eyes: Negative.    Respiratory: Negative for apnea, cough, chest tightness, shortness of breath and wheezing.    Cardiovascular: Negative for chest pain, palpitations and leg swelling.  "  Gastrointestinal: Negative for abdominal distention, abdominal pain, constipation, diarrhea and vomiting.   Endocrine: Negative for cold intolerance, heat intolerance, polydipsia and polyuria.   Genitourinary: Negative for decreased urine volume and urgency.   Musculoskeletal: Negative.    Skin: Negative for rash.   Neurological: Negative for dizziness and headaches.   Hematological: Does not bruise/bleed easily.   Psychiatric/Behavioral: Negative for agitation, sleep disturbance and suicidal ideas.           Physical Exam  Vitals:    10/09/19 1330   BP: 100/78   BP Location: Left arm   Patient Position: Sitting   BP Method: Large (Manual)   Pulse: (!) 54   Temp: 98.1 °F (36.7 °C)   TempSrc: Oral   SpO2: 97%   Weight: 78.3 kg (172 lb 9.9 oz)   Height: 5' 4" (1.626 m)    Body mass index is 29.63 kg/m².  Weight: 78.3 kg (172 lb 9.9 oz)   Height: 5' 4" (162.6 cm)   Physical Exam   Constitutional: She is oriented to person, place, and time. She appears well-developed and well-nourished.   HENT:   Head: Normocephalic and atraumatic.   Right Ear: External ear normal.   Left Ear: External ear normal.   Nose: Nose normal.   Mouth/Throat: Oropharynx is clear and moist.   Eyes: Pupils are equal, round, and reactive to light. Conjunctivae and EOM are normal.   Cardiovascular: Normal rate, regular rhythm and normal heart sounds.   Pulmonary/Chest: Effort normal and breath sounds normal.   Neurological: She is alert and oriented to person, place, and time.   Skin: Skin is warm and dry.   Vitals reviewed.        Health Maintenance       Date Due Completion Date    Hepatitis C Screening 1965 ---    TETANUS VACCINE 05/06/1983 ---    Shingles Vaccine (1 of 2) 05/06/2015 ---    Mammogram 02/04/2021 2/4/2019    Pap Smear with HPV Cotest 03/07/2021 3/7/2018    Lipid Panel 06/08/2023 6/8/2018    Colonoscopy 07/12/2023 7/12/2013 (Done)    Override on 7/12/2013: Done              Patient note was created using [employer].  Any errors " in syntax or even information may not have been identified and edited on initial review prior to signing this note.

## 2019-11-07 ENCOUNTER — TELEPHONE (OUTPATIENT)
Dept: FAMILY MEDICINE | Facility: CLINIC | Age: 54
End: 2019-11-07

## 2019-11-07 NOTE — TELEPHONE ENCOUNTER
----- Message from Rocio Jha sent at 11/7/2019 11:29 AM CST -----  Contact: self  Type: Patient Call Back    Who called:self    What is the request in detail:pt would like to speak to nurse    Can the clinic reply by [facility]?no    Would the patient rather a call back or a response via [facility]?call    Best call back number:

## 2019-11-07 NOTE — TELEPHONE ENCOUNTER
Spoke to patient and she explained the she do not take the tablet she take the capsules. Patient stated what she she do with the capsules. Please advise

## 2019-11-07 NOTE — TELEPHONE ENCOUNTER
Patient is wanting to know how to slowly get off of the Prozac (paxil), patient stated that she remembers discussing it but not sure what was discussed. Please advise.

## 2019-11-07 NOTE — TELEPHONE ENCOUNTER
1.  Take one capsule every other day for one week.   2.  Take one capsule every 3 days for one week.    3.  Okay to stop after this two week regimen.

## 2019-11-07 NOTE — TELEPHONE ENCOUNTER
1.  She can take 0.5 tablet by mouth every day for 1 week.  2.  Take half a tablet by mouth every other day for 1 week.  3.  Take half a tablet by mouth every 3 days for 1 week.  4.  May discontinue taking medication at this point

## 2019-11-11 DIAGNOSIS — K21.9 GASTROESOPHAGEAL REFLUX DISEASE, ESOPHAGITIS PRESENCE NOT SPECIFIED: ICD-10-CM

## 2019-11-12 RX ORDER — OMEPRAZOLE 40 MG/1
CAPSULE, DELAYED RELEASE ORAL
Qty: 90 CAPSULE | Refills: 2 | Status: SHIPPED | OUTPATIENT
Start: 2019-11-12 | End: 2020-07-17

## 2019-11-20 DIAGNOSIS — M54.16 RIGHT LUMBAR RADICULOPATHY: ICD-10-CM

## 2019-11-20 RX ORDER — GABAPENTIN 600 MG/1
TABLET ORAL
Qty: 120 TABLET | Refills: 1 | Status: SHIPPED | OUTPATIENT
Start: 2019-11-20 | End: 2020-02-26 | Stop reason: SDUPTHER

## 2019-12-10 ENCOUNTER — CLINICAL SUPPORT (OUTPATIENT)
Dept: FAMILY MEDICINE | Facility: CLINIC | Age: 54
End: 2019-12-10
Payer: COMMERCIAL

## 2019-12-10 DIAGNOSIS — Z23 NEED FOR ZOSTER VACCINATION: Primary | ICD-10-CM

## 2019-12-10 PROCEDURE — 90750 ZOSTER RECOMBINANT VACCINE: ICD-10-PCS | Mod: S$GLB,,, | Performed by: FAMILY MEDICINE

## 2019-12-10 PROCEDURE — 99499 NO LOS: ICD-10-PCS | Mod: S$GLB,,, | Performed by: FAMILY MEDICINE

## 2019-12-10 PROCEDURE — 90471 ZOSTER RECOMBINANT VACCINE: ICD-10-PCS | Mod: S$GLB,,, | Performed by: FAMILY MEDICINE

## 2019-12-10 PROCEDURE — 99499 UNLISTED E&M SERVICE: CPT | Mod: S$GLB,,, | Performed by: FAMILY MEDICINE

## 2019-12-10 PROCEDURE — 90471 IMMUNIZATION ADMIN: CPT | Mod: S$GLB,,, | Performed by: FAMILY MEDICINE

## 2019-12-10 PROCEDURE — 90750 HZV VACC RECOMBINANT IM: CPT | Mod: S$GLB,,, | Performed by: FAMILY MEDICINE

## 2019-12-10 NOTE — PROGRESS NOTES
Patient tolerated shingrix #2 injection well.  Instructed to wait 15 minutes after injection for monitoring. Verbalized understanding. VIS given

## 2020-01-06 DIAGNOSIS — I10 ESSENTIAL HYPERTENSION: ICD-10-CM

## 2020-02-07 ENCOUNTER — TELEPHONE (OUTPATIENT)
Dept: FAMILY MEDICINE | Facility: CLINIC | Age: 55
End: 2020-02-07

## 2020-02-07 DIAGNOSIS — Z12.31 ENCOUNTER FOR SCREENING MAMMOGRAM FOR BREAST CANCER: Primary | ICD-10-CM

## 2020-02-07 NOTE — TELEPHONE ENCOUNTER
----- Message from Key [email or web address] sent at 2/7/2020  8:52 AM CST -----  Type:  Mammogram    Caller is requesting to schedule their annual mammogram appointment.  Order is not listed in EPIC.  Please enter order and contact patient to schedule.  Name of Caller:  Brianna    Where would they like the mammogram performed? [facility]    Would the patient rather a call back or a response via [facility]? Call back     Best Call Back Number: [phone]    Additional Information:

## 2020-02-24 ENCOUNTER — HOSPITAL ENCOUNTER (OUTPATIENT)
Dept: RADIOLOGY | Facility: HOSPITAL | Age: 55
Discharge: HOME | End: 2020-02-24
Attending: FAMILY MEDICINE
Payer: COMMERCIAL

## 2020-02-24 VITALS — BODY MASS INDEX: 29.37 KG/M2 | HEIGHT: 64 IN | WEIGHT: 172 LBS

## 2020-02-24 DIAGNOSIS — Z12.31 ENCOUNTER FOR SCREENING MAMMOGRAM FOR BREAST CANCER: ICD-10-CM

## 2020-02-24 PROCEDURE — 77067 MAMMO DIGITAL SCREENING BILAT WITH TOMOSYNTHESIS_CAD: ICD-10-PCS | Mod: 26,,, | Performed by: RADIOLOGY

## 2020-02-24 PROCEDURE — 77063 MAMMO DIGITAL SCREENING BILAT WITH TOMOSYNTHESIS_CAD: ICD-10-PCS | Mod: 26,,, | Performed by: RADIOLOGY

## 2020-02-24 PROCEDURE — 77063 BREAST TOMOSYNTHESIS BI: CPT | Mod: 26,,, | Performed by: RADIOLOGY

## 2020-02-24 PROCEDURE — 77067 SCR MAMMO BI INCL CAD: CPT | Mod: 26,,, | Performed by: RADIOLOGY

## 2020-02-24 PROCEDURE — 77067 SCR MAMMO BI INCL CAD: CPT | Mod: TC,PO

## 2020-02-26 DIAGNOSIS — M54.16 RIGHT LUMBAR RADICULOPATHY: ICD-10-CM

## 2020-02-26 RX ORDER — GABAPENTIN 600 MG/1
TABLET ORAL
Qty: 120 TABLET | Refills: 1 | Status: SHIPPED | OUTPATIENT
Start: 2020-02-26 | End: 2020-04-20

## 2020-04-20 DIAGNOSIS — M54.16 RIGHT LUMBAR RADICULOPATHY: ICD-10-CM

## 2020-04-20 RX ORDER — GABAPENTIN 600 MG/1
TABLET ORAL
Qty: 120 TABLET | Refills: 1 | Status: SHIPPED | OUTPATIENT
Start: 2020-04-20 | End: 2020-05-14

## 2020-05-06 ENCOUNTER — TELEPHONE (OUTPATIENT)
Dept: FAMILY MEDICINE | Facility: CLINIC | Age: 55
End: 2020-05-06

## 2020-05-06 DIAGNOSIS — Z23 NEED FOR TDAP VACCINATION: Primary | ICD-10-CM

## 2020-05-06 NOTE — TELEPHONE ENCOUNTER
----- Message from Tab Bowers sent at 5/6/2020 10:57 AM CDT -----  Contact: Patient  Type: Patient Call Back    Who called: Patient    What is the request in detail: Patient states it is time to get a tetanus injection and needs to be schedule. Please advise.    Can the clinic reply by [facility]?No    Would the patient rather a call back or a response via [facility]? Call    Best call back number: [phone] (home)     Additional Information:n/a

## 2020-05-14 DIAGNOSIS — M54.16 RIGHT LUMBAR RADICULOPATHY: ICD-10-CM

## 2020-05-14 RX ORDER — GABAPENTIN 600 MG/1
TABLET ORAL
Qty: 120 TABLET | Refills: 1 | Status: SHIPPED | OUTPATIENT
Start: 2020-05-14 | End: 2020-06-05

## 2020-05-28 ENCOUNTER — LAB VISIT (OUTPATIENT)
Dept: LAB | Facility: HOSPITAL | Age: 55
End: 2020-05-28
Attending: NURSE PRACTITIONER
Payer: COMMERCIAL

## 2020-05-28 ENCOUNTER — OFFICE VISIT (OUTPATIENT)
Dept: FAMILY MEDICINE | Facility: CLINIC | Age: 55
End: 2020-05-28
Payer: COMMERCIAL

## 2020-05-28 VITALS
OXYGEN SATURATION: 97 % | BODY MASS INDEX: 28.96 KG/M2 | TEMPERATURE: 97 F | HEART RATE: 62 BPM | HEIGHT: 64 IN | DIASTOLIC BLOOD PRESSURE: 76 MMHG | WEIGHT: 169.63 LBS | SYSTOLIC BLOOD PRESSURE: 116 MMHG

## 2020-05-28 DIAGNOSIS — G89.29 CHRONIC MIDLINE LOW BACK PAIN WITH RIGHT-SIDED SCIATICA: ICD-10-CM

## 2020-05-28 DIAGNOSIS — G89.29 CHRONIC RUQ PAIN: Primary | ICD-10-CM

## 2020-05-28 DIAGNOSIS — R10.11 CHRONIC RUQ PAIN: ICD-10-CM

## 2020-05-28 DIAGNOSIS — Z23 NEED FOR TDAP VACCINATION: ICD-10-CM

## 2020-05-28 DIAGNOSIS — M54.41 CHRONIC MIDLINE LOW BACK PAIN WITH RIGHT-SIDED SCIATICA: ICD-10-CM

## 2020-05-28 DIAGNOSIS — I10 ESSENTIAL HYPERTENSION: ICD-10-CM

## 2020-05-28 DIAGNOSIS — R10.11 CHRONIC RUQ PAIN: Primary | ICD-10-CM

## 2020-05-28 DIAGNOSIS — M54.16 RIGHT LUMBAR RADICULOPATHY: ICD-10-CM

## 2020-05-28 DIAGNOSIS — G89.29 CHRONIC RUQ PAIN: ICD-10-CM

## 2020-05-28 LAB
ALBUMIN SERPL BCP-MCNC: 3.9 G/DL (ref 3.5–5.2)
ALP SERPL-CCNC: 59 U/L (ref 55–135)
ALT SERPL W/O P-5'-P-CCNC: 12 U/L (ref 10–44)
ANION GAP SERPL CALC-SCNC: 9 MMOL/L (ref 8–16)
AST SERPL-CCNC: 15 U/L (ref 10–40)
BASOPHILS # BLD AUTO: 0.05 K/UL (ref 0–0.2)
BASOPHILS NFR BLD: 0.6 % (ref 0–1.9)
BILIRUB SERPL-MCNC: 0.3 MG/DL (ref 0.1–1)
BUN SERPL-MCNC: 17 MG/DL (ref 6–20)
CALCIUM SERPL-MCNC: 9.7 MG/DL (ref 8.7–10.5)
CHLORIDE SERPL-SCNC: 104 MMOL/L (ref 95–110)
CO2 SERPL-SCNC: 28 MMOL/L (ref 23–29)
CREAT SERPL-MCNC: 1 MG/DL (ref 0.5–1.4)
DIFFERENTIAL METHOD: ABNORMAL
EOSINOPHIL # BLD AUTO: 0.3 K/UL (ref 0–0.5)
EOSINOPHIL NFR BLD: 3.9 % (ref 0–8)
ERYTHROCYTE [DISTWIDTH] IN BLOOD BY AUTOMATED COUNT: 13.2 % (ref 11.5–14.5)
EST. GFR  (AFRICAN AMERICAN): >60 ML/MIN/1.73 M^2
EST. GFR  (NON AFRICAN AMERICAN): >60 ML/MIN/1.73 M^2
GLUCOSE SERPL-MCNC: 106 MG/DL (ref 70–110)
HCT VFR BLD AUTO: 42.9 % (ref 37–48.5)
HGB BLD-MCNC: 13.3 G/DL (ref 12–16)
IMM GRANULOCYTES # BLD AUTO: 0.03 K/UL (ref 0–0.04)
IMM GRANULOCYTES NFR BLD AUTO: 0.4 % (ref 0–0.5)
LYMPHOCYTES # BLD AUTO: 2.7 K/UL (ref 1–4.8)
LYMPHOCYTES NFR BLD: 32.8 % (ref 18–48)
MCH RBC QN AUTO: 29 PG (ref 27–31)
MCHC RBC AUTO-ENTMCNC: 31 G/DL (ref 32–36)
MCV RBC AUTO: 94 FL (ref 82–98)
MONOCYTES # BLD AUTO: 0.9 K/UL (ref 0.3–1)
MONOCYTES NFR BLD: 10.4 % (ref 4–15)
NEUTROPHILS # BLD AUTO: 4.2 K/UL (ref 1.8–7.7)
NEUTROPHILS NFR BLD: 51.9 % (ref 38–73)
NRBC BLD-RTO: 0 /100 WBC
PLATELET # BLD AUTO: 323 K/UL (ref 150–350)
PMV BLD AUTO: 10.4 FL (ref 9.2–12.9)
POTASSIUM SERPL-SCNC: 4.4 MMOL/L (ref 3.5–5.1)
PROT SERPL-MCNC: 7.4 G/DL (ref 6–8.4)
RBC # BLD AUTO: 4.58 M/UL (ref 4–5.4)
SODIUM SERPL-SCNC: 141 MMOL/L (ref 136–145)
WBC # BLD AUTO: 8.15 K/UL (ref 3.9–12.7)

## 2020-05-28 PROCEDURE — 90715 TDAP VACCINE 7 YRS/> IM: CPT | Mod: S$GLB,,, | Performed by: NURSE PRACTITIONER

## 2020-05-28 PROCEDURE — 36415 COLL VENOUS BLD VENIPUNCTURE: CPT | Mod: PO

## 2020-05-28 PROCEDURE — 99214 OFFICE O/P EST MOD 30 MIN: CPT | Mod: 25,S$GLB,, | Performed by: NURSE PRACTITIONER

## 2020-05-28 PROCEDURE — 90471 TDAP VACCINE GREATER THAN OR EQUAL TO 7YO IM: ICD-10-PCS | Mod: S$GLB,,, | Performed by: NURSE PRACTITIONER

## 2020-05-28 PROCEDURE — 99214 PR OFFICE/OUTPT VISIT, EST, LEVL IV, 30-39 MIN: ICD-10-PCS | Mod: 25,S$GLB,, | Performed by: NURSE PRACTITIONER

## 2020-05-28 PROCEDURE — 80053 COMPREHEN METABOLIC PANEL: CPT

## 2020-05-28 PROCEDURE — 90471 IMMUNIZATION ADMIN: CPT | Mod: S$GLB,,, | Performed by: NURSE PRACTITIONER

## 2020-05-28 PROCEDURE — 99999 PR PBB SHADOW E&M-EST. PATIENT-LVL IV: CPT | Mod: PBBFAC,,, | Performed by: NURSE PRACTITIONER

## 2020-05-28 PROCEDURE — 90715 TDAP VACCINE GREATER THAN OR EQUAL TO 7YO IM: ICD-10-PCS | Mod: S$GLB,,, | Performed by: NURSE PRACTITIONER

## 2020-05-28 PROCEDURE — 99999 PR PBB SHADOW E&M-EST. PATIENT-LVL IV: ICD-10-PCS | Mod: PBBFAC,,, | Performed by: NURSE PRACTITIONER

## 2020-05-28 PROCEDURE — 85025 COMPLETE CBC W/AUTO DIFF WBC: CPT

## 2020-05-28 PROCEDURE — 86803 HEPATITIS C AB TEST: CPT

## 2020-05-28 NOTE — PROGRESS NOTES
"History of Present Illness   Brianna Coughlin is a 55 y.o. woman with medical history as listed below who presents today for evaluation of RUQ abdominal pain. She reports pain has been present for "years." Pain is described as an intermittent, dull ache that is not associated with meals. Pain sometimes radiates around her back. Pain resolves without medication, eating, or other relieving factors. She does report intermittent diarrhea and constipation but this does not seem to be associated with the pain. She denies nausea, vomiting, BRBPR, or black tarry stool. She denies urinary complaints. Her GERD symptoms are well controlled with PPI. She is s/p cholecystectomy roughly 30 years prior. She has no additional complaints and is otherwise healthy on today's visit.    Past Medical History:   Diagnosis Date    Abnormal Pap smear     Anxiety     Breast cyst     Dysplasia of cervix     Fibroids     GERD (gastroesophageal reflux disease)     Hypertension     Ovarian cyst        Past Surgical History:   Procedure Laterality Date    CHOLECYSTECTOMY      conization of cervix      TUBAL LIGATION         Social History     Socioeconomic History    Marital status: Single     Spouse name: Not on file    Number of children: Not on file    Years of education: Not on file    Highest education level: Not on file   Occupational History    Not on file   Social Needs    Financial resource strain: Not on file    Food insecurity:     Worry: Not on file     Inability: Not on file    Transportation needs:     Medical: Not on file     Non-medical: Not on file   Tobacco Use    Smoking status: Never Smoker    Smokeless tobacco: Never Used   Substance and Sexual Activity    Alcohol use: Yes     Alcohol/week: 0.0 standard drinks     Comment: socially    Drug use: No    Sexual activity: Not Currently     Partners: Male   Lifestyle    Physical activity:     Days per week: Not on file     Minutes per session: Not on " "file    Stress: Not on file   Relationships    Social connections:     Talks on phone: Not on file     Gets together: Not on file     Attends Hinduism service: Not on file     Active member of club or organization: Not on file     Attends meetings of clubs or organizations: Not on file     Relationship status: Not on file   Other Topics Concern    Not on file   Social History Narrative    Not on file       Family History   Problem Relation Age of Onset    Hypertension Mother     Heart disease Mother     Heart disease Father     Stroke Maternal Grandfather        Review of Systems  Review of Systems   Constitutional: Negative for chills, fever, malaise/fatigue and weight loss.   Respiratory: Negative for cough, shortness of breath and wheezing.    Cardiovascular: Negative for chest pain and palpitations.   Gastrointestinal: Positive for abdominal pain, constipation and diarrhea. Negative for blood in stool, heartburn, melena, nausea and vomiting.   Genitourinary: Negative for dysuria, flank pain and hematuria.   Musculoskeletal: Positive for back pain (chronic).     A complete review of systems was otherwise negative.    Physical Exam  /76   Pulse 62   Temp 97.2 °F (36.2 °C) (Oral)   Ht 5' 4" (1.626 m)   Wt 76.9 kg (169 lb 10.3 oz)   SpO2 97%   BMI 29.12 kg/m²   General appearance: alert, appears stated age, cooperative and no distress  Back: symmetric, no curvature. ROM normal. No CVA tenderness.  Lungs: clear to auscultation bilaterally  Heart: regular rate and rhythm, S1, S2 normal, no murmur, click, rub or gallop  Abdomen: soft, non-tender; bowel sounds normal; no masses,  no organomegaly and no rebound tenderness, guarding, or rigidity  Extremities: extremities normal, atraumatic, no cyanosis or edema  Pulses: 2+ and symmetric  Skin: Skin color, texture, turgor normal. No rashes or lesions  Lymph nodes: Cervical, supraclavicular, and axillary nodes normal.  Neurologic: Grossly " normal    Assessment/Plan  Chronic RUQ pain  Unclear etiology for patient symptoms.  She has had several reassuring imaging studies.  We will check labs as listed below.  Refer to GI for further evaluation and work-up.  ER precautions discussed, no red flags on exam to suggest acute abdomen.  RTC PRN.  -     CBC auto differential; Future; Expected date: 05/28/2020  -     Comprehensive metabolic panel; Future; Expected date: 05/28/2020  -     Hepatitis C Antibody; Future; Expected date: 05/28/2020  -     Ambulatory referral/consult to Gastroenterology; Future; Expected date: 06/04/2020    Essential hypertension  The current medical regimen is effective;  continue present plan and medications.    Right lumbar radiculopathy  The current medical regimen is effective;  continue present plan and medications.    Chronic midline low back pain with right-sided sciatica  The current medical regimen is effective;  continue present plan and medications.    Need for Tdap vaccination  Given today.  -     (In Office Administered) Tdap Vaccine    Patient has verbalized understanding and is in agreement with plan of care.    Follow up if symptoms worsen or fail to improve.

## 2020-05-29 LAB — HCV AB SERPL QL IA: NEGATIVE

## 2020-06-01 ENCOUNTER — TELEPHONE (OUTPATIENT)
Dept: FAMILY MEDICINE | Facility: CLINIC | Age: 55
End: 2020-06-01

## 2020-06-01 NOTE — TELEPHONE ENCOUNTER
Please let the patient know her labs are all within normal limits. Keep follow-up with GI. Please let me know if she has any questions or concerns.    Thanks!  SNEHA Dan

## 2020-06-17 LAB
HPV, HIGH-RISK: NOT DETECTED
PAP SMEAR: NORMAL

## 2020-07-06 ENCOUNTER — TELEPHONE (OUTPATIENT)
Dept: ENDOSCOPY | Facility: HOSPITAL | Age: 55
End: 2020-07-06

## 2020-07-06 ENCOUNTER — OFFICE VISIT (OUTPATIENT)
Dept: GASTROENTEROLOGY | Facility: CLINIC | Age: 55
End: 2020-07-06
Payer: COMMERCIAL

## 2020-07-06 VITALS
BODY MASS INDEX: 29.28 KG/M2 | DIASTOLIC BLOOD PRESSURE: 84 MMHG | SYSTOLIC BLOOD PRESSURE: 133 MMHG | WEIGHT: 171.5 LBS | HEIGHT: 64 IN | HEART RATE: 61 BPM

## 2020-07-06 DIAGNOSIS — R13.10 DYSPHAGIA, UNSPECIFIED TYPE: ICD-10-CM

## 2020-07-06 DIAGNOSIS — G89.29 CHRONIC RUQ PAIN: Primary | ICD-10-CM

## 2020-07-06 DIAGNOSIS — Z87.11 HISTORY OF PEPTIC ULCER: ICD-10-CM

## 2020-07-06 DIAGNOSIS — R10.11 CHRONIC RUQ PAIN: Primary | ICD-10-CM

## 2020-07-06 DIAGNOSIS — Z90.49 S/P CHOLE: ICD-10-CM

## 2020-07-06 DIAGNOSIS — K21.9 GASTROESOPHAGEAL REFLUX DISEASE, ESOPHAGITIS PRESENCE NOT SPECIFIED: ICD-10-CM

## 2020-07-06 DIAGNOSIS — R19.8 IRREGULAR BOWEL HABITS: ICD-10-CM

## 2020-07-06 PROCEDURE — 99204 PR OFFICE/OUTPT VISIT, NEW, LEVL IV, 45-59 MIN: ICD-10-PCS | Mod: S$GLB,,, | Performed by: NURSE PRACTITIONER

## 2020-07-06 PROCEDURE — 99204 OFFICE O/P NEW MOD 45 MIN: CPT | Mod: S$GLB,,, | Performed by: NURSE PRACTITIONER

## 2020-07-06 PROCEDURE — 99999 PR PBB SHADOW E&M-EST. PATIENT-LVL V: CPT | Mod: PBBFAC,,, | Performed by: NURSE PRACTITIONER

## 2020-07-06 PROCEDURE — 99999 PR PBB SHADOW E&M-EST. PATIENT-LVL V: ICD-10-PCS | Mod: PBBFAC,,, | Performed by: NURSE PRACTITIONER

## 2020-07-06 NOTE — PROGRESS NOTES
[facility] Consultation Note    Reason for Consult:  The primary encounter diagnosis was Chronic RUQ pain. Diagnoses of S/P kun, Gastroesophageal reflux disease, esophagitis presence not specified, and Dysphagia, unspecified type were also pertinent to this visit.    PCP:   Yas Mcclendon   [address] [facility] / [place] [id number]    Referring MD:  Darling Martinez, Np  [address] [facility]  [place] [address]    Initial History of Present Illness (HPI):  This is a 55 y.o. female here for evaluation of Chronic RUQ abd pain. New patient.   This pain has been present for many years. She is s/p kun 30 years. Comes and goes. Unsure if related to bowel movements. Sometimes it can aggrivate her for a week and some times she can go a week without the pain.   She reports >10 years for this RUQ abd pain and was told she had GERD.    Reflux - yes but it is well controlled on the Prilosec QD  Dysphagia - yes, pills, liquids and solids  New onset of fluctuating bowel habits.constipation , incomplete evacuation. Then can have diarrhea. Started two years ago. Has not tried fiber     GI bleeding - none  NSAID usage - very rarely      ROS:  Constitutional: No fevers, chills, No weight loss  ENT:  No sore throat, no PND  CV: No chest pain, no palpitation  Pulm: No cough, No shortness of breath, no wheezing  Ophtho: No vision changes  GI: see HPI  Derm: No rash, no itching  Heme: No easy bruising  MSK: + arthritis  : No dysuria, No hematuria  Neuro: No syncope, No seizure  Psych: + anxiety, No uncontrolled depression    Medical History:  has a past medical history of Abnormal Pap smear, Anxiety, Breast cyst, Dysplasia of cervix, Fibroids, GERD (gastroesophageal reflux disease), Hypertension, and Ovarian cyst.    Surgical History:  has a past surgical history that includes Cholecystectomy; Tubal ligation; and conization of cervix.    Family History: family history includes Heart disease in her father and  "mother; Hypertension in her mother; Stroke in her maternal grandfather..     Social History:  reports that she has never smoked. She has never used smokeless tobacco. She reports current alcohol use. She reports that she does not use drugs.    Review of patient's allergies indicates:   Allergen Reactions    Acetaminophen-codeine Other (See Comments)     Stomach pains    Iodinated contrast media Anaphylaxis    Mobic [meloxicam] Other (See Comments)     STOMACH ULCERS    Iodine Other (See Comments)    Penicillin Hives    Sulfa (sulfonamide antibiotics) Nausea And Vomiting     Other reaction(s): Rash    Unable to assess      MEDICINES CONTAINING ASPIRIN AFFECT HER STOMACH/STOMACH ULCER    Codeine Rash    Penicillins Nausea And Vomiting and Rash       Medication List with Changes/Refills   Current Medications    ALPRAZOLAM (XANAX) 0.5 MG TABLET    TAKE ONE TABLET BY MOUTH AS DIRECTED AS NEEDED    ATENOLOL (TENORMIN) 100 MG TABLET    Take 1 tablet (100 mg total) by mouth once daily.    CO-ENZYME Q-10 30 MG CAPSULE    Take 30 mg by mouth 3 (three) times daily.    DICLOFENAC SODIUM (VOLTAREN) 1 % GEL    APPLY TWO GRAMS TOPICALLY FOUR TIMES DAILY    FLUOXETINE 10 MG CAPSULE    Take 1 capsule (10 mg total) by mouth once daily.    GABAPENTIN (NEURONTIN) 600 MG TABLET    TAKE 1 TABLET BY MOUTH FOUR TIMES A DAY    MULTIVITAMIN (ONE DAILY MULTIVITAMIN) PER TABLET    Take 1 tablet by mouth once daily.    OMEPRAZOLE (PRILOSEC) 40 MG CAPSULE    TAKE ONE CAPSULE BY MOUTH once DAILY    PROPRANOLOL (INDERAL) 20 MG TABLET    Take 1 tablet (20 mg total) by mouth 3 (three) times daily.    SOY ISOFLA/BLK COHOSH/MAG BARK (ESTROVEN ORAL)    Take by mouth.         Objective Findings:    Vital Signs:  /84   Pulse 61   Ht 5' 4" (1.626 m)   Wt 77.8 kg (171 lb 8.3 oz)   BMI 29.44 kg/m²   Body mass index is 29.44 kg/m².    Physical Exam:  General Appearance: Well appearing, NAD noted  Eyes:    No scleral icterus  ENT:  No " lesions or masses   Lungs: BBS CTA ,  no wheezes  Heart:  HRRR, S1 & S2 normal, no murmurs heard  Abdomen:  Non distended, soft, no guarding, no rebound, no tenderness, no appreciated ascites  Musculoskeletal:  No major joint deformities  Skin: No petechiae or rash on exposed skin areas  Neurologic:  Alert and oriented x4  Psychiatric:  Normal speech mentation and affect    Labs reviewed:  Lab Results   Component Value Date    WBC 8.15 05/28/2020    HGB 13.3 05/28/2020    HCT 42.9 05/28/2020     05/28/2020    CHOL 213 (H) 06/08/2018    TRIG 112 06/08/2018    HDL 52 06/08/2018    ALT 12 05/28/2020    AST 15 05/28/2020     05/28/2020    K 4.4 05/28/2020     05/28/2020    CREATININE 1.0 05/28/2020    BUN 17 05/28/2020    CO2 28 05/28/2020    TSH 1.583 04/13/2018    HGBA1C 5.2 04/13/2018           Imaging reviewed:  Abd us 2014 for upper abd pain   1. Cholecystectomy.  2. The common bile duct is prominent , it is unchanged from prior several studies, there is no obstructive process seen.  The common bile duct may be slightly prominent after cholecystectomy.     Endoscopy reviewed:    Reports colonoscopy at age 48 at [facility], normal, no specimens removed. I do not have those reports    EGD 2011 for epigastric pain     EGD 2006 duodenal ulcers.   Medical Decision Making:    Assessment:    Brianna Coughlin is a 55 y.o. female here for:    1. Chronic RUQ pain    2. S/P kun    3. Gastroesophageal reflux disease, esophagitis presence not specified    4. Dysphagia, unspecified type       Pt wants to make sure she does not have CA. Given the chronicity of her symptoms and neg US and EGDs in the past for this pain I reassured her this is less likely.   GERD stable on PPI. Has Hx of Duodenal ulcer, likely taking NSAIDs at that time.    She attributes her irreguler BMs to her poor diet, she does not want a colonoscopy    Recommendations:  1. EGD  2. ABD us  3. Fiber daily per avs.    F/u pending  orders    Order summary:  Orders Placed This Encounter    US Abdomen Limited    Case request GI: EGD (ESOPHAGOGASTRODUODENOSCOPY)         Thank you for allowing me to participate in the care of Brianna Sinclair, JES-C

## 2020-07-06 NOTE — PATIENT INSTRUCTIONS
Women need 25 grams of fiber per day, and men need 38 grams per day, according to the [employer].    If you're not meeting your recommended daily dose of fiber via food, fiber supplementation is beneficial in helping meet those daily recommendations. Be sure to drink plenty of water while taking fiber supplementation. Make sure to read fiber supplementation drug label directions regarding when and how to take the supplementation.    Dietary fiber content of frequently consumed foods  Food Fiber, g/serving   Fruits   Apple (with skin) 3.5/1 medium-sized apple   Apricot (fresh) 1.8/3 apricots   Banana 2.5/1 banana   Cantaloupe 2.7/half edible portion   Dates 13.5/1 cup (chopped)   Grapefruit 1.6/half edible portion   Grapes 2.6/10 grapes   Oranges 2.6/1 orange   Peach (with skin) 2.1/1 peach   Pear (with skin) 4.6/1 pear   Pineapple 2.2/1 cup (diced)   Prunes 11.9/11 dried prunes   Raisins 2.2/packet   Strawberries 3.0/1 cup   Juices   Apple 0.74/1 cup   Grapefruit 1.0/1 cup   Grape 1.3/1 cup   Orange 1.0/1 cup   Vegetables   Cooked   Asparagus 1.5/7 uriostegui   Beans, string, green 3.4/1 cup   Broccoli 5.0/1 stalk   [place] sprouts 4.6/7-8 sprouts   Cabbage 2.9/1 cup (cooked)   Carrots 4.6/1 cup   Cauliflower 2.1/1 cup   Peas 7.2/1 cup (cooked)   Potato (with skin) 2.3/1 boiled   Spinach 4.1/1 cup (raw)   Squash, summer 3.4/1 cup (cooked, diced)   Sweet potatoes 2.7/1 baked   Zucchini 4.2/1 cup (cooked, diced)   Raw   Cucumber 0.2/6-8 slices with skin   Lettuce 2.0/1 wedge iceberg   Mushrooms 0.8/half cup (sliced)   Onions 1.3/1 cup   Peppers, green 1.0/1 pod   Tomato 1.8/1 tomato   Spinach 8.0/1 cup (chopped)   Legumes   Baked beans 18.6/1 cup   Dried peas 4.7/half cup (cooked)   Kidney beans 7.4/half cup (cooked)   Lima beans 2.6/half cup (cooked)   Lentils 1.9/half cup (cooked)   Breads, pastas, and flours   Bagels 1.1/half bagel   Bran muffins 6.3/muffin   Cracked wheat 4.1/slice   Oatmeal 5.3/1 cup    Pumpernickel bread 1.0/slice   White bread 0.55/slice   Whole-wheat bread 1.66/slice   Pasta and rice cooked   Macaroni 1.0/1 cup (cooked)   Rice, brown 2.4/1 cup (cooked)   Rice, polished 0.6/1 cup (cooked)   Spaghetti (regular) 1.0/1 cup (cooked)   Flours and grains   Bran, oat 8.3/oz   Bran, wheat 12.4/oz   Rolled oats 13.7/1 cup (cooked)   Nuts   Almonds 3.6/half cup (slivered)   Peanuts 11.7/1 cup

## 2020-07-06 NOTE — LETTER
July 6, 2020      Darling Martinez, ESTEBAN  [address]  [place] [address]           [facility]  [address]  [place] [address]  Phone: [phone]  Fax: [phone]          Patient: Brianna Coughlin   MR Number: [id number]   YOB: 1965   Date of Visit: 7/6/2020       Dear Darling Martinez:    Thank you for referring Brianna Coughlin to me for evaluation. Attached you will find relevant portions of my assessment and plan of care.    If you have questions, please do not hesitate to call me. I look forward to following Brianna Coughlin along with you.    Sincerely,    Kavita Sinclair NP    Enclosure  CC:  No Recipients    If you would like to receive this communication electronically, please contact [email or web address] or [phone] to request more information on [facility] Link access.    For providers and/or their staff who would like to refer a patient to [facility], please contact us through our one-stop-shop provider referral line, [facility], at [phone].    If you feel you have received this communication in error or would no longer like to receive these types of communications, please e-mail [email or web address]

## 2020-07-06 NOTE — TELEPHONE ENCOUNTER
Patient here to schedule EGD.  States not ready at this time-has to check her schedule.  Main line phone number provided to return call.

## 2020-07-10 ENCOUNTER — HOSPITAL ENCOUNTER (OUTPATIENT)
Dept: RADIOLOGY | Facility: HOSPITAL | Age: 55
Discharge: HOME | End: 2020-07-10
Attending: NURSE PRACTITIONER
Payer: COMMERCIAL

## 2020-07-10 DIAGNOSIS — R10.11 CHRONIC RUQ PAIN: ICD-10-CM

## 2020-07-10 DIAGNOSIS — R13.10 DYSPHAGIA, UNSPECIFIED TYPE: ICD-10-CM

## 2020-07-10 DIAGNOSIS — Z90.49 S/P CHOLE: ICD-10-CM

## 2020-07-10 DIAGNOSIS — K21.9 GASTROESOPHAGEAL REFLUX DISEASE, ESOPHAGITIS PRESENCE NOT SPECIFIED: ICD-10-CM

## 2020-07-10 DIAGNOSIS — G89.29 CHRONIC RUQ PAIN: ICD-10-CM

## 2020-07-10 PROCEDURE — 76705 ECHO EXAM OF ABDOMEN: CPT | Mod: TC

## 2020-07-10 PROCEDURE — 76705 ECHO EXAM OF ABDOMEN: CPT | Mod: 26,,, | Performed by: RADIOLOGY

## 2020-07-10 PROCEDURE — 76705 US ABDOMEN LIMITED: ICD-10-PCS | Mod: 26,,, | Performed by: RADIOLOGY

## 2020-07-22 DIAGNOSIS — Z01.818 PRE-OP TESTING: ICD-10-CM

## 2020-08-03 ENCOUNTER — LAB VISIT (OUTPATIENT)
Dept: FAMILY MEDICINE | Facility: CLINIC | Age: 55
End: 2020-08-03
Payer: COMMERCIAL

## 2020-08-03 DIAGNOSIS — Z01.818 PRE-OP TESTING: ICD-10-CM

## 2020-08-03 PROCEDURE — U0003 INFECTIOUS AGENT DETECTION BY NUCLEIC ACID (DNA OR RNA); SEVERE ACUTE RESPIRATORY SYNDROME CORONAVIRUS 2 (SARS-COV-2) (CORONAVIRUS DISEASE [COVID-19]), AMPLIFIED PROBE TECHNIQUE, MAKING USE OF HIGH THROUGHPUT TECHNOLOGIES AS DESCRIBED BY CMS-2020-01-R: HCPCS

## 2020-08-04 LAB — SARS-COV-2 RNA RESP QL NAA+PROBE: NOT DETECTED

## 2020-08-05 ENCOUNTER — ANESTHESIA EVENT (OUTPATIENT)
Dept: ENDOSCOPY | Facility: HOSPITAL | Age: 55
End: 2020-08-05
Payer: COMMERCIAL

## 2020-08-06 ENCOUNTER — ANESTHESIA (OUTPATIENT)
Dept: ENDOSCOPY | Facility: HOSPITAL | Age: 55
End: 2020-08-06
Payer: COMMERCIAL

## 2020-08-06 ENCOUNTER — HOSPITAL ENCOUNTER (OUTPATIENT)
Facility: HOSPITAL | Age: 55
Discharge: HOME | End: 2020-08-06
Attending: INTERNAL MEDICINE | Admitting: INTERNAL MEDICINE
Payer: COMMERCIAL

## 2020-08-06 VITALS
HEART RATE: 59 BPM | HEIGHT: 64 IN | DIASTOLIC BLOOD PRESSURE: 72 MMHG | OXYGEN SATURATION: 100 % | SYSTOLIC BLOOD PRESSURE: 115 MMHG | TEMPERATURE: 98 F | WEIGHT: 170 LBS | RESPIRATION RATE: 18 BRPM | BODY MASS INDEX: 29.02 KG/M2

## 2020-08-06 DIAGNOSIS — R10.13 EPIGASTRIC ABDOMINAL PAIN: ICD-10-CM

## 2020-08-06 DIAGNOSIS — K21.9 GASTROESOPHAGEAL REFLUX DISEASE, ESOPHAGITIS PRESENCE NOT SPECIFIED: Primary | ICD-10-CM

## 2020-08-06 PROCEDURE — E9220 PRA ENDO ANESTHESIA: HCPCS | Mod: ,,, | Performed by: NURSE ANESTHETIST, CERTIFIED REGISTERED

## 2020-08-06 PROCEDURE — 27201012 HC FORCEPS, HOT/COLD, DISP: Performed by: INTERNAL MEDICINE

## 2020-08-06 PROCEDURE — 37000009 HC ANESTHESIA EA ADD 15 MINS: Performed by: INTERNAL MEDICINE

## 2020-08-06 PROCEDURE — 43239 EGD BIOPSY SINGLE/MULTIPLE: CPT | Performed by: INTERNAL MEDICINE

## 2020-08-06 PROCEDURE — 88305 TISSUE EXAM BY PATHOLOGIST: ICD-10-PCS | Mod: 26,,, | Performed by: PATHOLOGY

## 2020-08-06 PROCEDURE — 88305 TISSUE EXAM BY PATHOLOGIST: CPT | Performed by: PATHOLOGY

## 2020-08-06 PROCEDURE — 25000003 PHARM REV CODE 250: Performed by: NURSE ANESTHETIST, CERTIFIED REGISTERED

## 2020-08-06 PROCEDURE — 37000008 HC ANESTHESIA 1ST 15 MINUTES: Performed by: INTERNAL MEDICINE

## 2020-08-06 PROCEDURE — 88305 TISSUE EXAM BY PATHOLOGIST: CPT | Mod: 26,,, | Performed by: PATHOLOGY

## 2020-08-06 PROCEDURE — 43239 PR EGD, FLEX, W/BIOPSY, SGL/MULTI: ICD-10-PCS | Mod: ,,, | Performed by: INTERNAL MEDICINE

## 2020-08-06 PROCEDURE — 63600175 PHARM REV CODE 636 W HCPCS: Performed by: NURSE ANESTHETIST, CERTIFIED REGISTERED

## 2020-08-06 PROCEDURE — 25000003 PHARM REV CODE 250: Performed by: INTERNAL MEDICINE

## 2020-08-06 PROCEDURE — E9220 PRA ENDO ANESTHESIA: ICD-10-PCS | Mod: ,,, | Performed by: NURSE ANESTHETIST, CERTIFIED REGISTERED

## 2020-08-06 PROCEDURE — 43239 EGD BIOPSY SINGLE/MULTIPLE: CPT | Mod: ,,, | Performed by: INTERNAL MEDICINE

## 2020-08-06 RX ORDER — SODIUM CHLORIDE 9 MG/ML
INJECTION, SOLUTION INTRAVENOUS CONTINUOUS
Status: DISCONTINUED | OUTPATIENT
Start: 2020-08-06 | End: 2020-08-06 | Stop reason: HOSPADM

## 2020-08-06 RX ORDER — LIDOCAINE HYDROCHLORIDE 20 MG/ML
INJECTION INTRAVENOUS
Status: DISCONTINUED | OUTPATIENT
Start: 2020-08-06 | End: 2020-08-06

## 2020-08-06 RX ORDER — PROPOFOL 10 MG/ML
VIAL (ML) INTRAVENOUS
Status: DISCONTINUED | OUTPATIENT
Start: 2020-08-06 | End: 2020-08-06

## 2020-08-06 RX ORDER — PROPOFOL 10 MG/ML
VIAL (ML) INTRAVENOUS CONTINUOUS PRN
Status: DISCONTINUED | OUTPATIENT
Start: 2020-08-06 | End: 2020-08-06

## 2020-08-06 RX ORDER — GLYCOPYRROLATE 0.2 MG/ML
INJECTION INTRAMUSCULAR; INTRAVENOUS
Status: DISCONTINUED | OUTPATIENT
Start: 2020-08-06 | End: 2020-08-06

## 2020-08-06 RX ORDER — ONDANSETRON 2 MG/ML
INJECTION INTRAMUSCULAR; INTRAVENOUS
Status: DISCONTINUED | OUTPATIENT
Start: 2020-08-06 | End: 2020-08-06

## 2020-08-06 RX ORDER — FENTANYL CITRATE 50 UG/ML
INJECTION, SOLUTION INTRAMUSCULAR; INTRAVENOUS
Status: DISCONTINUED | OUTPATIENT
Start: 2020-08-06 | End: 2020-08-06

## 2020-08-06 RX ADMIN — FENTANYL CITRATE 25 MCG: 50 INJECTION, SOLUTION INTRAMUSCULAR; INTRAVENOUS at 09:08

## 2020-08-06 RX ADMIN — GLYCOPYRROLATE 0.2 MG: 0.2 INJECTION, SOLUTION INTRAMUSCULAR; INTRAVENOUS at 09:08

## 2020-08-06 RX ADMIN — ONDANSETRON 4 MG: 2 INJECTION INTRAMUSCULAR; INTRAVENOUS at 09:08

## 2020-08-06 RX ADMIN — PROPOFOL 70 MG: 10 INJECTION, EMULSION INTRAVENOUS at 09:08

## 2020-08-06 RX ADMIN — LIDOCAINE HYDROCHLORIDE 100 MG: 20 INJECTION, SOLUTION INTRAVENOUS at 09:08

## 2020-08-06 RX ADMIN — SODIUM CHLORIDE: 0.9 INJECTION, SOLUTION INTRAVENOUS at 08:08

## 2020-08-06 RX ADMIN — PROPOFOL 200 MCG/KG/MIN: 10 INJECTION, EMULSION INTRAVENOUS at 09:08

## 2020-08-06 NOTE — DISCHARGE INSTRUCTIONS

## 2020-08-06 NOTE — H&P
Short Stay Endoscopy History and Physical    PCP - Yas Mcclendon MD     Procedure - EGD  ASA - per anesthesia  Mallampati - per anesthesia  History of Anesthesia problems - no  Family history Anesthesia problems -  no   Plan of anesthesia - General    HPI:  This is a 55 y.o. female here for evaluation of :     dysphagia, RUQ pain      ROS:  Constitutional: No fevers, chills, No weight loss  CV: No chest pain  Pulm: No cough, No shortness of breath  Ophtho: No vision changes  GI: see HPI  Derm: No rash    Medical History:  has a past medical history of Abnormal Pap smear, Anxiety, Breast cyst, Dysplasia of cervix, Fibroids, GERD (gastroesophageal reflux disease), Hypertension, and Ovarian cyst.    Surgical History:  has a past surgical history that includes Cholecystectomy; Tubal ligation; and conization of cervix.    Family History: family history includes Heart disease in her father and mother; Hypertension in her mother; Stroke in her maternal grandfather.. Otherwise no colon cancer, inflammatory bowel disease, or GI malignancies.    Social History:  reports that she has never smoked. She has never used smokeless tobacco. She reports current alcohol use. She reports that she does not use drugs.    Review of patient's allergies indicates:   Allergen Reactions    Acetaminophen-codeine Other (See Comments)     Stomach pains    Iodinated contrast media Anaphylaxis    Mobic [meloxicam] Other (See Comments)     STOMACH ULCERS    Iodine Other (See Comments)    Penicillin Hives    Sulfa (sulfonamide antibiotics) Nausea And Vomiting     Other reaction(s): Rash    Unable to assess      MEDICINES CONTAINING ASPIRIN AFFECT HER STOMACH/STOMACH ULCER    Codeine Rash    Penicillins Nausea And Vomiting and Rash       Medications:   Medications Prior to Admission   Medication Sig Dispense Refill Last Dose    ALPRAZolam (XANAX) 0.5 MG tablet TAKE ONE TABLET BY MOUTH AS DIRECTED AS NEEDED 30 tablet 2 Past Week at  Unknown time    atenoloL (TENORMIN) 100 MG tablet TAKE 1 TABLET BY MOUTH ONCE DAILY. 90 tablet 0 8/6/2020 at Unknown time    diclofenac sodium (VOLTAREN) 1 % Gel APPLY TWO GRAMS TOPICALLY FOUR TIMES DAILY 300 g 2 Past Week at Unknown time    gabapentin (NEURONTIN) 600 MG tablet TAKE 1 TABLET BY MOUTH FOUR TIMES A  tablet 1 8/6/2020 at Unknown time    omeprazole (PRILOSEC) 40 MG capsule TAKE ONE CAPSULE BY MOUTH ONCE DAILY 90 capsule 2 8/5/2020 at Unknown time    co-enzyme Q-10 30 mg capsule Take 30 mg by mouth 3 (three) times daily.       FLUoxetine 10 MG capsule Take 1 capsule (10 mg total) by mouth once daily. (Patient not taking: Reported on 5/28/2020) 90 capsule 3 More than a month at Unknown time    multivitamin (ONE DAILY MULTIVITAMIN) per tablet Take 1 tablet by mouth once daily.   More than a month at Unknown time    propranolol (INDERAL) 20 MG tablet Take 1 tablet (20 mg total) by mouth 3 (three) times daily. (Patient not taking: Reported on 7/6/2020) 90 tablet 0 More than a month at Unknown time    soy isofla/blk cohosh/mag bark (ESTROVEN ORAL) Take by mouth.   More than a month at Unknown time       Physical Exam:    Vital Signs:   Vitals:    08/06/20 0904   BP: 135/65   Pulse: (!) 56   Resp: 16   Temp: 98.1 °F (36.7 °C)       General Appearance: Well appearing in no acute distress  Eyes:    No scleral icterus  ENT: Neck supple, Lips, mucosa, and tongue normal; teeth and gums normal  Lungs: CTA anteriorly  Heart:  Regular rate, S1, S2 normal, no murmurs heard.  Abdomen: Soft, non tender, non distended with normal bowel sounds. No hepatosplenomegaly, ascites, or mass.  Extremities: No edema  Skin: No rash    Labs:  Lab Results   Component Value Date    WBC 8.15 05/28/2020    HGB 13.3 05/28/2020    HCT 42.9 05/28/2020     05/28/2020    CHOL 213 (H) 06/08/2018    TRIG 112 06/08/2018    HDL 52 06/08/2018    ALT 12 05/28/2020    AST 15 05/28/2020     05/28/2020    K 4.4 05/28/2020      05/28/2020    CREATININE 1.0 05/28/2020    BUN 17 05/28/2020    CO2 28 05/28/2020    TSH 1.583 04/13/2018    HGBA1C 5.2 04/13/2018       I have explained the risks and benefits of endoscopy procedures to the patient including but not limited to bleeding, perforation, infection, and death.  The patient was asked if they understand and allowed to ask any further questions to their satisfaction.      Renard Gallegos MD

## 2020-08-06 NOTE — ANESTHESIA POSTPROCEDURE EVALUATION
Anesthesia Post Evaluation    Patient: Brianna Coughlin    Procedure(s) Performed: Procedure(s) (LRB):  EGD (ESOPHAGOGASTRODUODENOSCOPY) (N/A)    Final Anesthesia Type: general    Patient location during evaluation: PACU  Patient participation: Yes- Able to Participate  Level of consciousness: awake and alert  Post-procedure vital signs: reviewed and stable  Pain management: adequate  Airway patency: patent    PONV status at discharge: No PONV  Anesthetic complications: no      Cardiovascular status: blood pressure returned to baseline and stable  Respiratory status: unassisted  Hydration status: euvolemic  Follow-up not needed.          Vitals Value Taken Time   /72 08/06/20 1032   Temp 36.7 °C (98 °F) 08/06/20 1010   Pulse 59 08/06/20 1032   Resp 18 08/06/20 1032   SpO2 100 % 08/06/20 1032         No case tracking events are documented in the log.      Pain/Gato Score: Gato Score: 10 (8/6/2020 10:17 AM)

## 2020-08-06 NOTE — [employer] PATIENT INSTRUCTIONS
Discharge Summary/Instructions after an Endoscopic Procedure  Patient Name: Brianna Coughlin  Patient MRN: [id number]  Patient YOB: 1965 Thursday, August 6, 2020  Renard Gallegos MD  RESTRICTIONS:  During your procedure today, you received medications for sedation.  These   medications may affect your judgment, balance and coordination.  Therefore,   for 24 hours, you have the following restrictions:   - DO NOT drive a car, operate machinery, make legal/financial decisions,   sign important papers or drink alcohol.    ACTIVITY:  Today: no heavy lifting, straining or running due to procedural   sedation/anesthesia.  The following day: return to full activity including work.  DIET:  Eat and drink normally unless instructed otherwise.     TREATMENT FOR COMMON SIDE EFFECTS:  - Mild abdominal pain, nausea, belching, bloating or excessive gas:  rest,   eat lightly and use a heating pad.  - Sore Throat: treat with throat lozenges and/or gargle with warm salt   water.  - Because air was used during the procedure, expelling large amounts of air   from your rectum or belching is normal.  - If a bowel prep was taken, you may not have a bowel movement for 1-3 days.    This is normal.  SYMPTOMS TO WATCH FOR AND REPORT TO YOUR PHYSICIAN:  1. Abdominal pain or bloating, other than gas cramps.  2. Chest pain.  3. Back pain.  4. Signs of infection such as: chills or fever occurring within 24 hours   after the procedure.  5. Rectal bleeding, which would show as bright red, maroon, or black stools.   (A tablespoon of blood from the rectum is not serious, especially if   hemorrhoids are present.)  6. Vomiting.  7. Weakness or dizziness.  GO DIRECTLY TO THE NEAREST EMERGENCY ROOM IF YOU HAVE ANY OF THE FOLLOWING:      Difficulty breathing              Chills and/or fever over 101 F   Persistent vomiting and/or vomiting blood   Severe abdominal pain   Severe chest pain   Black, tarry stools   Bleeding- more than one tablespoon   Any  other symptom or condition that you feel may need urgent attention  Your doctor recommends these additional instructions:  If any biopsies were taken, your doctors clinic will contact you in 1 to 2   weeks with any results.  - Patient has a contact number available for emergencies.  The signs and   symptoms of potential delayed complications were discussed with the   patient.  Return to normal activities tomorrow.  Written discharge   instructions were provided to the patient.   - Discharge patient to home.   - Await pathology results.   - Return to nurse practitioner as previously scheduled.   - The findings and recommendations were discussed with the designated   responsible adult.   For questions, problems or results please call your physician - Renard Gallegos MD at Work:  [phone].  [facility] [place], EMERGENCY ROOM PHONE NUMBER: [phone]  IF A COMPLICATION OR EMERGENCY SITUATION ARISES AND YOU ARE UNABLE TO REACH   YOUR PHYSICIAN - GO DIRECTLY TO THE EMERGENCY ROOM.  Renard Gallegos MD  8/6/2020 10:04:45 AM  This report has been verified and signed electronically.  [employer]

## 2020-08-06 NOTE — TRANSFER OF CARE
"Anesthesia Transfer of Care Note    Patient: Brianna Coughlin    Procedure(s) Performed: Procedure(s) (LRB):  EGD (ESOPHAGOGASTRODUODENOSCOPY) (N/A)    Patient location: GI    Anesthesia Type: general    Transport from OR: Transported from OR on room air with adequate spontaneous ventilation    Post pain: adequate analgesia    Post assessment: no apparent anesthetic complications and tolerated procedure well    Post vital signs: stable    Level of consciousness: responds to stimulation    Nausea/Vomiting: no nausea/vomiting    Complications: none    Transfer of care protocol was followed      Last vitals:   Visit Vitals  /65 (BP Location: Left arm, Patient Position: Lying)   Pulse (!) 56   Temp 36.7 °C (98.1 °F) (Skin)   Resp 16   Ht 5' 4" (1.626 m)   Wt 77.1 kg (170 lb)   SpO2 100%   Breastfeeding No   BMI 29.18 kg/m²     "

## 2020-08-13 LAB
FINAL PATHOLOGIC DIAGNOSIS: NORMAL
GROSS: NORMAL

## 2020-08-14 ENCOUNTER — PATIENT MESSAGE (OUTPATIENT)
Dept: GASTROENTEROLOGY | Facility: CLINIC | Age: 55
End: 2020-08-14

## 2020-08-14 NOTE — TELEPHONE ENCOUNTER
[facility] to [facility]

## 2020-09-23 DIAGNOSIS — M54.16 RIGHT LUMBAR RADICULOPATHY: ICD-10-CM

## 2020-09-23 RX ORDER — GABAPENTIN 600 MG/1
600 TABLET ORAL 4 TIMES DAILY
Qty: 120 TABLET | Refills: 1 | Status: SHIPPED | OUTPATIENT
Start: 2020-09-23 | End: 2020-10-15

## 2020-09-23 NOTE — TELEPHONE ENCOUNTER
----- Message from Jeffry Romano sent at 9/23/2020  8:41 AM CDT -----  Contact: Pt @[phone]  Patient calling to get an update on the medication refill request on ( gabapentin (NEURONTIN) 600 MG tablet)     [facility]/pharmacy #[id number] - [place] - [address]  [address] [place] [address]  Phone: [phone] Fax: [phone]

## 2020-09-23 NOTE — TELEPHONE ENCOUNTER
Patient last seen 02/16/2017      Last Rx refill-----07/01/20-Gabapentin  Last office visit--02/16/17  Next office visit--

## 2020-11-04 ENCOUNTER — PATIENT MESSAGE (OUTPATIENT)
Dept: FAMILY MEDICINE | Facility: CLINIC | Age: 55
End: 2020-11-04

## 2020-11-05 ENCOUNTER — PATIENT MESSAGE (OUTPATIENT)
Dept: FAMILY MEDICINE | Facility: CLINIC | Age: 55
End: 2020-11-05

## 2020-11-05 ENCOUNTER — OFFICE VISIT (OUTPATIENT)
Dept: FAMILY MEDICINE | Facility: CLINIC | Age: 55
End: 2020-11-05
Payer: COMMERCIAL

## 2020-11-05 VITALS
BODY MASS INDEX: 28.36 KG/M2 | DIASTOLIC BLOOD PRESSURE: 76 MMHG | WEIGHT: 166.13 LBS | SYSTOLIC BLOOD PRESSURE: 122 MMHG | HEIGHT: 64 IN | OXYGEN SATURATION: 97 % | HEART RATE: 67 BPM | TEMPERATURE: 99 F

## 2020-11-05 DIAGNOSIS — M54.16 RIGHT LUMBAR RADICULOPATHY: ICD-10-CM

## 2020-11-05 DIAGNOSIS — K21.9 GASTROESOPHAGEAL REFLUX DISEASE, UNSPECIFIED WHETHER ESOPHAGITIS PRESENT: ICD-10-CM

## 2020-11-05 DIAGNOSIS — Z00.00 ROUTINE MEDICAL EXAM: Primary | ICD-10-CM

## 2020-11-05 DIAGNOSIS — M54.41 CHRONIC MIDLINE LOW BACK PAIN WITH RIGHT-SIDED SCIATICA: ICD-10-CM

## 2020-11-05 DIAGNOSIS — I10 ESSENTIAL HYPERTENSION: ICD-10-CM

## 2020-11-05 DIAGNOSIS — E78.01 FAMILIAL HYPERCHOLESTEROLEMIA: ICD-10-CM

## 2020-11-05 DIAGNOSIS — G89.29 CHRONIC MIDLINE LOW BACK PAIN WITH RIGHT-SIDED SCIATICA: ICD-10-CM

## 2020-11-05 DIAGNOSIS — K90.0 CELIAC DISEASE: ICD-10-CM

## 2020-11-05 DIAGNOSIS — M79.7 FIBROMYALGIA: ICD-10-CM

## 2020-11-05 PROCEDURE — 99396 PREV VISIT EST AGE 40-64: CPT | Mod: S$GLB,,, | Performed by: NURSE PRACTITIONER

## 2020-11-05 PROCEDURE — 99396 PR PREVENTIVE VISIT,EST,40-64: ICD-10-PCS | Mod: S$GLB,,, | Performed by: NURSE PRACTITIONER

## 2020-11-05 PROCEDURE — 99999 PR PBB SHADOW E&M-EST. PATIENT-LVL IV: ICD-10-PCS | Mod: PBBFAC,,, | Performed by: NURSE PRACTITIONER

## 2020-11-05 PROCEDURE — 99999 PR PBB SHADOW E&M-EST. PATIENT-LVL IV: CPT | Mod: PBBFAC,,, | Performed by: NURSE PRACTITIONER

## 2020-11-05 RX ORDER — ATENOLOL AND CHLORTHALIDONE TABLET 100; 25 MG/1; MG/1
TABLET ORAL
COMMUNITY
Start: 2020-04-20 | End: 2022-08-12

## 2020-11-05 NOTE — PROGRESS NOTES
History of Present Illness   Brianna Coughlin is a 55 y.o. woman with medical history as listed below who presents today for routine physical exam. It has been about year since her last physical. She had recent reassuring labs. She is taking medications as prescribed without perceived SE. She was recently diagnosed with celiac disease per GI; she is doing well following gluten free diet and reports her dyspepsia and bloating have resolved. She has no additional complaints and is otherwise healthy on today's visit. We will address her HM today. Pertinent negatives as listed in ROS.    Past Medical History:   Diagnosis Date    Abnormal Pap smear     Anxiety     Breast cyst     Dysplasia of cervix     Fibroids     GERD (gastroesophageal reflux disease)     Hypertension     Ovarian cyst        Past Surgical History:   Procedure Laterality Date    CHOLECYSTECTOMY      conization of cervix      ESOPHAGOGASTRODUODENOSCOPY N/A 8/6/2020    Procedure: EGD (ESOPHAGOGASTRODUODENOSCOPY);  Surgeon: Renard Gallegos MD;  Location: [address];  Service: Endoscopy;  Laterality: N/A;    TUBAL LIGATION         Social History     Socioeconomic History    Marital status: Single     Spouse name: Not on file    Number of children: Not on file    Years of education: Not on file    Highest education level: Not on file   Occupational History    Not on file   Social Needs    Financial resource strain: Not on file    Food insecurity     Worry: Not on file     Inability: Not on file    Transportation needs     Medical: Not on file     Non-medical: Not on file   Tobacco Use    Smoking status: Never Smoker    Smokeless tobacco: Never Used   Substance and Sexual Activity    Alcohol use: Yes     Alcohol/week: 0.0 standard drinks     Comment: socially    Drug use: No    Sexual activity: Not Currently     Partners: Male   Lifestyle    Physical activity     Days per week: Not on file     Minutes per session: Not on file  "   Stress: Not on file   Relationships    Social connections     Talks on phone: Not on file     Gets together: Not on file     Attends [employer] service: Not on file     Active member of club or organization: Not on file     Attends meetings of clubs or organizations: Not on file     Relationship status: Not on file   Other Topics Concern    Not on file   Social History Narrative    Not on file       Family History   Problem Relation Age of Onset    Hypertension Mother     Heart disease Mother     Heart disease Father     Stroke Maternal Grandfather        Review of Systems  Review of Systems   Constitutional: Negative for malaise/fatigue and weight loss.   HENT: Negative for hearing loss and tinnitus.    Eyes: Negative for blurred vision and double vision.   Respiratory: Negative for shortness of breath and wheezing.    Cardiovascular: Negative for chest pain, palpitations, orthopnea, claudication and leg swelling.   Gastrointestinal: Positive for constipation. Negative for abdominal pain, blood in stool, diarrhea, heartburn and melena.   Genitourinary: Negative for flank pain and hematuria.   Musculoskeletal: Positive for back pain and joint pain. Negative for falls and neck pain.   Skin: Negative for itching and rash.   Neurological: Negative for dizziness, loss of consciousness and headaches.   Endo/Heme/Allergies: Negative for environmental allergies and polydipsia.   Psychiatric/Behavioral: Negative for depression. The patient is not nervous/anxious and does not have insomnia.      A complete review of systems was otherwise negative.    Physical Exam  /76   Pulse 67   Temp 98.5 °F (36.9 °C) (Temporal)   Ht 5' 4" (1.626 m)   Wt 75.4 kg (166 lb 1.9 oz)   SpO2 97%   BMI 28.51 kg/m²   General appearance: alert, appears stated age, cooperative and no distress  Head: Normocephalic, without obvious abnormality, atraumatic  Eyes: negative findings: lids and lashes normal, conjunctivae and sclerae " normal and pupils equal, round, reactive to light and accomodation  Ears: normal TM's and external ear canals both ears  Nose: Nares normal. Septum midline. Mucosa normal. No drainage or sinus tenderness.  Throat: lips, mucosa, and tongue normal; teeth and gums normal  Neck: no carotid bruit, no JVD, supple, symmetrical, trachea midline and thyroid not enlarged, symmetric, no tenderness/mass/nodules  Back: symmetric, no curvature. ROM normal. No CVA tenderness.  Lungs: clear to auscultation bilaterally  Heart: regular rate and rhythm, S1, S2 normal, no murmur, click, rub or gallop  Abdomen: soft, non-tender; bowel sounds normal; no masses,  no organomegaly  Extremities: extremities normal, atraumatic, no cyanosis or edema  Pulses: 2+ and symmetric  Skin: Skin color, texture, turgor normal. No rashes or lesions  Lymph nodes: Cervical, supraclavicular, and axillary nodes normal.  Neurologic: Grossly normal    Assessment/Plan  Routine medical exam  Age appropriate screening recommendations and HM were discussed and updated.  Discussed importance of heart healthy diet and exercise.  Reviewed recent labs with patient.  Return in one year for routine physical.    Essential hypertension  The current medical regimen is effective;  continue present plan and medications.    Familial hypercholesterolemia  The current medical regimen is effective;  continue present plan and medications.    Celiac disease  The current medical regimen is effective;  continue present plan and medications.  Doing well with gluten free diet.    Gastroesophageal reflux disease, unspecified whether esophagitis present  The current medical regimen is effective;  continue present plan and medications.    Right lumbar radiculopathy  The current medical regimen is effective;  continue present plan and medications.    Chronic midline low back pain with right-sided sciatica  The current medical regimen is effective;  continue present plan and  medications.    Fibromyalgia  The current medical regimen is effective;  continue present plan and medications.    Patient has verbalized understanding and is in agreement with plan of care.    Follow up in about 1 year (around 11/5/2021) for routine physical.

## 2020-11-16 ENCOUNTER — PATIENT MESSAGE (OUTPATIENT)
Dept: FAMILY MEDICINE | Facility: CLINIC | Age: 55
End: 2020-11-16

## 2020-11-16 RX ORDER — ALPRAZOLAM 0.5 MG/1
TABLET ORAL
Qty: 30 TABLET | Refills: 2 | OUTPATIENT
Start: 2020-11-16

## 2020-11-18 ENCOUNTER — PATIENT MESSAGE (OUTPATIENT)
Dept: FAMILY MEDICINE | Facility: CLINIC | Age: 55
End: 2020-11-18

## 2020-11-18 RX ORDER — ALPRAZOLAM 0.5 MG/1
TABLET ORAL
Qty: 30 TABLET | Refills: 2 | OUTPATIENT
Start: 2020-11-18

## 2020-11-19 RX ORDER — ALPRAZOLAM 0.5 MG/1
TABLET ORAL
Qty: 30 TABLET | Refills: 2 | OUTPATIENT
Start: 2020-11-19

## 2020-11-19 NOTE — TELEPHONE ENCOUNTER
----- Message from Leonila Cortes sent at 11/19/2020 10:13 AM CST -----  Regarding: REFILL  Contact: Patient  Type: RX Refill Request    Who Called:  Patient     Have you contacted your pharmacy:    Refill or New Rx: Refill     RX Name and Strength: ALPRAZolam (XANAX) 0.5 MG tablet    How is the patient currently taking it? (ex. 1XDay):    Is this a 30 day or 90 day RX:    Preferred Pharmacy with phone number:   [facility]/pharmacy #[id number] - [place] - [address] [facility]  [address] [facility]  [place] [address]  Phone: [phone] Fax: [phone]        Local or Mail Order: Local     Ordering Provider: Dr. Mcclendon     Would the patient rather a call back or a response via My [facility]?  My chart     Best Call Back Number: [phone]

## 2020-12-01 ENCOUNTER — OFFICE VISIT (OUTPATIENT)
Dept: FAMILY MEDICINE | Facility: CLINIC | Age: 55
End: 2020-12-01
Payer: COMMERCIAL

## 2020-12-01 VITALS
OXYGEN SATURATION: 99 % | WEIGHT: 169 LBS | TEMPERATURE: 98 F | BODY MASS INDEX: 28.85 KG/M2 | HEART RATE: 60 BPM | SYSTOLIC BLOOD PRESSURE: 124 MMHG | HEIGHT: 64 IN | DIASTOLIC BLOOD PRESSURE: 80 MMHG

## 2020-12-01 DIAGNOSIS — I10 ESSENTIAL HYPERTENSION: ICD-10-CM

## 2020-12-01 PROCEDURE — 99213 OFFICE O/P EST LOW 20 MIN: CPT | Mod: S$GLB,,, | Performed by: FAMILY MEDICINE

## 2020-12-01 PROCEDURE — 99999 PR PBB SHADOW E&M-EST. PATIENT-LVL IV: CPT | Mod: PBBFAC,,, | Performed by: FAMILY MEDICINE

## 2020-12-01 PROCEDURE — 99213 PR OFFICE/OUTPT VISIT, EST, LEVL III, 20-29 MIN: ICD-10-PCS | Mod: S$GLB,,, | Performed by: FAMILY MEDICINE

## 2020-12-01 PROCEDURE — 99999 PR PBB SHADOW E&M-EST. PATIENT-LVL IV: ICD-10-PCS | Mod: PBBFAC,,, | Performed by: FAMILY MEDICINE

## 2020-12-01 RX ORDER — ATENOLOL 100 MG/1
100 TABLET ORAL DAILY
Qty: 90 TABLET | Refills: 3 | Status: SHIPPED | OUTPATIENT
Start: 2020-12-01 | End: 2021-12-01

## 2020-12-01 RX ORDER — ALPRAZOLAM 0.5 MG/1
TABLET ORAL
Qty: 30 TABLET | Refills: 2 | Status: SHIPPED | OUTPATIENT
Start: 2020-12-01 | End: 2021-09-12

## 2020-12-16 NOTE — PROGRESS NOTES
Assessment & Plan  Problem List Items Addressed This Visit        Cardiac/Vascular    Essential hypertension    Relevant Medications    atenoloL (TENORMIN) 100 MG tablet            Health Maintenance reviewed.    Follow-up: No follow-ups on file.    ______________________________________________________________________    Chief Complaint  Chief Complaint   Patient presents with    Medication Refill     xanax        HPI  Brianna Coughlin is a 55 y.o. female with multiple medical diagnoses as listed in the medical history and problem list that presents for medication refill.  Pt is known to me with last appointment 10/9/2019.      Hypertension: The patient reports that they check their blood pressures regularly and blood pressure is generally well controlled (<= 139/89).  The patient  is not enrolled in the digital hypertension program. The patient denies  cardiac chest pain, shortness of breath, lower extremity edema, headaches and side effects of medication. The patient reports problems with  none. The patient  has been compliant with the current medication regimen.  The patient : tries to follow a low salt diet.      PAST MEDICAL HISTORY:  Past Medical History:   Diagnosis Date    Abnormal Pap smear     Anxiety     Breast cyst     Dysplasia of cervix     Fibroids     GERD (gastroesophageal reflux disease)     Hypertension     Ovarian cyst        PAST SURGICAL HISTORY:  Past Surgical History:   Procedure Laterality Date    CHOLECYSTECTOMY      conization of cervix      ESOPHAGOGASTRODUODENOSCOPY N/A 8/6/2020    Procedure: EGD (ESOPHAGOGASTRODUODENOSCOPY);  Surgeon: Renard Gallegos MD;  Location: [address];  Service: Endoscopy;  Laterality: N/A;    TUBAL LIGATION         SOCIAL HISTORY:  Social History     Socioeconomic History    Marital status: Single     Spouse name: Not on file    Number of children: Not on file    Years of education: Not on file    Highest education level: Not on file    Occupational History    Not on file   Social Needs    Financial resource strain: Not on file    Food insecurity     Worry: Not on file     Inability: Not on file    Transportation needs     Medical: Not on file     Non-medical: Not on file   Tobacco Use    Smoking status: Never Smoker    Smokeless tobacco: Never Used   Substance and Sexual Activity    Alcohol use: Yes     Alcohol/week: 0.0 standard drinks     Comment: socially    Drug use: No    Sexual activity: Not Currently     Partners: Male   Lifestyle    Physical activity     Days per week: Not on file     Minutes per session: Not on file    Stress: Not on file   Relationships    Social connections     Talks on phone: Not on file     Gets together: Not on file     Attends [employer] service: Not on file     Active member of club or organization: Not on file     Attends meetings of clubs or organizations: Not on file     Relationship status: Not on file   Other Topics Concern    Not on file   Social History Narrative    Not on file       FAMILY HISTORY:  Family History   Problem Relation Age of Onset    Hypertension Mother     Heart disease Mother     Heart disease Father     Stroke Maternal Grandfather        ALLERGIES AND MEDICATIONS: updated and reviewed.  Review of patient's allergies indicates:   Allergen Reactions    Acetaminophen-codeine Other (See Comments)     Stomach pains    Iodinated contrast media Anaphylaxis    Mobic [meloxicam] Other (See Comments)     STOMACH ULCERS    Iodine Other (See Comments)    Penicillin Hives    Sulfa (sulfonamide antibiotics) Nausea And Vomiting     Other reaction(s): Rash    Unable to assess      MEDICINES CONTAINING ASPIRIN AFFECT HER STOMACH/STOMACH ULCER    Codeine Rash    Penicillins Nausea And Vomiting and Rash     Current Outpatient Medications   Medication Sig Dispense Refill    ALPRAZolam (XANAX) 0.5 MG tablet TAKE ONE TABLET BY MOUTH AS DIRECTED AS NEEDED 30 tablet 2    atenoloL  "(TENORMIN) 100 MG tablet Take 1 tablet (100 mg total) by mouth once daily. 90 tablet 3    diclofenac sodium (VOLTAREN) 1 % Gel APPLY TWO GRAMS TOPICALLY FOUR TIMES DAILY 300 g 2    multivitamin (ONE DAILY MULTIVITAMIN) per tablet Take 1 tablet by mouth once daily.      omeprazole (PRILOSEC) 40 MG capsule TAKE ONE CAPSULE BY MOUTH ONCE DAILY 90 capsule 2    atenoloL-chlorthalidone (TENORETIC) 100-25 mg per tablet       gabapentin (NEURONTIN) 600 MG tablet TAKE 1 TABLET BY MOUTH 4 TIMES DAILY. 120 tablet 1    propranolol (INDERAL) 20 MG tablet Take 1 tablet (20 mg total) by mouth 3 (three) times daily. 90 tablet 0     No current facility-administered medications for this visit.          ROS  Review of Systems   Constitutional: Negative for activity change, appetite change, fatigue, fever and unexpected weight change.   HENT: Negative.  Negative for ear discharge, ear pain, rhinorrhea and sore throat.    Eyes: Negative.    Respiratory: Negative for apnea, cough, chest tightness, shortness of breath and wheezing.    Cardiovascular: Negative for chest pain, palpitations and leg swelling.   Gastrointestinal: Negative for abdominal distention, abdominal pain, constipation, diarrhea and vomiting.   Endocrine: Negative for cold intolerance, heat intolerance, polydipsia and polyuria.   Genitourinary: Negative for decreased urine volume and urgency.   Musculoskeletal: Negative.    Skin: Negative for rash.   Neurological: Negative for dizziness and headaches.   Hematological: Does not bruise/bleed easily.   Psychiatric/Behavioral: Negative for agitation, sleep disturbance and suicidal ideas.           Physical Exam  Vitals:    12/01/20 1515   BP: 124/80   BP Location: Right arm   Patient Position: Sitting   BP Method: Large (Automatic)   Pulse: 60   Temp: 98.4 °F (36.9 °C)   TempSrc: Oral   SpO2: 99%   Weight: 76.7 kg (169 lb)   Height: 5' 4" (1.626 m)    Body mass index is 29.01 kg/m².  Weight: 76.7 kg (169 lb)   Height: " "5' 4" (162.6 cm)   Physical Exam  Vitals signs reviewed.   Constitutional:       Appearance: She is well-developed.   HENT:      Head: Normocephalic and atraumatic.      Right Ear: External ear normal.      Left Ear: External ear normal.      Nose: Nose normal.   Eyes:      Conjunctiva/sclera: Conjunctivae normal.      Pupils: Pupils are equal, round, and reactive to light.   Cardiovascular:      Rate and Rhythm: Normal rate and regular rhythm.      Heart sounds: Normal heart sounds.   Pulmonary:      Effort: Pulmonary effort is normal.      Breath sounds: Normal breath sounds.   Skin:     General: Skin is warm and dry.   Neurological:      Mental Status: She is alert and oriented to person, place, and time.         Health Maintenance       Date Due Completion Date    HIV Screening [birth year]/06[birth year]1980 ---    Cervical Cancer Screening 03/07/2021 3/7/2018    Mammogram 02/24/2022 2/24/2020    Lipid Panel 06/08/2023 6/8/2018    Colorectal Cancer Screening 07/12/2023 7/12/2013 (Done)    Override on 7/12/2013: Done    TETANUS VACCINE 05/28/2030 5/28/2020              Patient note was created using [employer].  Any errors in syntax or even information may not have been identified and edited on initial review prior to signing this note.    "

## 2021-01-12 ENCOUNTER — PATIENT MESSAGE (OUTPATIENT)
Dept: FAMILY MEDICINE | Facility: CLINIC | Age: 56
End: 2021-01-12

## 2021-01-12 DIAGNOSIS — Z12.31 ENCOUNTER FOR SCREENING MAMMOGRAM FOR BREAST CANCER: Primary | ICD-10-CM

## 2021-01-19 ENCOUNTER — TELEPHONE (OUTPATIENT)
Dept: FAMILY MEDICINE | Facility: CLINIC | Age: 56
End: 2021-01-19

## 2021-01-19 DIAGNOSIS — Z20.822 EXPOSURE TO COVID-19 VIRUS: Primary | ICD-10-CM

## 2021-02-26 ENCOUNTER — HOSPITAL ENCOUNTER (OUTPATIENT)
Dept: RADIOLOGY | Facility: HOSPITAL | Age: 56
Discharge: HOME | End: 2021-02-26
Attending: FAMILY MEDICINE
Payer: COMMERCIAL

## 2021-02-26 DIAGNOSIS — Z12.31 ENCOUNTER FOR SCREENING MAMMOGRAM FOR BREAST CANCER: ICD-10-CM

## 2021-02-26 PROCEDURE — 77063 BREAST TOMOSYNTHESIS BI: CPT | Mod: 26,,, | Performed by: RADIOLOGY

## 2021-02-26 PROCEDURE — 77067 SCR MAMMO BI INCL CAD: CPT | Mod: TC,PO

## 2021-02-26 PROCEDURE — 77067 SCR MAMMO BI INCL CAD: CPT | Mod: 26,,, | Performed by: RADIOLOGY

## 2021-02-26 PROCEDURE — 77063 MAMMO DIGITAL SCREENING BILAT WITH TOMO: ICD-10-PCS | Mod: 26,,, | Performed by: RADIOLOGY

## 2021-02-26 PROCEDURE — 77067 MAMMO DIGITAL SCREENING BILAT WITH TOMO: ICD-10-PCS | Mod: 26,,, | Performed by: RADIOLOGY

## 2021-04-13 ENCOUNTER — PATIENT OUTREACH (OUTPATIENT)
Dept: ADMINISTRATIVE | Facility: HOSPITAL | Age: 56
End: 2021-04-13

## 2021-04-13 DIAGNOSIS — E78.01 FAMILIAL HYPERCHOLESTEROLEMIA: Primary | ICD-10-CM

## 2021-04-22 ENCOUNTER — LAB VISIT (OUTPATIENT)
Dept: LAB | Facility: HOSPITAL | Age: 56
End: 2021-04-22
Attending: FAMILY MEDICINE
Payer: COMMERCIAL

## 2021-04-22 DIAGNOSIS — E78.01 FAMILIAL HYPERCHOLESTEROLEMIA: ICD-10-CM

## 2021-04-22 LAB
CHOLEST SERPL-MCNC: 259 MG/DL (ref 120–199)
CHOLEST/HDLC SERPL: 4 {RATIO} (ref 2–5)
HDLC SERPL-MCNC: 65 MG/DL (ref 40–75)
HDLC SERPL: 25.1 % (ref 20–50)
LDLC SERPL CALC-MCNC: 169.8 MG/DL (ref 63–159)
NONHDLC SERPL-MCNC: 194 MG/DL
TRIGL SERPL-MCNC: 121 MG/DL (ref 30–150)

## 2021-04-22 PROCEDURE — 80061 LIPID PANEL: CPT | Performed by: FAMILY MEDICINE

## 2021-04-22 PROCEDURE — 36415 COLL VENOUS BLD VENIPUNCTURE: CPT | Mod: PO | Performed by: FAMILY MEDICINE

## 2021-04-27 ENCOUNTER — OFFICE VISIT (OUTPATIENT)
Dept: FAMILY MEDICINE | Facility: CLINIC | Age: 56
End: 2021-04-27
Payer: COMMERCIAL

## 2021-04-27 VITALS
OXYGEN SATURATION: 96 % | SYSTOLIC BLOOD PRESSURE: 100 MMHG | BODY MASS INDEX: 29.2 KG/M2 | DIASTOLIC BLOOD PRESSURE: 60 MMHG | HEIGHT: 64 IN | WEIGHT: 171.06 LBS | TEMPERATURE: 99 F | HEART RATE: 78 BPM

## 2021-04-27 DIAGNOSIS — Z78.0 MENOPAUSE: ICD-10-CM

## 2021-04-27 DIAGNOSIS — I83.90 VARICOSE VEINS OF LOWER EXTREMITY, UNSPECIFIED LATERALITY, UNSPECIFIED WHETHER COMPLICATED: Primary | ICD-10-CM

## 2021-04-27 DIAGNOSIS — E78.5 HYPERLIPIDEMIA, UNSPECIFIED HYPERLIPIDEMIA TYPE: ICD-10-CM

## 2021-04-27 DIAGNOSIS — R60.0 LOCALIZED EDEMA: ICD-10-CM

## 2021-04-27 PROCEDURE — 99999 PR PBB SHADOW E&M-EST. PATIENT-LVL V: CPT | Mod: PBBFAC,,, | Performed by: FAMILY MEDICINE

## 2021-04-27 PROCEDURE — 99214 OFFICE O/P EST MOD 30 MIN: CPT | Mod: S$GLB,,, | Performed by: FAMILY MEDICINE

## 2021-04-27 PROCEDURE — 99214 PR OFFICE/OUTPT VISIT, EST, LEVL IV, 30-39 MIN: ICD-10-PCS | Mod: S$GLB,,, | Performed by: FAMILY MEDICINE

## 2021-04-27 PROCEDURE — 99999 PR PBB SHADOW E&M-EST. PATIENT-LVL V: ICD-10-PCS | Mod: PBBFAC,,, | Performed by: FAMILY MEDICINE

## 2021-04-27 RX ORDER — ATORVASTATIN CALCIUM 10 MG/1
10 TABLET, FILM COATED ORAL DAILY
Qty: 90 TABLET | Refills: 3 | Status: SHIPPED | OUTPATIENT
Start: 2021-04-27 | End: 2022-02-04

## 2021-05-04 ENCOUNTER — PATIENT OUTREACH (OUTPATIENT)
Dept: ADMINISTRATIVE | Facility: HOSPITAL | Age: 56
End: 2021-05-04

## 2021-06-02 DIAGNOSIS — I10 ESSENTIAL HYPERTENSION: ICD-10-CM

## 2021-06-09 DIAGNOSIS — I10 ESSENTIAL HYPERTENSION: ICD-10-CM

## 2021-06-16 DIAGNOSIS — I10 ESSENTIAL HYPERTENSION: ICD-10-CM

## 2021-06-23 DIAGNOSIS — I10 ESSENTIAL HYPERTENSION: ICD-10-CM

## 2021-07-08 DIAGNOSIS — I10 ESSENTIAL HYPERTENSION: ICD-10-CM

## 2021-07-29 ENCOUNTER — PATIENT MESSAGE (OUTPATIENT)
Dept: FAMILY MEDICINE | Facility: CLINIC | Age: 56
End: 2021-07-29

## 2021-07-30 ENCOUNTER — IMMUNIZATION (OUTPATIENT)
Dept: PRIMARY CARE CLINIC | Facility: CLINIC | Age: 56
End: 2021-07-30
Payer: COMMERCIAL

## 2021-07-30 DIAGNOSIS — Z23 NEED FOR VACCINATION: Primary | ICD-10-CM

## 2021-07-30 PROCEDURE — 91300 COVID-19, MRNA, LNP-S, PF, 30 MCG/0.3 ML DOSE VACCINE: CPT | Mod: S$GLB,,, | Performed by: INTERNAL MEDICINE

## 2021-07-30 PROCEDURE — 0001A COVID-19, MRNA, LNP-S, PF, 30 MCG/0.3 ML DOSE VACCINE: CPT | Mod: CV19,S$GLB,, | Performed by: INTERNAL MEDICINE

## 2021-07-30 PROCEDURE — 0001A COVID-19, MRNA, LNP-S, PF, 30 MCG/0.3 ML DOSE VACCINE: ICD-10-PCS | Mod: CV19,S$GLB,, | Performed by: INTERNAL MEDICINE

## 2021-07-30 PROCEDURE — 91300 COVID-19, MRNA, LNP-S, PF, 30 MCG/0.3 ML DOSE VACCINE: ICD-10-PCS | Mod: S$GLB,,, | Performed by: INTERNAL MEDICINE

## 2021-08-20 ENCOUNTER — IMMUNIZATION (OUTPATIENT)
Dept: PRIMARY CARE CLINIC | Facility: CLINIC | Age: 56
End: 2021-08-20
Payer: COMMERCIAL

## 2021-08-20 DIAGNOSIS — Z23 NEED FOR VACCINATION: Primary | ICD-10-CM

## 2021-08-20 PROCEDURE — 0002A COVID-19, MRNA, LNP-S, PF, 30 MCG/0.3 ML DOSE VACCINE: CPT | Mod: CV19,S$GLB,, | Performed by: INTERNAL MEDICINE

## 2021-08-20 PROCEDURE — 91300 COVID-19, MRNA, LNP-S, PF, 30 MCG/0.3 ML DOSE VACCINE: ICD-10-PCS | Mod: S$GLB,,, | Performed by: INTERNAL MEDICINE

## 2021-08-20 PROCEDURE — 0002A COVID-19, MRNA, LNP-S, PF, 30 MCG/0.3 ML DOSE VACCINE: ICD-10-PCS | Mod: CV19,S$GLB,, | Performed by: INTERNAL MEDICINE

## 2021-08-20 PROCEDURE — 91300 COVID-19, MRNA, LNP-S, PF, 30 MCG/0.3 ML DOSE VACCINE: CPT | Mod: S$GLB,,, | Performed by: INTERNAL MEDICINE

## 2021-12-20 DIAGNOSIS — K21.9 GASTROESOPHAGEAL REFLUX DISEASE: ICD-10-CM

## 2021-12-23 DIAGNOSIS — K21.9 GASTROESOPHAGEAL REFLUX DISEASE: ICD-10-CM

## 2021-12-23 DIAGNOSIS — M54.50 CHRONIC LOW BACK PAIN: ICD-10-CM

## 2021-12-23 DIAGNOSIS — G89.29 CHRONIC LOW BACK PAIN: ICD-10-CM

## 2021-12-23 DIAGNOSIS — M77.12 LATERAL EPICONDYLITIS OF LEFT ELBOW: ICD-10-CM

## 2021-12-23 RX ORDER — DICLOFENAC SODIUM 10 MG/G
2 GEL TOPICAL 4 TIMES DAILY
Qty: 300 G | Refills: 2 | Status: CANCELLED | OUTPATIENT
Start: 2021-12-23

## 2021-12-27 RX ORDER — OMEPRAZOLE 40 MG/1
CAPSULE, DELAYED RELEASE ORAL
Qty: 90 CAPSULE | Refills: 1 | OUTPATIENT
Start: 2021-12-27

## 2021-12-27 RX ORDER — OMEPRAZOLE 40 MG/1
40 CAPSULE, DELAYED RELEASE ORAL DAILY
Qty: 90 CAPSULE | Refills: 1 | Status: SHIPPED | OUTPATIENT
Start: 2021-12-27 | End: 2022-07-28

## 2022-01-04 ENCOUNTER — PATIENT OUTREACH (OUTPATIENT)
Dept: ADMINISTRATIVE | Facility: OTHER | Age: 57
End: 2022-01-04
Payer: COMMERCIAL

## 2022-01-04 NOTE — PROGRESS NOTES
Health Maintenance Due   Topic Date Due    HIV Screening  Never done    Influenza Vaccine (1) 09/01/2021    Mammogram  02/26/2022     Updates were requested from care everywhere.  Chart was reviewed for overdue Proactive [facility] Encounters (HANNY) topics (CRS, Breast Cancer Screening, Eye exam)  Health Maintenance has been updated.  LINKS immunization registry triggered.  Immunizations were reconciled.

## 2022-01-06 ENCOUNTER — OFFICE VISIT (OUTPATIENT)
Dept: PHYSICAL MEDICINE AND REHAB | Facility: CLINIC | Age: 57
End: 2022-01-06
Payer: COMMERCIAL

## 2022-01-06 VITALS
BODY MASS INDEX: 30.22 KG/M2 | WEIGHT: 177 LBS | SYSTOLIC BLOOD PRESSURE: 135 MMHG | HEIGHT: 64 IN | HEART RATE: 58 BPM | DIASTOLIC BLOOD PRESSURE: 83 MMHG

## 2022-01-06 DIAGNOSIS — M54.16 RIGHT LUMBAR RADICULOPATHY: ICD-10-CM

## 2022-01-06 DIAGNOSIS — M48.061 NEURAL FORAMINAL STENOSIS OF LUMBAR SPINE: ICD-10-CM

## 2022-01-06 DIAGNOSIS — G89.29 CHRONIC MIDLINE LOW BACK PAIN WITH RIGHT-SIDED SCIATICA: Primary | ICD-10-CM

## 2022-01-06 DIAGNOSIS — M54.41 CHRONIC MIDLINE LOW BACK PAIN WITH RIGHT-SIDED SCIATICA: Primary | ICD-10-CM

## 2022-01-06 DIAGNOSIS — M51.36 DDD (DEGENERATIVE DISC DISEASE), LUMBAR: ICD-10-CM

## 2022-01-06 DIAGNOSIS — M77.11 LATERAL EPICONDYLITIS OF RIGHT ELBOW: ICD-10-CM

## 2022-01-06 PROCEDURE — 3079F PR MOST RECENT DIASTOLIC BLOOD PRESSURE 80-89 MM HG: ICD-10-PCS | Mod: CPTII,S$GLB,, | Performed by: PHYSICAL MEDICINE & REHABILITATION

## 2022-01-06 PROCEDURE — 99999 PR PBB SHADOW E&M-EST. PATIENT-LVL II: ICD-10-PCS | Mod: PBBFAC,,, | Performed by: PHYSICAL MEDICINE & REHABILITATION

## 2022-01-06 PROCEDURE — 99204 OFFICE O/P NEW MOD 45 MIN: CPT | Mod: S$GLB,,, | Performed by: PHYSICAL MEDICINE & REHABILITATION

## 2022-01-06 PROCEDURE — 3008F PR BODY MASS INDEX (BMI) DOCUMENTED: ICD-10-PCS | Mod: CPTII,S$GLB,, | Performed by: PHYSICAL MEDICINE & REHABILITATION

## 2022-01-06 PROCEDURE — 3075F PR MOST RECENT SYSTOLIC BLOOD PRESS GE 130-139MM HG: ICD-10-PCS | Mod: CPTII,S$GLB,, | Performed by: PHYSICAL MEDICINE & REHABILITATION

## 2022-01-06 PROCEDURE — 99204 PR OFFICE/OUTPT VISIT, NEW, LEVL IV, 45-59 MIN: ICD-10-PCS | Mod: S$GLB,,, | Performed by: PHYSICAL MEDICINE & REHABILITATION

## 2022-01-06 PROCEDURE — 3079F DIAST BP 80-89 MM HG: CPT | Mod: CPTII,S$GLB,, | Performed by: PHYSICAL MEDICINE & REHABILITATION

## 2022-01-06 PROCEDURE — 3075F SYST BP GE 130 - 139MM HG: CPT | Mod: CPTII,S$GLB,, | Performed by: PHYSICAL MEDICINE & REHABILITATION

## 2022-01-06 PROCEDURE — 99999 PR PBB SHADOW E&M-EST. PATIENT-LVL II: CPT | Mod: PBBFAC,,, | Performed by: PHYSICAL MEDICINE & REHABILITATION

## 2022-01-06 PROCEDURE — 3008F BODY MASS INDEX DOCD: CPT | Mod: CPTII,S$GLB,, | Performed by: PHYSICAL MEDICINE & REHABILITATION

## 2022-01-06 RX ORDER — ACETAMINOPHEN 500 MG
500-1000 TABLET ORAL 3 TIMES DAILY PRN
Refills: 0 | COMMUNITY
Start: 2022-01-06

## 2022-01-06 RX ORDER — DICLOFENAC SODIUM 10 MG/G
2 GEL TOPICAL 4 TIMES DAILY PRN
Qty: 300 G | Refills: 2 | Status: SHIPPED | OUTPATIENT
Start: 2022-01-06

## 2022-01-06 RX ORDER — GABAPENTIN 600 MG/1
600 TABLET ORAL SEE ADMIN INSTRUCTIONS
Qty: 360 TABLET | Refills: 1 | Status: SHIPPED | OUTPATIENT
Start: 2022-01-06 | End: 2022-07-15

## 2022-01-06 NOTE — PROGRESS NOTES
Subjective:       Patient ID: Brianna Coughlin is a 56 y.o. female.    Chief Complaint: No chief complaint on file.    HPI     HISTORY OF PRESENT ILLNESS:  The patient is a 56-year-old white female who was followed up in the Physical Medicine Clinic for chronic low back pain with right lumbar radiculopathy.  Her MRI of the lumbar spine was positive for degenerative disc disease with mild protrusion at L4-5.  Her last visit was on 02/16/2017.  She was maintained on gabapentin and p.r.n. Tylenol.    The patient was lost to follow-up.  She is coming to the clinic to reestablish care for her back pain.  Her low back pain has been recently stable with occasional flare ups. It is an intermittent aching and soreness pain in the lumbar spine and across her back.  She has occasional radiation to the right foot with numbness. She has infrequent radiation to left knee.  Her pain is worse with activity and better with rest.  Her maximum pain is 5-6/10 and minimum 0/10.  Today, it is 0/10.  The patient denies any lower extremity weakness.  She denies any bowel or bladder incontinence.  She denies any leg claudications.    She has been complaining recently of pain in the right lateral epicondyle region.  She has been using an elbow brace and p.r.n. OTC naproxen with some relief.    She is currently taking:  - gabapentin 600 mg p.o., 1 tablet qam and qpm, 2 qhs..    - Tylenol 500 mg p.r.n., usually a couple of times per month.    - diclofenac gel p.r.n.    In the past, she was on Cymbalta, but had to stop it also secondary to mood changes.      Past Medical History:   Diagnosis Date    Abnormal Pap smear     Anxiety     Breast cyst     Celiac disease     Dyspepsia     Dysplasia of cervix     Fibroids     GERD (gastroesophageal reflux disease)     Hypertension     Ovarian cyst        Review of patient's allergies indicates:   Allergen Reactions    Acetaminophen-codeine Other (See Comments)     Stomach pains     Iodinated contrast media Anaphylaxis    Mobic [meloxicam] Other (See Comments)     STOMACH ULCERS    Iodine Other (See Comments)    Penicillin Hives    Sulfa (sulfonamide antibiotics) Nausea And Vomiting     Other reaction(s): Rash    Unable to assess      MEDICINES CONTAINING ASPIRIN AFFECT HER STOMACH/STOMACH ULCER    Codeine Rash    Penicillins Nausea And Vomiting and Rash             Review of Systems   Constitutional: Negative for fatigue.   Respiratory: Negative for shortness of breath.    Cardiovascular: Negative for chest pain.   Gastrointestinal: Negative for blood in stool, constipation, nausea and vomiting.   Genitourinary: Positive for difficulty urinating.   Musculoskeletal: Positive for back pain. Negative for arthralgias, gait problem and neck pain.   Skin: Negative for rash.   Neurological: Negative for dizziness and headaches.   Psychiatric/Behavioral: Positive for sleep disturbance. Negative for behavioral problems.       Objective:      Physical Exam  Vitals reviewed.   Constitutional:       General: She is not in acute distress.     Appearance: She is well-developed.   HENT:      Head: Normocephalic and atraumatic.   Musculoskeletal:      Cervical back: Normal range of motion.      Comments: Tenderness lateral epicondyle.  Rt elbow: +ve.  Pain with resisted wrist extension:  Rt elbow: +ve.      BLE:  ROM:full.  - ve bilateral knee crepitus.   Strength:    RLE: 5/5 at hip flexion, 5 knee extension, 5 ankle DF, 5 PF.   LLE: 5/5 at hip flexion, 5 knee extension, 5 ankle DF, 5 PF.  Sensation to pinprick:     RLE: intact.      LLE: intact.   DTR:     RLE: +2 knee, +2 ankle.    LLE: +2 knee, +2 ankle.  Clonus:    Rt ankle: -ve.    Lt ankle: -ve.  SLR:      RLE: -ve at 70 deg.      LLE: -ve at 70 deg.     Mild tenderness over lumbar spine.      Spine flexion: 90 degrees with mild pain.  Spine extension: 30 degrees with no pain.  Lateral bending: no pain to Right, no pain to Left.    Heel walking:  WNL.  Toe walking: WNL.  Gait: WNL.   Skin:     General: Skin is warm.   Neurological:      Mental Status: She is alert.                 Assessment:       1. Chronic midline low back pain with right-sided sciatica    2. DDD (degenerative disc disease), lumbar    3. Neural foraminal stenosis of lumbar spine (left L4-5)    4. Right lumbar radiculopathy    5. Lateral epicondylitis of right elbow        Plan:       - Continue acetaminophen (TYLENOL) 650 MG tablet; Take 1 tablets (350 mg total) by mouth 3-4 times daily as needed for Pain.  - Continue OTC naproxen 220 mg 1-2 p.o. b.i.d. p.r.n..  She was asked to be cautious for any GI side effects and to stop it accordingly.  - Continue gabapentin (NEURONTIN) 600 MG tablet; Take 1 tablet (600 mg total) by mouth qam, 1 qpm, 2 qhs.  - Continue diclofenac 1% gel to the right lateral epicondylar region 3-4 times per day as needed.  - Isometric exercises for right wrist extensors were encouraged.  If no relief, she may be referred to the [facility] for steroid injections.  - Regular home exercise program was encouraged.  - Follow up in about 6 months (around 7/6/2022).      This was a 45 minute visit, 50% of which was spent educating the patient about the diagnosis and the treatment plan.    This note was partly generated with [employer] voice recognition software. I apologize for any possible typographical errors.

## 2022-01-26 ENCOUNTER — PATIENT MESSAGE (OUTPATIENT)
Dept: FAMILY MEDICINE | Facility: CLINIC | Age: 57
End: 2022-01-26
Payer: COMMERCIAL

## 2022-01-26 DIAGNOSIS — Z12.31 BREAST CANCER SCREENING BY MAMMOGRAM: Primary | ICD-10-CM

## 2022-02-14 ENCOUNTER — IMMUNIZATION (OUTPATIENT)
Dept: PRIMARY CARE CLINIC | Facility: CLINIC | Age: 57
End: 2022-02-14
Payer: COMMERCIAL

## 2022-02-14 DIAGNOSIS — Z23 NEED FOR VACCINATION: Primary | ICD-10-CM

## 2022-02-14 PROCEDURE — 91305 COVID-19, MRNA, LNP-S, PF, 30 MCG/0.3 ML DOSE VACCINE (PFIZER): CPT | Mod: PBBFAC | Performed by: INTERNAL MEDICINE

## 2022-02-28 ENCOUNTER — HOSPITAL ENCOUNTER (OUTPATIENT)
Dept: RADIOLOGY | Facility: HOSPITAL | Age: 57
Discharge: HOME | End: 2022-02-28
Attending: FAMILY MEDICINE
Payer: COMMERCIAL

## 2022-02-28 VITALS — HEIGHT: 64 IN | BODY MASS INDEX: 30.22 KG/M2 | WEIGHT: 177 LBS

## 2022-02-28 DIAGNOSIS — Z12.31 BREAST CANCER SCREENING BY MAMMOGRAM: ICD-10-CM

## 2022-02-28 PROCEDURE — 77063 BREAST TOMOSYNTHESIS BI: CPT | Mod: TC,PO

## 2022-02-28 PROCEDURE — 77063 MAMMO DIGITAL SCREENING BILAT WITH TOMO: ICD-10-PCS | Mod: 26,,, | Performed by: RADIOLOGY

## 2022-02-28 PROCEDURE — 77067 SCR MAMMO BI INCL CAD: CPT | Mod: 26,,, | Performed by: RADIOLOGY

## 2022-02-28 PROCEDURE — 77067 SCR MAMMO BI INCL CAD: CPT | Mod: TC,PO

## 2022-02-28 PROCEDURE — 77067 MAMMO DIGITAL SCREENING BILAT WITH TOMO: ICD-10-PCS | Mod: 26,,, | Performed by: RADIOLOGY

## 2022-02-28 PROCEDURE — 77063 BREAST TOMOSYNTHESIS BI: CPT | Mod: 26,,, | Performed by: RADIOLOGY

## 2022-03-28 ENCOUNTER — HOSPITAL ENCOUNTER (OUTPATIENT)
Dept: RADIOLOGY | Facility: HOSPITAL | Age: 57
Discharge: HOME | End: 2022-03-28
Attending: FAMILY MEDICINE
Payer: COMMERCIAL

## 2022-03-28 DIAGNOSIS — R92.8 ABNORMAL MAMMOGRAM OF RIGHT BREAST: ICD-10-CM

## 2022-03-28 PROCEDURE — 77065 DX MAMMO INCL CAD UNI: CPT | Mod: 26,RT,, | Performed by: RADIOLOGY

## 2022-03-28 PROCEDURE — 76642 US BREAST RIGHT LIMITED: ICD-10-PCS | Mod: 26,RT,, | Performed by: RADIOLOGY

## 2022-03-28 PROCEDURE — 77065 MAMMO DIGITAL DIAGNOSTIC RIGHT WITH TOMO: ICD-10-PCS | Mod: 26,RT,, | Performed by: RADIOLOGY

## 2022-03-28 PROCEDURE — 76642 ULTRASOUND BREAST LIMITED: CPT | Mod: 26,RT,, | Performed by: RADIOLOGY

## 2022-03-28 PROCEDURE — 77061 BREAST TOMOSYNTHESIS UNI: CPT | Mod: 26,RT,, | Performed by: RADIOLOGY

## 2022-03-28 PROCEDURE — 76642 ULTRASOUND BREAST LIMITED: CPT | Mod: TC,RT

## 2022-03-28 PROCEDURE — 77061 MAMMO DIGITAL DIAGNOSTIC RIGHT WITH TOMO: ICD-10-PCS | Mod: 26,RT,, | Performed by: RADIOLOGY

## 2022-03-28 PROCEDURE — 77065 DX MAMMO INCL CAD UNI: CPT | Mod: TC,RT

## 2022-06-01 ENCOUNTER — PATIENT MESSAGE (OUTPATIENT)
Dept: ADMINISTRATIVE | Facility: HOSPITAL | Age: 57
End: 2022-06-01
Payer: COMMERCIAL

## 2022-07-14 ENCOUNTER — PATIENT MESSAGE (OUTPATIENT)
Dept: FAMILY MEDICINE | Facility: CLINIC | Age: 57
End: 2022-07-14
Payer: COMMERCIAL

## 2022-08-12 ENCOUNTER — OFFICE VISIT (OUTPATIENT)
Dept: FAMILY MEDICINE | Facility: CLINIC | Age: 57
End: 2022-08-12
Payer: COMMERCIAL

## 2022-08-12 ENCOUNTER — LAB VISIT (OUTPATIENT)
Dept: LAB | Facility: HOSPITAL | Age: 57
End: 2022-08-12
Attending: FAMILY MEDICINE
Payer: COMMERCIAL

## 2022-08-12 VITALS
OXYGEN SATURATION: 97 % | HEART RATE: 66 BPM | SYSTOLIC BLOOD PRESSURE: 122 MMHG | BODY MASS INDEX: 30.07 KG/M2 | WEIGHT: 176.13 LBS | TEMPERATURE: 97 F | DIASTOLIC BLOOD PRESSURE: 82 MMHG | HEIGHT: 64 IN

## 2022-08-12 DIAGNOSIS — I10 ESSENTIAL HYPERTENSION: ICD-10-CM

## 2022-08-12 DIAGNOSIS — Z00.00 ANNUAL PHYSICAL EXAM: Primary | ICD-10-CM

## 2022-08-12 DIAGNOSIS — M79.7 FIBROMYALGIA: ICD-10-CM

## 2022-08-12 DIAGNOSIS — E78.01 FAMILIAL HYPERCHOLESTEROLEMIA: ICD-10-CM

## 2022-08-12 DIAGNOSIS — Z00.00 ANNUAL PHYSICAL EXAM: ICD-10-CM

## 2022-08-12 DIAGNOSIS — Z12.11 COLON CANCER SCREENING: ICD-10-CM

## 2022-08-12 DIAGNOSIS — D21.9 FIBROIDS: ICD-10-CM

## 2022-08-12 LAB
ALBUMIN SERPL BCP-MCNC: 4 G/DL (ref 3.5–5.2)
ALP SERPL-CCNC: 67 U/L (ref 55–135)
ALT SERPL W/O P-5'-P-CCNC: 19 U/L (ref 10–44)
ANION GAP SERPL CALC-SCNC: 10 MMOL/L (ref 8–16)
AST SERPL-CCNC: 16 U/L (ref 10–40)
BASOPHILS # BLD AUTO: 0.05 K/UL (ref 0–0.2)
BASOPHILS NFR BLD: 0.7 % (ref 0–1.9)
BILIRUB SERPL-MCNC: 0.7 MG/DL (ref 0.1–1)
BUN SERPL-MCNC: 14 MG/DL (ref 6–20)
CALCIUM SERPL-MCNC: 9.9 MG/DL (ref 8.7–10.5)
CHLORIDE SERPL-SCNC: 104 MMOL/L (ref 95–110)
CHOLEST SERPL-MCNC: 190 MG/DL (ref 120–199)
CHOLEST/HDLC SERPL: 2.5 {RATIO} (ref 2–5)
CO2 SERPL-SCNC: 29 MMOL/L (ref 23–29)
CREAT SERPL-MCNC: 0.8 MG/DL (ref 0.5–1.4)
DIFFERENTIAL METHOD: NORMAL
EOSINOPHIL # BLD AUTO: 0.3 K/UL (ref 0–0.5)
EOSINOPHIL NFR BLD: 4 % (ref 0–8)
ERYTHROCYTE [DISTWIDTH] IN BLOOD BY AUTOMATED COUNT: 13.2 % (ref 11.5–14.5)
EST. GFR  (NO RACE VARIABLE): >60 ML/MIN/1.73 M^2
ESTIMATED AVG GLUCOSE: 108 MG/DL (ref 68–131)
GLUCOSE SERPL-MCNC: 94 MG/DL (ref 70–110)
HBA1C MFR BLD: 5.4 % (ref 4–5.6)
HCT VFR BLD AUTO: 43.7 % (ref 37–48.5)
HDLC SERPL-MCNC: 76 MG/DL (ref 40–75)
HDLC SERPL: 40 % (ref 20–50)
HGB BLD-MCNC: 14.1 G/DL (ref 12–16)
IMM GRANULOCYTES # BLD AUTO: 0.02 K/UL (ref 0–0.04)
IMM GRANULOCYTES NFR BLD AUTO: 0.3 % (ref 0–0.5)
LDLC SERPL CALC-MCNC: 99.2 MG/DL (ref 63–159)
LYMPHOCYTES # BLD AUTO: 2.1 K/UL (ref 1–4.8)
LYMPHOCYTES NFR BLD: 31.7 % (ref 18–48)
MCH RBC QN AUTO: 29.8 PG (ref 27–31)
MCHC RBC AUTO-ENTMCNC: 32.3 G/DL (ref 32–36)
MCV RBC AUTO: 92 FL (ref 82–98)
MONOCYTES # BLD AUTO: 0.7 K/UL (ref 0.3–1)
MONOCYTES NFR BLD: 11.1 % (ref 4–15)
NEUTROPHILS # BLD AUTO: 3.5 K/UL (ref 1.8–7.7)
NEUTROPHILS NFR BLD: 52.2 % (ref 38–73)
NONHDLC SERPL-MCNC: 114 MG/DL
NRBC BLD-RTO: 0 /100 WBC
PLATELET # BLD AUTO: 293 K/UL (ref 150–450)
PMV BLD AUTO: 10.3 FL (ref 9.2–12.9)
POTASSIUM SERPL-SCNC: 4.5 MMOL/L (ref 3.5–5.1)
PROT SERPL-MCNC: 7.4 G/DL (ref 6–8.4)
RBC # BLD AUTO: 4.73 M/UL (ref 4–5.4)
SODIUM SERPL-SCNC: 143 MMOL/L (ref 136–145)
TRIGL SERPL-MCNC: 74 MG/DL (ref 30–150)
TSH SERPL DL<=0.005 MIU/L-ACNC: 1.13 UIU/ML (ref 0.4–4)
WBC # BLD AUTO: 6.69 K/UL (ref 3.9–12.7)

## 2022-08-12 PROCEDURE — 3079F DIAST BP 80-89 MM HG: CPT | Mod: CPTII,S$GLB,, | Performed by: FAMILY MEDICINE

## 2022-08-12 PROCEDURE — 1160F RVW MEDS BY RX/DR IN RCRD: CPT | Mod: CPTII,S$GLB,, | Performed by: FAMILY MEDICINE

## 2022-08-12 PROCEDURE — 3079F PR MOST RECENT DIASTOLIC BLOOD PRESSURE 80-89 MM HG: ICD-10-PCS | Mod: CPTII,S$GLB,, | Performed by: FAMILY MEDICINE

## 2022-08-12 PROCEDURE — 3074F SYST BP LT 130 MM HG: CPT | Mod: CPTII,S$GLB,, | Performed by: FAMILY MEDICINE

## 2022-08-12 PROCEDURE — 99396 PR PREVENTIVE VISIT,EST,40-64: ICD-10-PCS | Mod: S$GLB,,, | Performed by: FAMILY MEDICINE

## 2022-08-12 PROCEDURE — 80053 COMPREHEN METABOLIC PANEL: CPT | Performed by: FAMILY MEDICINE

## 2022-08-12 PROCEDURE — 3008F PR BODY MASS INDEX (BMI) DOCUMENTED: ICD-10-PCS | Mod: CPTII,S$GLB,, | Performed by: FAMILY MEDICINE

## 2022-08-12 PROCEDURE — 83036 HEMOGLOBIN GLYCOSYLATED A1C: CPT | Performed by: FAMILY MEDICINE

## 2022-08-12 PROCEDURE — 1159F MED LIST DOCD IN RCRD: CPT | Mod: CPTII,S$GLB,, | Performed by: FAMILY MEDICINE

## 2022-08-12 PROCEDURE — 87389 HIV-1 AG W/HIV-1&-2 AB AG IA: CPT | Performed by: FAMILY MEDICINE

## 2022-08-12 PROCEDURE — 99999 PR PBB SHADOW E&M-EST. PATIENT-LVL IV: ICD-10-PCS | Mod: PBBFAC,,, | Performed by: FAMILY MEDICINE

## 2022-08-12 PROCEDURE — 84443 ASSAY THYROID STIM HORMONE: CPT | Performed by: FAMILY MEDICINE

## 2022-08-12 PROCEDURE — 3074F PR MOST RECENT SYSTOLIC BLOOD PRESSURE < 130 MM HG: ICD-10-PCS | Mod: CPTII,S$GLB,, | Performed by: FAMILY MEDICINE

## 2022-08-12 PROCEDURE — 36415 COLL VENOUS BLD VENIPUNCTURE: CPT | Mod: PO | Performed by: FAMILY MEDICINE

## 2022-08-12 PROCEDURE — 85025 COMPLETE CBC W/AUTO DIFF WBC: CPT | Performed by: FAMILY MEDICINE

## 2022-08-12 PROCEDURE — 80061 LIPID PANEL: CPT | Performed by: FAMILY MEDICINE

## 2022-08-12 PROCEDURE — 99396 PREV VISIT EST AGE 40-64: CPT | Mod: S$GLB,,, | Performed by: FAMILY MEDICINE

## 2022-08-12 PROCEDURE — 1160F PR REVIEW ALL MEDS BY PRESCRIBER/CLIN PHARMACIST DOCUMENTED: ICD-10-PCS | Mod: CPTII,S$GLB,, | Performed by: FAMILY MEDICINE

## 2022-08-12 PROCEDURE — 3008F BODY MASS INDEX DOCD: CPT | Mod: CPTII,S$GLB,, | Performed by: FAMILY MEDICINE

## 2022-08-12 PROCEDURE — 99999 PR PBB SHADOW E&M-EST. PATIENT-LVL IV: CPT | Mod: PBBFAC,,, | Performed by: FAMILY MEDICINE

## 2022-08-12 PROCEDURE — 1159F PR MEDICATION LIST DOCUMENTED IN MEDICAL RECORD: ICD-10-PCS | Mod: CPTII,S$GLB,, | Performed by: FAMILY MEDICINE

## 2022-08-12 NOTE — PROGRESS NOTES
Assessment & Plan  Problem List Items Addressed This Visit        Cardiac/Vascular    Familial hypercholesterolemia    Relevant Orders    HIV 1/2 Ag/Ab (4th Gen)    Lipid Panel    TSH    Hemoglobin A1C    Comprehensive Metabolic Panel    CBC Auto Differential    Essential hypertension    Relevant Orders    HIV 1/2 Ag/Ab (4th Gen)    Lipid Panel    TSH    Hemoglobin A1C    Comprehensive Metabolic Panel    CBC Auto Differential       Oncology    Fibroids       Orthopedic    Fibromyalgia      Other Visit Diagnoses     Annual physical exam    -  Primary    Relevant Orders    HIV 1/2 Ag/Ab (4th Gen)    Lipid Panel    TSH    Hemoglobin A1C    Comprehensive Metabolic Panel    CBC Auto Differential    Colon cancer screening        Relevant Orders    Ambulatory referral/consult to Endo Procedure       I addressed all major concerns as it related to health maintenance.  All were ordered and scheduled based on the patients wishes.  Any additional health maintenance will be readdressed at the next physical if declined or deferred by the patient.        Health Maintenance reviewed,.    Follow-up: No follow-ups on file.    ______________________________________________________________________    Chief Complaint  Chief Complaint   Patient presents with    Annual Exam       HPI  Brianna Coughlin is a 57 y.o. female with multiple medical diagnoses as listed in the medical history and problem list that presents for annual exam.  Pt is known to me with last appointment 4/27/2021.    Patient denies any new symptoms including chest pain, SOB, blurry vision, N/V, diarrhea.        PAST MEDICAL HISTORY:  Past Medical History:   Diagnosis Date    Abnormal Pap smear     Anxiety     Breast cyst     Celiac disease     Dyspepsia     Dysplasia of cervix     Fibroids     GERD (gastroesophageal reflux disease)     Hypertension     Ovarian cyst        PAST SURGICAL HISTORY:  Past Surgical History:   Procedure Laterality  Date    CHOLECYSTECTOMY      conization of cervix      ESOPHAGOGASTRODUODENOSCOPY N/A 8/6/2020    Procedure: EGD (ESOPHAGOGASTRODUODENOSCOPY);  Surgeon: Renard Gallegos MD;  Location: [address]);  Service: Endoscopy;  Laterality: N/A;    TUBAL LIGATION         SOCIAL HISTORY:  Social History     Socioeconomic History    Marital status: Single   Tobacco Use    Smoking status: Never Smoker    Smokeless tobacco: Never Used   Substance and Sexual Activity    Alcohol use: Yes     Alcohol/week: 0.0 standard drinks     Comment: socially    Drug use: No    Sexual activity: Not Currently     Partners: Male       FAMILY HISTORY:  Family History   Problem Relation Age of Onset    Hypertension Mother     Heart disease Mother     Heart disease Father     Stroke Maternal Grandfather        ALLERGIES AND MEDICATIONS: updated and reviewed.  Review of patient's allergies indicates:   Allergen Reactions    Acetaminophen-codeine Other (See Comments)     Stomach pains    Iodinated contrast media Anaphylaxis    Mobic [meloxicam] Other (See Comments)     STOMACH ULCERS    Iodine Other (See Comments)    Penicillin Hives    Sulfa (sulfonamide antibiotics) Nausea And Vomiting     Other reaction(s): Rash    Unable to assess      MEDICINES CONTAINING ASPIRIN AFFECT HER STOMACH/STOMACH ULCER    Codeine Rash    Penicillins Nausea And Vomiting and Rash     Current Outpatient Medications   Medication Sig Dispense Refill    acetaminophen (TYLENOL) 500 MG tablet Take 1-2 tablets (500-1,000 mg total) by mouth 3 (three) times daily as needed for Pain.  0    ALPRAZolam (XANAX) 0.5 MG tablet TAKE 1 TABLET BY MOUTH EVERY DAY AS NEEDED 30 tablet 0    atenoloL (TENORMIN) 100 MG tablet TAKE 1 TABLET BY MOUTH EVERY DAY 90 tablet 3    atorvastatin (LIPITOR) 10 MG tablet TAKE 1 TABLET BY MOUTH EVERY DAY 90 tablet 3    diclofenac sodium (VOLTAREN) 1 % Gel Apply 2 g topically 4 (four) times daily as needed (joint pain). 300 g 2  "   gabapentin (NEURONTIN) 600 MG tablet TAKE ONE TABLET EVERY MORNING AND AFTER NOON, AND TWO AT BEDTIME 360 tablet 1    multivitamin (THERAGRAN) per tablet Take 1 tablet by mouth once daily.      omeprazole (PRILOSEC) 40 MG capsule TAKE 1 CAPSULE BY MOUTH EVERY DAY 90 capsule 1     No current facility-administered medications for this visit.         ROS  Review of Systems   Constitutional: Negative for activity change and unexpected weight change.   HENT: Negative for hearing loss, rhinorrhea and trouble swallowing.    Eyes: Negative for discharge and visual disturbance.   Respiratory: Negative for chest tightness and wheezing.    Cardiovascular: Negative for chest pain and palpitations.   Gastrointestinal: Negative for blood in stool, constipation, diarrhea and vomiting.   Endocrine: Negative for polydipsia and polyuria.   Genitourinary: Negative for difficulty urinating, dysuria, hematuria and menstrual problem.   Musculoskeletal: Negative for arthralgias, joint swelling and neck pain.   Neurological: Negative for weakness and headaches.   Psychiatric/Behavioral: Negative for confusion and dysphoric mood.           Physical Exam  Vitals:    08/12/22 0702   BP: 122/82   Pulse: 66   Temp: 97.3 °F (36.3 °C)   TempSrc: Oral   SpO2: 97%   Weight: 79.9 kg (176 lb 2.4 oz)   Height: 5' 4" (1.626 m)    Body mass index is 30.24 kg/m².  Weight: 79.9 kg (176 lb 2.4 oz)   Height: 5' 4" (162.6 cm)   Physical Exam  Vitals reviewed.   Constitutional:       Appearance: She is well-developed.   HENT:      Head: Normocephalic and atraumatic.      Right Ear: External ear normal.      Left Ear: External ear normal.      Nose: Nose normal.   Eyes:      Conjunctiva/sclera: Conjunctivae normal.      Pupils: Pupils are equal, round, and reactive to light.   Cardiovascular:      Rate and Rhythm: Normal rate and regular rhythm.      Heart sounds: Normal heart sounds.   Pulmonary:      Effort: Pulmonary effort is normal.      Breath " sounds: Normal breath sounds.   Skin:     General: Skin is warm and dry.   Neurological:      Mental Status: She is alert and oriented to person, place, and time.           Health Maintenance       Date Due Completion Date    HIV Screening Never done ---    COVID-19 Vaccine (4 - Booster for Pfizer series) 06/14/2022 2/14/2022    Influenza Vaccine (1) 09/01/2022 11/5/2021    Override on 10/8/2019: Done (at her job)    Mammogram 03/28/2023 3/28/2022    Colorectal Cancer Screening 07/12/2023 7/12/2013 (Done)    Override on 7/12/2013: Done    Cervical Cancer Screening 06/17/2025 6/17/2020    Lipid Panel 04/22/2026 4/22/2021    TETANUS VACCINE 05/28/2030 5/28/2020              Patient note was created using [employer].  Any errors in syntax or even information may not have been identified and edited on initial review prior to signing this note.

## 2022-08-15 ENCOUNTER — PATIENT MESSAGE (OUTPATIENT)
Dept: FAMILY MEDICINE | Facility: CLINIC | Age: 57
End: 2022-08-15
Payer: COMMERCIAL

## 2022-08-15 LAB — HIV 1+2 AB+HIV1 P24 AG SERPL QL IA: NEGATIVE

## 2022-08-15 NOTE — PROGRESS NOTES
Your results look fine and do not require any change in treatment.     Please contact me if you have any additional concerns.    Sincerely,    Yas Lozoya

## 2022-08-19 ENCOUNTER — PATIENT MESSAGE (OUTPATIENT)
Dept: FAMILY MEDICINE | Facility: CLINIC | Age: 57
End: 2022-08-19
Payer: COMMERCIAL

## 2022-08-19 NOTE — TELEPHONE ENCOUNTER
Pt is having diarrhea a lot  and wants to know should she take anything for it because its often, coming out watery ,and mushy. also started a BRAT diet yesterday and had intestine cramps after eating, please advise

## 2022-09-05 ENCOUNTER — PATIENT MESSAGE (OUTPATIENT)
Dept: FAMILY MEDICINE | Facility: CLINIC | Age: 57
End: 2022-09-05
Payer: COMMERCIAL

## 2022-10-06 ENCOUNTER — CLINICAL SUPPORT (OUTPATIENT)
Dept: ENDOSCOPY | Facility: HOSPITAL | Age: 57
End: 2022-10-06
Attending: FAMILY MEDICINE
Payer: COMMERCIAL

## 2022-10-06 VITALS — HEIGHT: 64 IN | BODY MASS INDEX: 29.88 KG/M2 | WEIGHT: 175 LBS

## 2022-10-06 DIAGNOSIS — Z12.11 COLON CANCER SCREENING: ICD-10-CM

## 2022-10-06 RX ORDER — POLYETHYLENE GLYCOL 3350, SODIUM SULFATE ANHYDROUS, SODIUM BICARBONATE, SODIUM CHLORIDE, POTASSIUM CHLORIDE 236; 22.74; 6.74; 5.86; 2.97 G/4L; G/4L; G/4L; G/4L; G/4L
4 POWDER, FOR SOLUTION ORAL ONCE
Qty: 4000 ML | Refills: 0 | Status: SHIPPED | OUTPATIENT
Start: 2022-10-06 | End: 2022-10-06

## 2022-10-28 ENCOUNTER — PATIENT MESSAGE (OUTPATIENT)
Dept: FAMILY MEDICINE | Facility: CLINIC | Age: 57
End: 2022-10-28
Payer: COMMERCIAL

## 2022-11-22 ENCOUNTER — ANESTHESIA (OUTPATIENT)
Dept: ENDOSCOPY | Facility: HOSPITAL | Age: 57
End: 2022-11-22
Payer: COMMERCIAL

## 2022-11-22 ENCOUNTER — TELEPHONE (OUTPATIENT)
Dept: NEUROLOGY | Facility: CLINIC | Age: 57
End: 2022-11-22
Payer: COMMERCIAL

## 2022-11-22 ENCOUNTER — HOSPITAL ENCOUNTER (OUTPATIENT)
Facility: HOSPITAL | Age: 57
Discharge: HOME | End: 2022-11-22
Attending: STUDENT IN AN ORGANIZED HEALTH CARE EDUCATION/TRAINING PROGRAM | Admitting: STUDENT IN AN ORGANIZED HEALTH CARE EDUCATION/TRAINING PROGRAM
Payer: COMMERCIAL

## 2022-11-22 ENCOUNTER — OFFICE VISIT (OUTPATIENT)
Dept: URGENT CARE | Facility: CLINIC | Age: 57
End: 2022-11-22
Payer: COMMERCIAL

## 2022-11-22 ENCOUNTER — ANESTHESIA EVENT (OUTPATIENT)
Dept: ENDOSCOPY | Facility: HOSPITAL | Age: 57
End: 2022-11-22
Payer: COMMERCIAL

## 2022-11-22 VITALS
OXYGEN SATURATION: 99 % | TEMPERATURE: 98 F | BODY MASS INDEX: 29.88 KG/M2 | SYSTOLIC BLOOD PRESSURE: 133 MMHG | RESPIRATION RATE: 20 BRPM | WEIGHT: 175 LBS | HEIGHT: 64 IN | HEART RATE: 67 BPM | DIASTOLIC BLOOD PRESSURE: 68 MMHG

## 2022-11-22 VITALS
SYSTOLIC BLOOD PRESSURE: 156 MMHG | HEART RATE: 60 BPM | OXYGEN SATURATION: 98 % | WEIGHT: 175 LBS | TEMPERATURE: 98 F | RESPIRATION RATE: 14 BRPM | HEIGHT: 64 IN | BODY MASS INDEX: 29.88 KG/M2 | DIASTOLIC BLOOD PRESSURE: 92 MMHG

## 2022-11-22 DIAGNOSIS — Z12.11 COLON CANCER SCREENING: ICD-10-CM

## 2022-11-22 DIAGNOSIS — S89.91XA INJURY OF RIGHT KNEE, INITIAL ENCOUNTER: Primary | ICD-10-CM

## 2022-11-22 DIAGNOSIS — S83.91XA SPRAIN OF RIGHT KNEE, UNSPECIFIED LIGAMENT, INITIAL ENCOUNTER: ICD-10-CM

## 2022-11-22 PROCEDURE — E9220 PRA ENDO ANESTHESIA: ICD-10-PCS | Mod: ,,, | Performed by: NURSE ANESTHETIST, CERTIFIED REGISTERED

## 2022-11-22 PROCEDURE — 1159F MED LIST DOCD IN RCRD: CPT | Mod: CPTII,S$GLB,,

## 2022-11-22 PROCEDURE — 3044F PR MOST RECENT HEMOGLOBIN A1C LEVEL <7.0%: ICD-10-PCS | Mod: CPTII,S$GLB,,

## 2022-11-22 PROCEDURE — 25000003 PHARM REV CODE 250: Performed by: NURSE ANESTHETIST, CERTIFIED REGISTERED

## 2022-11-22 PROCEDURE — G0121 COLON CA SCRN NOT HI RSK IND: HCPCS | Performed by: STUDENT IN AN ORGANIZED HEALTH CARE EDUCATION/TRAINING PROGRAM

## 2022-11-22 PROCEDURE — 1160F PR REVIEW ALL MEDS BY PRESCRIBER/CLIN PHARMACIST DOCUMENTED: ICD-10-PCS | Mod: CPTII,S$GLB,,

## 2022-11-22 PROCEDURE — G0121 COLON CA SCRN NOT HI RSK IND: HCPCS | Mod: ,,, | Performed by: STUDENT IN AN ORGANIZED HEALTH CARE EDUCATION/TRAINING PROGRAM

## 2022-11-22 PROCEDURE — 96372 PR INJECTION,THERAP/PROPH/DIAG2ST, IM OR SUBCUT: ICD-10-PCS | Mod: S$GLB,,,

## 2022-11-22 PROCEDURE — 73564 XR KNEE COMP 4 OR MORE VIEWS RIGHT: ICD-10-PCS | Mod: RT,S$GLB,, | Performed by: RADIOLOGY

## 2022-11-22 PROCEDURE — 37000008 HC ANESTHESIA 1ST 15 MINUTES: Performed by: STUDENT IN AN ORGANIZED HEALTH CARE EDUCATION/TRAINING PROGRAM

## 2022-11-22 PROCEDURE — 3008F BODY MASS INDEX DOCD: CPT | Mod: CPTII,S$GLB,,

## 2022-11-22 PROCEDURE — 37000009 HC ANESTHESIA EA ADD 15 MINS: Performed by: STUDENT IN AN ORGANIZED HEALTH CARE EDUCATION/TRAINING PROGRAM

## 2022-11-22 PROCEDURE — 99214 OFFICE O/P EST MOD 30 MIN: CPT | Mod: 25,S$GLB,,

## 2022-11-22 PROCEDURE — 3080F PR MOST RECENT DIASTOLIC BLOOD PRESSURE >= 90 MM HG: ICD-10-PCS | Mod: CPTII,S$GLB,,

## 2022-11-22 PROCEDURE — 3008F PR BODY MASS INDEX (BMI) DOCUMENTED: ICD-10-PCS | Mod: CPTII,S$GLB,,

## 2022-11-22 PROCEDURE — 1160F RVW MEDS BY RX/DR IN RCRD: CPT | Mod: CPTII,S$GLB,,

## 2022-11-22 PROCEDURE — 3077F PR MOST RECENT SYSTOLIC BLOOD PRESSURE >= 140 MM HG: ICD-10-PCS | Mod: CPTII,S$GLB,,

## 2022-11-22 PROCEDURE — 3077F SYST BP >= 140 MM HG: CPT | Mod: CPTII,S$GLB,,

## 2022-11-22 PROCEDURE — G0121 COLON CA SCRN NOT HI RSK IND: ICD-10-PCS | Mod: ,,, | Performed by: STUDENT IN AN ORGANIZED HEALTH CARE EDUCATION/TRAINING PROGRAM

## 2022-11-22 PROCEDURE — E9220 PRA ENDO ANESTHESIA: HCPCS | Mod: ,,, | Performed by: NURSE ANESTHETIST, CERTIFIED REGISTERED

## 2022-11-22 PROCEDURE — 73564 X-RAY EXAM KNEE 4 OR MORE: CPT | Mod: RT,S$GLB,, | Performed by: RADIOLOGY

## 2022-11-22 PROCEDURE — 96372 THER/PROPH/DIAG INJ SC/IM: CPT | Mod: S$GLB,,,

## 2022-11-22 PROCEDURE — 3080F DIAST BP >= 90 MM HG: CPT | Mod: CPTII,S$GLB,,

## 2022-11-22 PROCEDURE — 99214 PR OFFICE/OUTPT VISIT, EST, LEVL IV, 30-39 MIN: ICD-10-PCS | Mod: 25,S$GLB,,

## 2022-11-22 PROCEDURE — 63600175 PHARM REV CODE 636 W HCPCS: Performed by: NURSE ANESTHETIST, CERTIFIED REGISTERED

## 2022-11-22 PROCEDURE — 3044F HG A1C LEVEL LT 7.0%: CPT | Mod: CPTII,S$GLB,,

## 2022-11-22 PROCEDURE — 1159F PR MEDICATION LIST DOCUMENTED IN MEDICAL RECORD: ICD-10-PCS | Mod: CPTII,S$GLB,,

## 2022-11-22 RX ORDER — KETOROLAC TROMETHAMINE 30 MG/ML
30 INJECTION, SOLUTION INTRAMUSCULAR; INTRAVENOUS
Status: COMPLETED | OUTPATIENT
Start: 2022-11-22 | End: 2022-11-22

## 2022-11-22 RX ORDER — KETOROLAC TROMETHAMINE 30 MG/ML
30 INJECTION, SOLUTION INTRAMUSCULAR; INTRAVENOUS
Status: DISCONTINUED | OUTPATIENT
Start: 2022-11-22 | End: 2022-11-22

## 2022-11-22 RX ORDER — PROPOFOL 10 MG/ML
VIAL (ML) INTRAVENOUS
Status: DISCONTINUED | OUTPATIENT
Start: 2022-11-22 | End: 2022-11-22

## 2022-11-22 RX ORDER — SODIUM CHLORIDE 9 MG/ML
INJECTION, SOLUTION INTRAVENOUS CONTINUOUS
Status: DISCONTINUED | OUTPATIENT
Start: 2022-11-22 | End: 2022-11-22 | Stop reason: HOSPADM

## 2022-11-22 RX ORDER — KETOROLAC TROMETHAMINE 30 MG/ML
30 INJECTION, SOLUTION INTRAMUSCULAR; INTRAVENOUS ONCE
Status: DISCONTINUED | OUTPATIENT
Start: 2022-11-22 | End: 2022-11-22

## 2022-11-22 RX ORDER — KETOROLAC TROMETHAMINE 30 MG/ML
30 INJECTION, SOLUTION INTRAMUSCULAR; INTRAVENOUS
Status: CANCELLED | OUTPATIENT
Start: 2022-11-22 | End: 2022-11-22

## 2022-11-22 RX ORDER — PROPOFOL 10 MG/ML
VIAL (ML) INTRAVENOUS CONTINUOUS PRN
Status: DISCONTINUED | OUTPATIENT
Start: 2022-11-22 | End: 2022-11-22

## 2022-11-22 RX ORDER — KETOROLAC TROMETHAMINE 30 MG/ML
30 INJECTION, SOLUTION INTRAMUSCULAR; INTRAVENOUS ONCE AS NEEDED
Status: DISCONTINUED | OUTPATIENT
Start: 2022-11-22 | End: 2022-11-22

## 2022-11-22 RX ORDER — LIDOCAINE HYDROCHLORIDE 20 MG/ML
INJECTION INTRAVENOUS
Status: DISCONTINUED | OUTPATIENT
Start: 2022-11-22 | End: 2022-11-22

## 2022-11-22 RX ADMIN — SODIUM CHLORIDE: 0.9 INJECTION, SOLUTION INTRAVENOUS at 09:11

## 2022-11-22 RX ADMIN — PROPOFOL 70 MG: 10 INJECTION, EMULSION INTRAVENOUS at 09:11

## 2022-11-22 RX ADMIN — KETOROLAC TROMETHAMINE 30 MG: 30 INJECTION, SOLUTION INTRAMUSCULAR; INTRAVENOUS at 03:11

## 2022-11-22 RX ADMIN — LIDOCAINE HYDROCHLORIDE 100 MG: 20 INJECTION INTRAVENOUS at 09:11

## 2022-11-22 RX ADMIN — Medication 150 MCG/KG/MIN: at 09:11

## 2022-11-22 NOTE — H&P
Short Stay Endoscopy History and Physical    PCP - Yas Lozoya MD  Referring Physician - PRE-ADMIT, ENDO -[facility]  No address on file    Procedure - Colonoscopy  ASA - per anesthesia  Mallampati - per anesthesia  History of Anesthesia problems - no  Family history Anesthesia problems -  no   Plan of anesthesia - General    HPI  57 y.o. female  Reason for procedure:   Colon cancer screening [Z12.11]        ROS:  Constitutional: No fevers, chills, No weight loss  CV: No chest pain  Pulm: No cough, No shortness of breath  GI: see HPI    Medical History:  has a past medical history of Abnormal Pap smear, Anxiety, Breast cyst, Celiac disease, Dyspepsia, Dysplasia of cervix, Fibroids, GERD (gastroesophageal reflux disease), Hypertension, and Ovarian cyst.    Surgical History:  has a past surgical history that includes Cholecystectomy; Tubal ligation; conization of cervix; and Esophagogastroduodenoscopy (N/A, 8/6/2020).    Family History: family history includes Heart disease in her father and mother; Hypertension in her mother; Stroke in her maternal grandfather..    Social History:  reports that she has never smoked. She has never used smokeless tobacco. She reports current alcohol use. She reports that she does not use drugs.    Review of patient's allergies indicates:   Allergen Reactions    Acetaminophen-codeine Other (See Comments)     Stomach pains    Iodinated contrast media Anaphylaxis    Mobic [meloxicam] Other (See Comments)     STOMACH ULCERS    Iodine Other (See Comments)    Penicillin Hives    Sulfa (sulfonamide antibiotics) Nausea And Vomiting     Other reaction(s): Rash    Unable to assess      MEDICINES CONTAINING ASPIRIN AFFECT HER STOMACH/STOMACH ULCER    Codeine Rash    Penicillins Nausea And Vomiting and Rash       Medications:   Medications Prior to Admission   Medication Sig Dispense Refill Last Dose    acetaminophen (TYLENOL) 500 MG tablet Take 1-2 tablets (500-1,000 mg total)  by mouth 3 (three) times daily as needed for Pain.  0     ALPRAZolam (XANAX) 0.5 MG tablet TAKE 1 TABLET BY MOUTH EVERY DAY AS NEEDED 30 tablet 0     atenoloL (TENORMIN) 100 MG tablet TAKE 1 TABLET BY MOUTH EVERY DAY 90 tablet 3     atorvastatin (LIPITOR) 10 MG tablet TAKE 1 TABLET BY MOUTH EVERY DAY 90 tablet 3     diclofenac sodium (VOLTAREN) 1 % Gel Apply 2 g topically 4 (four) times daily as needed (joint pain). 300 g 2     gabapentin (NEURONTIN) 600 MG tablet TAKE ONE TABLET EVERY MORNING AND AFTER NOON, AND TWO AT BEDTIME 360 tablet 1     multivitamin (THERAGRAN) per tablet Take 1 tablet by mouth once daily.       omeprazole (PRILOSEC) 40 MG capsule TAKE 1 CAPSULE BY MOUTH EVERY DAY 90 capsule 1        Physical Exam:    Vital Signs: There were no vitals filed for this visit.    General Appearance: Well appearing in no acute distress  Abdomen: Soft, non tender, non distended with normal bowel sounds, no masses    Labs:  Lab Results   Component Value Date    WBC 6.69 08/12/2022    HGB 14.1 08/12/2022    HCT 43.7 08/12/2022     08/12/2022    CHOL 190 08/12/2022    TRIG 74 08/12/2022    HDL 76 (H) 08/12/2022    ALT 19 08/12/2022    AST 16 08/12/2022     08/12/2022    K 4.5 08/12/2022     08/12/2022    CREATININE 0.8 08/12/2022    BUN 14 08/12/2022    CO2 29 08/12/2022    TSH 1.128 08/12/2022    HGBA1C 5.4 08/12/2022       I have explained the risks and benefits of this endoscopic procedure to the patient including but not limited to bleeding, inflammation, infection, perforation, and death.    Assessment/Plan:     CRC Screening     - proceed with colonoscopy       Gissell Dominguez MD  Gastroenterology   [facility]

## 2022-11-22 NOTE — PROGRESS NOTES
"Subjective:       Patient ID: Brianna Coughlin is a 57 y.o. female.    Vitals:  height is 5' 4" (1.626 m) and weight is 79.4 kg (175 lb). Her oral temperature is 98.4 °F (36.9 °C). Her blood pressure is 156/92 (abnormal) and her pulse is 60. Her respiration is 14 and oxygen saturation is 98%.     Chief Complaint: right knee pain    Pt is here today due to an injury to the right knee. Pt states she was hit in the knee with a basket   The incident happened X 3 days  Pt is having pain in the knee and unable to bare weight. The pain radiates to the back of leg. Pain in right hip and lower back    Past Medical History:  No date: Abnormal Pap smear  No date: Anxiety  No date: Breast cyst  No date: Celiac disease  No date: Dyspepsia  No date: Dysplasia of cervix  No date: Fibroids  No date: GERD (gastroesophageal reflux disease)  No date: Hypertension  No date: Ovarian cyst     Provider notes begins below:  The patient is a 57-year-old female that presents to the clinic with her  reporting that she was in the grocery store parking lot 3 days ago and a shopping basket collided into her right knee.  She has been sore ever since.  She completed a colonoscopy this morning and when she was getting into the passenger side of her 's truck with her left leg, she pushed off with her right leg and felt a pop in her knee.  She also experienced lower back pain that shoots down her leg similar to pain of sciatica per patient.  She is unable to bear weight are complete ROM.  She has not taken anything prior to this visit for pain.    Knee Pain   The incident occurred 3 to 5 days ago. Incident location: store. The injury mechanism was a direct blow (hit with basket). The pain is present in the right knee. The pain is at a severity of 7/10. The pain is moderate. Associated symptoms include an inability to bear weight. Pertinent negatives include no numbness or tingling. The symptoms are aggravated by weight bearing. She " has tried nothing for the symptoms.     Neurological:  Negative for numbness.         Objective:      Physical Exam   Constitutional: She is oriented to person, place, and time. She appears well-developed. She is cooperative.  Non-toxic appearance. She does not appear ill. No distress.   HENT:   Head: Normocephalic and atraumatic.   Ears:   Right Ear: Hearing normal.   Left Ear: Hearing normal.   Nose: No mucosal edema, rhinorrhea or nasal deformity. No epistaxis. Right sinus exhibits no maxillary sinus tenderness and no frontal sinus tenderness. Left sinus exhibits no maxillary sinus tenderness and no frontal sinus tenderness.   Mouth/Throat: Uvula is midline, oropharynx is clear and moist and mucous membranes are normal. No trismus in the jaw. Normal dentition. No uvula swelling. No posterior oropharyngeal erythema.   Eyes: Lids are normal. Right eye exhibits no discharge. Left eye exhibits no discharge. No scleral icterus.   Neck: Trachea normal and phonation normal. Neck supple.   Cardiovascular: Normal rate, regular rhythm, normal heart sounds and normal pulses.   Pulmonary/Chest: Effort normal and breath sounds normal. No respiratory distress.   Abdominal: Normal appearance and bowel sounds are normal. She exhibits no distension and no mass. Soft. There is no abdominal tenderness.   Musculoskeletal: Normal range of motion.         General: No deformity. Normal range of motion.      Right knee: She exhibits swelling and ecchymosis. Tenderness found.      Left knee: Normal.   Neurological: She is alert and oriented to person, place, and time. She exhibits normal muscle tone. Coordination normal.   Skin: Skin is warm, dry, intact, not diaphoretic and not pale.   Psychiatric: Her speech is normal and behavior is normal. Judgment and thought content normal.   Nursing note and vitals reviewed.        EXAMINATION:  XR KNEE COMP 4 OR MORE VIEWS RIGHT     CLINICAL HISTORY:  Unspecified injury of right lower leg,  initial encounter     COMPARISON:  04/26/2010     FINDINGS:  Three views right knee.     There is mild bilateral medial compartmental narrowing.  No acute displaced fracture or dislocation of the right knee.  No radiopaque foreign body.  No significant edema.     Impression:     1. No acute displaced fracture or dislocation of the right knee.        Electronically signed by: Norman Caballero MD  Date:                                            11/22/2022  Time:                                           14:47    Assessment:       1. Injury of right knee, initial encounter    2. Sprain of right knee, unspecified ligament, initial encounter            Plan:         Injury of right knee, initial encounter  -     X-Ray Knee Complete 4 Or More Views Right; Future; Expected date: 11/22/2022  -     Discontinue: ketorolac injection 30 mg  -     Discontinue: ketorolac injection 30 mg  -     Discontinue: ketorolac injection 30 mg  -     Discontinue: ketorolac injection 30 mg  -     Discontinue: ketorolac injection 30 mg  -     Discontinue: ketorolac injection 30 mg  -     ketorolac injection 30 mg    Sprain of right knee, unspecified ligament, initial encounter  -     CRUTCHES FOR HOME USE  -     Ambulatory referral/consult to Orthopedics  -     Discontinue: ketorolac injection 30 mg  -     KNEE BRACE FOR HOME USE  -     ketorolac injection 30 mg       The x-ray today of her right knee did not show evidence for dislocation or fracture.  She did have tenderness upon examination of her knee with swelling and ecchymosis.  I am sending her home with a knee brace and RI CE instructions.  She will follow-up with ortho for continued management.        30 minutes spent on patient's encounter. This includes face to face time and non-face to face time preparing to see the patient (eg, review of tests), obtaining and/or reviewing separately obtained history, documenting clinical information in the electronic or other health record,  independently interpreting results and communicating results to the patient/family/caregiver, or care coordinator.

## 2022-11-22 NOTE — PATIENT INSTRUCTIONS
Your x-ray today was negative for dislocation or fracture.  Please stay off your leg for the next few days, use crutches, wear your knee brace and follow RICE instructions on discharge paperwork.  You can take Tylenol and ibuprofen for your pain every 4 hours and alternating.  I placed a referral for ortho for continued management.    RICE (Apply a compressive bandage. Rest and elevate the affected painful area.  Apply cold compresses intermittently as needed.  As pain recedes, begin normal activities slowly as tolerated.  Call if symptoms persist.)    General Referral to [facility]  You were referred to [facility] to Establish Care and Management of your condition.  Please call [phone] to schedule your appointment.    Please return here or go to the Emergency Department for any concerns or worsening of condition.  Please follow up with your primary care doctor or specialist in the next 48-72 hrs as needed.

## 2022-11-22 NOTE — TELEPHONE ENCOUNTER
Patient is having a procedure done on the 4th floor today and asking to be seen. Advised there are no openings for today. Please advise.

## 2022-11-22 NOTE — ANESTHESIA POSTPROCEDURE EVALUATION
Anesthesia Post Evaluation    Patient: Brianna Coughlin    Procedure(s) Performed: Procedure(s) (LRB):  COLONOSCOPY (N/A)    Final Anesthesia Type: general      Patient location during evaluation: PACU  Patient participation: Yes- Able to Participate  Level of consciousness: awake and alert and oriented  Post-procedure vital signs: reviewed and stable  Pain management: adequate  Airway patency: patent    PONV status at discharge: No PONV  Anesthetic complications: no      Cardiovascular status: stable  Respiratory status: unassisted, spontaneous ventilation and room air  Hydration status: euvolemic  Follow-up not needed.          Vitals Value Taken Time   /76 11/22/22 1005   Temp 36.5 °C (97.7 °F) 11/22/22 0950   Pulse 67 11/22/22 1005   Resp 17 11/22/22 1005   SpO2 99 % 11/22/22 1005         No case tracking events are documented in the log.      Pain/Gato Score: Gato Score: 9 (11/22/2022  9:50 AM)

## 2022-11-22 NOTE — ANESTHESIA PREPROCEDURE EVALUATION
11/22/2022  Brianna Coughlin is a 57 y.o., female.  Past Medical History:   Diagnosis Date    Abnormal Pap smear     Anxiety     Breast cyst     Celiac disease     Dyspepsia     Dysplasia of cervix     Fibroids     GERD (gastroesophageal reflux disease)     Hypertension     Ovarian cyst      Past Surgical History:   Procedure Laterality Date    CHOLECYSTECTOMY      conization of cervix      ESOPHAGOGASTRODUODENOSCOPY N/A 8/6/2020    Procedure: EGD (ESOPHAGOGASTRODUODENOSCOPY);  Surgeon: Renard Gallegos MD;  Location: [address];  Service: Endoscopy;  Laterality: N/A;    TUBAL LIGATION             Pre-op Assessment    I have reviewed the Patient Summary Reports.       I have reviewed the Medications.     Review of Systems  Cardiovascular:   Hypertension  Hypertension    Hepatic/GI:   GERD    Neurological:   Headaches        Physical Exam  General: Well nourished    Airway:  Mallampati: II / I  Mouth Opening: Normal  TM Distance: Normal  Tongue: Normal  Neck ROM: Normal ROM    Dental:  Intact    Chest/Lungs:  Clear to auscultation, Normal Respiratory Rate        Anesthesia Plan  Type of Anesthesia, risks & benefits discussed:    Anesthesia Type: Gen Natural Airway  Intra-op Monitoring Plan: Standard ASA Monitors  Induction:  IV  Airway Plan: , Post-Induction  Informed Consent: Informed consent signed with the Patient and all parties understand the risks and agree with anesthesia plan.  All questions answered.   ASA Score: 2  Day of Surgery Review of History & Physical: H&P Update referred to the surgeon/provider.I have interviewed and examined the patient. I have reviewed the patient's H&P dated: There are no significant changes.     Ready For Surgery From Anesthesia Perspective.     .

## 2022-11-22 NOTE — TRANSFER OF CARE
"Anesthesia Transfer of Care Note    Patient: Brianna Coughlin    Procedure(s) Performed: Procedure(s) (LRB):  COLONOSCOPY (N/A)    Patient location: GI    Anesthesia Type: general    Transport from OR: Transported from OR on room air with adequate spontaneous ventilation    Post pain: adequate analgesia    Post assessment: no apparent anesthetic complications and tolerated procedure well    Post vital signs: stable    Level of consciousness: awake    Nausea/Vomiting: no nausea/vomiting    Complications: none    Transfer of care protocol was followed      Last vitals:   Visit Vitals  /72 (BP Location: Left arm, Patient Position: Lying)   Pulse (!) 58   Temp 36.7 °C (98.1 °F) (Temporal)   Resp 16   Ht 5' 4" (1.626 m)   Wt 79.4 kg (175 lb)   SpO2 100%   Breastfeeding No   BMI 30.04 kg/m²     "

## 2022-11-22 NOTE — PROVATION PATIENT INSTRUCTIONS
Discharge Summary/Instructions after an Endoscopic Procedure  Patient Name: Brianna Coughlin  Patient MRN: [id number]  Patient YOB: 1965 Tuesday, November 22, 2022  Gissell Dominguez MD  Dear patient,  As a result of recent federal legislation (The Federal Cures Act), you may   receive lab or pathology results from your procedure in your [facility]   account before your physician is able to contact you. Your physician or   their representative will relay the results to you with their   recommendations at their soonest availability.  Thank you,  RESTRICTIONS:  During your procedure today, you received medications for sedation.  These   medications may affect your judgment, balance and coordination.  Therefore,   for 24 hours, you have the following restrictions:   - DO NOT drive a car, operate machinery, make legal/financial decisions,   sign important papers or drink alcohol.    ACTIVITY:  Today: no heavy lifting, straining or running due to procedural   sedation/anesthesia.  The following day: return to full activity including work.  DIET:  Eat and drink normally unless instructed otherwise.     TREATMENT FOR COMMON SIDE EFFECTS:  - Mild abdominal pain, nausea, belching, bloating or excessive gas:  rest,   eat lightly and use a heating pad.  - Sore Throat: treat with throat lozenges and/or gargle with warm salt   water.  - Because air was used during the procedure, expelling large amounts of air   from your rectum or belching is normal.  - If a bowel prep was taken, you may not have a bowel movement for 1-3 days.    This is normal.  SYMPTOMS TO WATCH FOR AND REPORT TO YOUR PHYSICIAN:  1. Abdominal pain or bloating, other than gas cramps.  2. Chest pain.  3. Back pain.  4. Signs of infection such as: chills or fever occurring within 24 hours   after the procedure.  5. Rectal bleeding, which would show as bright red, maroon, or black stools.   (A tablespoon of blood from the rectum is not serious, especially  if   hemorrhoids are present.)  6. Vomiting.  7. Weakness or dizziness.  GO DIRECTLY TO THE NEAREST EMERGENCY ROOM IF YOU HAVE ANY OF THE FOLLOWING:      Difficulty breathing              Chills and/or fever over 101 F   Persistent vomiting and/or vomiting blood   Severe abdominal pain   Severe chest pain   Black, tarry stools   Bleeding- more than one tablespoon   Any other symptom or condition that you feel may need urgent attention  Your doctor recommends these additional instructions:  If any biopsies were taken, your doctors clinic will contact you in 1 to 2   weeks with any results.  - Discharge patient to home (ambulatory).   - Resume regular diet.   - Continue present medications.   - Repeat colonoscopy in 10 years for screening purposes.  For questions, problems or results please call your physician - Gissell Dominguez MD at Work:  [phone].  [facility] [place], EMERGENCY ROOM PHONE NUMBER: [phone]  IF A COMPLICATION OR EMERGENCY SITUATION ARISES AND YOU ARE UNABLE TO REACH   YOUR PHYSICIAN - GO DIRECTLY TO THE EMERGENCY ROOM.  Gissell Dominguez MD  11/22/2022 9:54:53 AM  This report has been verified and signed electronically.  Dear patient,  As a result of recent federal legislation (The Federal Cures Act), you may   receive lab or pathology results from your procedure in your [facility]   account before your physician is able to contact you. Your physician or   their representative will relay the results to you with their   recommendations at their soonest availability.  Thank you,  PROVATION

## 2022-11-25 ENCOUNTER — OFFICE VISIT (OUTPATIENT)
Dept: ORTHOPEDICS | Facility: CLINIC | Age: 57
End: 2022-11-25
Payer: COMMERCIAL

## 2022-11-25 VITALS — WEIGHT: 175.38 LBS | HEIGHT: 64 IN | BODY MASS INDEX: 29.94 KG/M2

## 2022-11-25 DIAGNOSIS — S89.91XD INJURY OF RIGHT KNEE, SUBSEQUENT ENCOUNTER: Primary | ICD-10-CM

## 2022-11-25 PROCEDURE — 20610 DRAIN/INJ JOINT/BURSA W/O US: CPT | Mod: RT,S$GLB,, | Performed by: PHYSICIAN ASSISTANT

## 2022-11-25 PROCEDURE — 1159F PR MEDICATION LIST DOCUMENTED IN MEDICAL RECORD: ICD-10-PCS | Mod: CPTII,S$GLB,, | Performed by: PHYSICIAN ASSISTANT

## 2022-11-25 PROCEDURE — 20610 PR DRAIN/INJECT LARGE JOINT/BURSA: ICD-10-PCS | Mod: RT,S$GLB,, | Performed by: PHYSICIAN ASSISTANT

## 2022-11-25 PROCEDURE — 3008F BODY MASS INDEX DOCD: CPT | Mod: CPTII,S$GLB,, | Performed by: PHYSICIAN ASSISTANT

## 2022-11-25 PROCEDURE — 99499 UNLISTED E&M SERVICE: CPT | Mod: S$GLB,,, | Performed by: PHYSICIAN ASSISTANT

## 2022-11-25 PROCEDURE — 99204 OFFICE O/P NEW MOD 45 MIN: CPT | Mod: 25,S$GLB,, | Performed by: PHYSICIAN ASSISTANT

## 2022-11-25 PROCEDURE — 3044F PR MOST RECENT HEMOGLOBIN A1C LEVEL <7.0%: ICD-10-PCS | Mod: CPTII,S$GLB,, | Performed by: PHYSICIAN ASSISTANT

## 2022-11-25 PROCEDURE — 99204 PR OFFICE/OUTPT VISIT, NEW, LEVL IV, 45-59 MIN: ICD-10-PCS | Mod: 25,S$GLB,, | Performed by: PHYSICIAN ASSISTANT

## 2022-11-25 PROCEDURE — 99499 NO LOS: ICD-10-PCS | Mod: S$GLB,,, | Performed by: PHYSICIAN ASSISTANT

## 2022-11-25 PROCEDURE — 3008F PR BODY MASS INDEX (BMI) DOCUMENTED: ICD-10-PCS | Mod: CPTII,S$GLB,, | Performed by: PHYSICIAN ASSISTANT

## 2022-11-25 PROCEDURE — 99999 PR PBB SHADOW E&M-EST. PATIENT-LVL III: ICD-10-PCS | Mod: PBBFAC,,, | Performed by: PHYSICIAN ASSISTANT

## 2022-11-25 PROCEDURE — 99999 PR PBB SHADOW E&M-EST. PATIENT-LVL III: CPT | Mod: PBBFAC,,, | Performed by: PHYSICIAN ASSISTANT

## 2022-11-25 PROCEDURE — 1159F MED LIST DOCD IN RCRD: CPT | Mod: CPTII,S$GLB,, | Performed by: PHYSICIAN ASSISTANT

## 2022-11-25 PROCEDURE — 3044F HG A1C LEVEL LT 7.0%: CPT | Mod: CPTII,S$GLB,, | Performed by: PHYSICIAN ASSISTANT

## 2022-11-29 RX ORDER — TRIAMCINOLONE ACETONIDE 40 MG/ML
40 INJECTION, SUSPENSION INTRA-ARTICULAR; INTRAMUSCULAR
Status: COMPLETED | OUTPATIENT
Start: 2022-11-29 | End: 2022-11-29

## 2022-11-29 RX ADMIN — TRIAMCINOLONE ACETONIDE 40 MG: 40 INJECTION, SUSPENSION INTRA-ARTICULAR; INTRAMUSCULAR at 12:11
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
## 2022-12-15 ENCOUNTER — HOSPITAL ENCOUNTER (OUTPATIENT)
Dept: RADIOLOGY | Facility: HOSPITAL | Age: 57
Discharge: HOME | End: 2022-12-15
Attending: PHYSICIAN ASSISTANT
Payer: COMMERCIAL

## 2022-12-15 DIAGNOSIS — S89.91XD INJURY OF RIGHT KNEE, SUBSEQUENT ENCOUNTER: ICD-10-CM

## 2022-12-15 PROCEDURE — 73721 MRI JNT OF LWR EXTRE W/O DYE: CPT | Mod: 26,RT,, | Performed by: RADIOLOGY

## 2022-12-15 PROCEDURE — 73721 MRI JNT OF LWR EXTRE W/O DYE: CPT | Mod: TC,RT

## 2022-12-15 PROCEDURE — 73721 MRI KNEE WITHOUT CONTRAST RIGHT: ICD-10-PCS | Mod: 26,RT,, | Performed by: RADIOLOGY

## 2022-12-16 ENCOUNTER — TELEPHONE (OUTPATIENT)
Dept: ORTHOPEDICS | Facility: CLINIC | Age: 57
End: 2022-12-16
Payer: COMMERCIAL

## 2022-12-16 ENCOUNTER — PATIENT MESSAGE (OUTPATIENT)
Dept: PHYSICAL MEDICINE AND REHAB | Facility: CLINIC | Age: 57
End: 2022-12-16
Payer: COMMERCIAL

## 2022-12-16 NOTE — TELEPHONE ENCOUNTER
Spoke to patient. MRI results reviewed. CSI helped some but she continues to have some pain and mechanical symptoms. Will schedule with sports to discuss options including arthroscopy.

## 2022-12-29 ENCOUNTER — OFFICE VISIT (OUTPATIENT)
Dept: PHYSICAL MEDICINE AND REHAB | Facility: CLINIC | Age: 57
End: 2022-12-29
Payer: COMMERCIAL

## 2022-12-29 VITALS
HEIGHT: 64 IN | SYSTOLIC BLOOD PRESSURE: 129 MMHG | BODY MASS INDEX: 30.1 KG/M2 | HEART RATE: 58 BPM | DIASTOLIC BLOOD PRESSURE: 79 MMHG

## 2022-12-29 DIAGNOSIS — M54.41 CHRONIC MIDLINE LOW BACK PAIN WITH RIGHT-SIDED SCIATICA: Primary | ICD-10-CM

## 2022-12-29 DIAGNOSIS — G89.29 CHRONIC MIDLINE LOW BACK PAIN WITH RIGHT-SIDED SCIATICA: Primary | ICD-10-CM

## 2022-12-29 DIAGNOSIS — S83.231S COMPLEX TEAR OF MEDIAL MENISCUS OF RIGHT KNEE AS CURRENT INJURY, SEQUELA: ICD-10-CM

## 2022-12-29 DIAGNOSIS — M48.061 NEURAL FORAMINAL STENOSIS OF LUMBAR SPINE: ICD-10-CM

## 2022-12-29 DIAGNOSIS — M54.16 RIGHT LUMBAR RADICULOPATHY: ICD-10-CM

## 2022-12-29 DIAGNOSIS — M77.11 LATERAL EPICONDYLITIS OF RIGHT ELBOW: ICD-10-CM

## 2022-12-29 DIAGNOSIS — M51.36 DDD (DEGENERATIVE DISC DISEASE), LUMBAR: ICD-10-CM

## 2022-12-29 PROCEDURE — 99999 PR PBB SHADOW E&M-EST. PATIENT-LVL III: ICD-10-PCS | Mod: PBBFAC,,, | Performed by: PHYSICAL MEDICINE & REHABILITATION

## 2022-12-29 PROCEDURE — 3008F PR BODY MASS INDEX (BMI) DOCUMENTED: ICD-10-PCS | Mod: CPTII,S$GLB,, | Performed by: PHYSICAL MEDICINE & REHABILITATION

## 2022-12-29 PROCEDURE — 3044F HG A1C LEVEL LT 7.0%: CPT | Mod: CPTII,S$GLB,, | Performed by: PHYSICAL MEDICINE & REHABILITATION

## 2022-12-29 PROCEDURE — 3078F DIAST BP <80 MM HG: CPT | Mod: CPTII,S$GLB,, | Performed by: PHYSICAL MEDICINE & REHABILITATION

## 2022-12-29 PROCEDURE — 99214 PR OFFICE/OUTPT VISIT, EST, LEVL IV, 30-39 MIN: ICD-10-PCS | Mod: S$GLB,,, | Performed by: PHYSICAL MEDICINE & REHABILITATION

## 2022-12-29 PROCEDURE — 99999 PR PBB SHADOW E&M-EST. PATIENT-LVL III: CPT | Mod: PBBFAC,,, | Performed by: PHYSICAL MEDICINE & REHABILITATION

## 2022-12-29 PROCEDURE — 3074F PR MOST RECENT SYSTOLIC BLOOD PRESSURE < 130 MM HG: ICD-10-PCS | Mod: CPTII,S$GLB,, | Performed by: PHYSICAL MEDICINE & REHABILITATION

## 2022-12-29 PROCEDURE — 3008F BODY MASS INDEX DOCD: CPT | Mod: CPTII,S$GLB,, | Performed by: PHYSICAL MEDICINE & REHABILITATION

## 2022-12-29 PROCEDURE — 1159F PR MEDICATION LIST DOCUMENTED IN MEDICAL RECORD: ICD-10-PCS | Mod: CPTII,S$GLB,, | Performed by: PHYSICAL MEDICINE & REHABILITATION

## 2022-12-29 PROCEDURE — 1159F MED LIST DOCD IN RCRD: CPT | Mod: CPTII,S$GLB,, | Performed by: PHYSICAL MEDICINE & REHABILITATION

## 2022-12-29 PROCEDURE — 99214 OFFICE O/P EST MOD 30 MIN: CPT | Mod: S$GLB,,, | Performed by: PHYSICAL MEDICINE & REHABILITATION

## 2022-12-29 PROCEDURE — 3074F SYST BP LT 130 MM HG: CPT | Mod: CPTII,S$GLB,, | Performed by: PHYSICAL MEDICINE & REHABILITATION

## 2022-12-29 PROCEDURE — 3078F PR MOST RECENT DIASTOLIC BLOOD PRESSURE < 80 MM HG: ICD-10-PCS | Mod: CPTII,S$GLB,, | Performed by: PHYSICAL MEDICINE & REHABILITATION

## 2022-12-29 PROCEDURE — 3044F PR MOST RECENT HEMOGLOBIN A1C LEVEL <7.0%: ICD-10-PCS | Mod: CPTII,S$GLB,, | Performed by: PHYSICAL MEDICINE & REHABILITATION
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
## 2023-02-15 ENCOUNTER — PATIENT MESSAGE (OUTPATIENT)
Dept: FAMILY MEDICINE | Facility: CLINIC | Age: 58
End: 2023-02-15
Payer: COMMERCIAL

## 2023-02-15 DIAGNOSIS — Z78.0 MENOPAUSE: Primary | ICD-10-CM

## 2023-02-15 DIAGNOSIS — Z12.31 SCREENING MAMMOGRAM FOR BREAST CANCER: ICD-10-CM

## 2023-02-15 NOTE — TELEPHONE ENCOUNTER
No new care gaps identified.  [facility] Embedded Care Gaps. Reference number: [id number]. 2/15/2023   2:29:52 PM CST

## 2023-02-16 RX ORDER — ALPRAZOLAM 0.5 MG/1
TABLET ORAL
Qty: 30 TABLET | Refills: 0 | Status: SHIPPED | OUTPATIENT
Start: 2023-02-16 | End: 2023-11-08 | Stop reason: SDUPTHER

## 2023-03-31 ENCOUNTER — HOSPITAL ENCOUNTER (OUTPATIENT)
Dept: RADIOLOGY | Facility: HOSPITAL | Age: 58
Discharge: HOME | End: 2023-03-31
Attending: FAMILY MEDICINE
Payer: COMMERCIAL

## 2023-03-31 DIAGNOSIS — Z12.31 SCREENING MAMMOGRAM FOR BREAST CANCER: ICD-10-CM

## 2023-03-31 PROCEDURE — 77067 MAMMO DIGITAL SCREENING BILAT WITH TOMO: ICD-10-PCS | Mod: 26,,, | Performed by: RADIOLOGY

## 2023-03-31 PROCEDURE — 77063 BREAST TOMOSYNTHESIS BI: CPT | Mod: 26,,, | Performed by: RADIOLOGY

## 2023-03-31 PROCEDURE — 77067 SCR MAMMO BI INCL CAD: CPT | Mod: TC,PO

## 2023-03-31 PROCEDURE — 77063 MAMMO DIGITAL SCREENING BILAT WITH TOMO: ICD-10-PCS | Mod: 26,,, | Performed by: RADIOLOGY

## 2023-03-31 PROCEDURE — 77067 SCR MAMMO BI INCL CAD: CPT | Mod: 26,,, | Performed by: RADIOLOGY

## 2023-04-14 ENCOUNTER — OFFICE VISIT (OUTPATIENT)
Dept: PHYSICAL MEDICINE AND REHAB | Facility: CLINIC | Age: 58
End: 2023-04-14
Payer: COMMERCIAL

## 2023-04-14 ENCOUNTER — PATIENT MESSAGE (OUTPATIENT)
Dept: FAMILY MEDICINE | Facility: CLINIC | Age: 58
End: 2023-04-14
Payer: COMMERCIAL

## 2023-04-14 VITALS
DIASTOLIC BLOOD PRESSURE: 72 MMHG | HEIGHT: 64 IN | BODY MASS INDEX: 30.14 KG/M2 | HEART RATE: 70 BPM | WEIGHT: 176.56 LBS | SYSTOLIC BLOOD PRESSURE: 198 MMHG

## 2023-04-14 DIAGNOSIS — E78.5 HYPERLIPIDEMIA, UNSPECIFIED HYPERLIPIDEMIA TYPE: ICD-10-CM

## 2023-04-14 DIAGNOSIS — M54.41 CHRONIC MIDLINE LOW BACK PAIN WITH RIGHT-SIDED SCIATICA: Primary | ICD-10-CM

## 2023-04-14 DIAGNOSIS — M77.11 LATERAL EPICONDYLITIS OF RIGHT ELBOW: ICD-10-CM

## 2023-04-14 DIAGNOSIS — M51.36 DDD (DEGENERATIVE DISC DISEASE), LUMBAR: ICD-10-CM

## 2023-04-14 DIAGNOSIS — G89.29 CHRONIC MIDLINE LOW BACK PAIN WITH RIGHT-SIDED SCIATICA: Primary | ICD-10-CM

## 2023-04-14 DIAGNOSIS — M48.061 NEURAL FORAMINAL STENOSIS OF LUMBAR SPINE: ICD-10-CM

## 2023-04-14 DIAGNOSIS — S83.231S COMPLEX TEAR OF MEDIAL MENISCUS OF RIGHT KNEE AS CURRENT INJURY, SEQUELA: ICD-10-CM

## 2023-04-14 DIAGNOSIS — M54.16 RIGHT LUMBAR RADICULOPATHY: ICD-10-CM

## 2023-04-14 PROCEDURE — 3077F SYST BP >= 140 MM HG: CPT | Mod: CPTII,S$GLB,, | Performed by: PHYSICAL MEDICINE & REHABILITATION

## 2023-04-14 PROCEDURE — 99213 PR OFFICE/OUTPT VISIT, EST, LEVL III, 20-29 MIN: ICD-10-PCS | Mod: S$GLB,,, | Performed by: PHYSICAL MEDICINE & REHABILITATION

## 2023-04-14 PROCEDURE — 99999 PR PBB SHADOW E&M-EST. PATIENT-LVL III: ICD-10-PCS | Mod: PBBFAC,,, | Performed by: PHYSICAL MEDICINE & REHABILITATION

## 2023-04-14 PROCEDURE — 1159F PR MEDICATION LIST DOCUMENTED IN MEDICAL RECORD: ICD-10-PCS | Mod: CPTII,S$GLB,, | Performed by: PHYSICAL MEDICINE & REHABILITATION

## 2023-04-14 PROCEDURE — 1159F MED LIST DOCD IN RCRD: CPT | Mod: CPTII,S$GLB,, | Performed by: PHYSICAL MEDICINE & REHABILITATION

## 2023-04-14 PROCEDURE — 3078F PR MOST RECENT DIASTOLIC BLOOD PRESSURE < 80 MM HG: ICD-10-PCS | Mod: CPTII,S$GLB,, | Performed by: PHYSICAL MEDICINE & REHABILITATION

## 2023-04-14 PROCEDURE — 99999 PR PBB SHADOW E&M-EST. PATIENT-LVL III: CPT | Mod: PBBFAC,,, | Performed by: PHYSICAL MEDICINE & REHABILITATION

## 2023-04-14 PROCEDURE — 3077F PR MOST RECENT SYSTOLIC BLOOD PRESSURE >= 140 MM HG: ICD-10-PCS | Mod: CPTII,S$GLB,, | Performed by: PHYSICAL MEDICINE & REHABILITATION

## 2023-04-14 PROCEDURE — 3078F DIAST BP <80 MM HG: CPT | Mod: CPTII,S$GLB,, | Performed by: PHYSICAL MEDICINE & REHABILITATION

## 2023-04-14 PROCEDURE — 3008F PR BODY MASS INDEX (BMI) DOCUMENTED: ICD-10-PCS | Mod: CPTII,S$GLB,, | Performed by: PHYSICAL MEDICINE & REHABILITATION

## 2023-04-14 PROCEDURE — 99213 OFFICE O/P EST LOW 20 MIN: CPT | Mod: S$GLB,,, | Performed by: PHYSICAL MEDICINE & REHABILITATION

## 2023-04-14 PROCEDURE — 3008F BODY MASS INDEX DOCD: CPT | Mod: CPTII,S$GLB,, | Performed by: PHYSICAL MEDICINE & REHABILITATION

## 2023-04-14 RX ORDER — ATORVASTATIN CALCIUM 10 MG/1
10 TABLET, FILM COATED ORAL DAILY
Qty: 90 TABLET | Refills: 3 | Status: SHIPPED | OUTPATIENT
Start: 2023-04-14

## 2023-04-14 RX ORDER — GABAPENTIN 600 MG/1
300 TABLET ORAL 3 TIMES DAILY
Start: 2023-04-14 | End: 2023-08-02
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
## 2023-04-14 NOTE — TELEPHONE ENCOUNTER
No new care gaps identified.  [facility] Embedded Care Gaps. Reference number: [id number]. 4/14/2023   2:05:40 PM CDT

## 2023-05-04 ENCOUNTER — PATIENT MESSAGE (OUTPATIENT)
Dept: FAMILY MEDICINE | Facility: CLINIC | Age: 58
End: 2023-05-04
Payer: COMMERCIAL

## 2023-05-08 ENCOUNTER — TELEPHONE (OUTPATIENT)
Dept: FAMILY MEDICINE | Facility: CLINIC | Age: 58
End: 2023-05-08
Payer: COMMERCIAL

## 2023-05-10 ENCOUNTER — PATIENT MESSAGE (OUTPATIENT)
Dept: FAMILY MEDICINE | Facility: CLINIC | Age: 58
End: 2023-05-10
Payer: COMMERCIAL

## 2023-05-10 DIAGNOSIS — Z78.0 MENOPAUSE: Primary | ICD-10-CM

## 2023-05-10 RX ORDER — FLUOXETINE 10 MG/1
10 CAPSULE ORAL DAILY
Qty: 90 CAPSULE | Refills: 3 | Status: SHIPPED | OUTPATIENT
Start: 2023-05-10 | End: 2024-05-09

## 2023-05-22 ENCOUNTER — PATIENT MESSAGE (OUTPATIENT)
Dept: PHYSICAL MEDICINE AND REHAB | Facility: CLINIC | Age: 58
End: 2023-05-22
Payer: COMMERCIAL

## 2023-08-02 ENCOUNTER — OFFICE VISIT (OUTPATIENT)
Dept: FAMILY MEDICINE | Facility: CLINIC | Age: 58
End: 2023-08-02
Payer: COMMERCIAL

## 2023-08-02 ENCOUNTER — LAB VISIT (OUTPATIENT)
Dept: LAB | Facility: HOSPITAL | Age: 58
End: 2023-08-02
Attending: FAMILY MEDICINE
Payer: COMMERCIAL

## 2023-08-02 VITALS
HEART RATE: 60 BPM | WEIGHT: 175.94 LBS | BODY MASS INDEX: 30.04 KG/M2 | OXYGEN SATURATION: 98 % | TEMPERATURE: 98 F | HEIGHT: 64 IN | DIASTOLIC BLOOD PRESSURE: 78 MMHG | SYSTOLIC BLOOD PRESSURE: 118 MMHG

## 2023-08-02 DIAGNOSIS — Z00.00 ANNUAL PHYSICAL EXAM: ICD-10-CM

## 2023-08-02 DIAGNOSIS — E78.01 FAMILIAL HYPERCHOLESTEROLEMIA: ICD-10-CM

## 2023-08-02 DIAGNOSIS — I10 ESSENTIAL HYPERTENSION: ICD-10-CM

## 2023-08-02 DIAGNOSIS — Z00.00 ANNUAL PHYSICAL EXAM: Primary | ICD-10-CM

## 2023-08-02 LAB
ALBUMIN SERPL BCP-MCNC: 3.8 G/DL (ref 3.5–5.2)
ALP SERPL-CCNC: 57 U/L (ref 55–135)
ALT SERPL W/O P-5'-P-CCNC: 18 U/L (ref 10–44)
ANION GAP SERPL CALC-SCNC: 7 MMOL/L (ref 8–16)
AST SERPL-CCNC: 16 U/L (ref 10–40)
BASOPHILS # BLD AUTO: 0.04 K/UL (ref 0–0.2)
BASOPHILS NFR BLD: 0.7 % (ref 0–1.9)
BILIRUB SERPL-MCNC: 0.6 MG/DL (ref 0.1–1)
BUN SERPL-MCNC: 16 MG/DL (ref 6–20)
CALCIUM SERPL-MCNC: 9.7 MG/DL (ref 8.7–10.5)
CHLORIDE SERPL-SCNC: 104 MMOL/L (ref 95–110)
CHOLEST SERPL-MCNC: 204 MG/DL (ref 120–199)
CHOLEST/HDLC SERPL: 3 {RATIO} (ref 2–5)
CO2 SERPL-SCNC: 28 MMOL/L (ref 23–29)
CREAT SERPL-MCNC: 0.9 MG/DL (ref 0.5–1.4)
DIFFERENTIAL METHOD: NORMAL
EOSINOPHIL # BLD AUTO: 0.3 K/UL (ref 0–0.5)
EOSINOPHIL NFR BLD: 4.4 % (ref 0–8)
ERYTHROCYTE [DISTWIDTH] IN BLOOD BY AUTOMATED COUNT: 13.4 % (ref 11.5–14.5)
EST. GFR  (NO RACE VARIABLE): >60 ML/MIN/1.73 M^2
ESTIMATED AVG GLUCOSE: 105 MG/DL (ref 68–131)
GLUCOSE SERPL-MCNC: 102 MG/DL (ref 70–110)
HBA1C MFR BLD: 5.3 % (ref 4–5.6)
HCT VFR BLD AUTO: 41.7 % (ref 37–48.5)
HDLC SERPL-MCNC: 69 MG/DL (ref 40–75)
HDLC SERPL: 33.8 % (ref 20–50)
HGB BLD-MCNC: 13.6 G/DL (ref 12–16)
IMM GRANULOCYTES # BLD AUTO: 0.01 K/UL (ref 0–0.04)
IMM GRANULOCYTES NFR BLD AUTO: 0.2 % (ref 0–0.5)
LDLC SERPL CALC-MCNC: 110 MG/DL (ref 63–159)
LYMPHOCYTES # BLD AUTO: 1.9 K/UL (ref 1–4.8)
LYMPHOCYTES NFR BLD: 32.7 % (ref 18–48)
MCH RBC QN AUTO: 29.3 PG (ref 27–31)
MCHC RBC AUTO-ENTMCNC: 32.6 G/DL (ref 32–36)
MCV RBC AUTO: 90 FL (ref 82–98)
MONOCYTES # BLD AUTO: 0.7 K/UL (ref 0.3–1)
MONOCYTES NFR BLD: 10.9 % (ref 4–15)
NEUTROPHILS # BLD AUTO: 3 K/UL (ref 1.8–7.7)
NEUTROPHILS NFR BLD: 51.1 % (ref 38–73)
NONHDLC SERPL-MCNC: 135 MG/DL
NRBC BLD-RTO: 0 /100 WBC
PLATELET # BLD AUTO: 314 K/UL (ref 150–450)
PMV BLD AUTO: 10.3 FL (ref 9.2–12.9)
POTASSIUM SERPL-SCNC: 4.6 MMOL/L (ref 3.5–5.1)
PROT SERPL-MCNC: 6.9 G/DL (ref 6–8.4)
RBC # BLD AUTO: 4.64 M/UL (ref 4–5.4)
SODIUM SERPL-SCNC: 139 MMOL/L (ref 136–145)
TRIGL SERPL-MCNC: 125 MG/DL (ref 30–150)
TSH SERPL DL<=0.005 MIU/L-ACNC: 1.45 UIU/ML (ref 0.4–4)
WBC # BLD AUTO: 5.94 K/UL (ref 3.9–12.7)

## 2023-08-02 PROCEDURE — 1160F RVW MEDS BY RX/DR IN RCRD: CPT | Mod: CPTII,S$GLB,, | Performed by: FAMILY MEDICINE

## 2023-08-02 PROCEDURE — 3074F PR MOST RECENT SYSTOLIC BLOOD PRESSURE < 130 MM HG: ICD-10-PCS | Mod: CPTII,S$GLB,, | Performed by: FAMILY MEDICINE

## 2023-08-02 PROCEDURE — 3078F DIAST BP <80 MM HG: CPT | Mod: CPTII,S$GLB,, | Performed by: FAMILY MEDICINE

## 2023-08-02 PROCEDURE — 84443 ASSAY THYROID STIM HORMONE: CPT | Performed by: FAMILY MEDICINE

## 2023-08-02 PROCEDURE — 80053 COMPREHEN METABOLIC PANEL: CPT | Performed by: FAMILY MEDICINE

## 2023-08-02 PROCEDURE — 80061 LIPID PANEL: CPT | Performed by: FAMILY MEDICINE

## 2023-08-02 PROCEDURE — 1160F PR REVIEW ALL MEDS BY PRESCRIBER/CLIN PHARMACIST DOCUMENTED: ICD-10-PCS | Mod: CPTII,S$GLB,, | Performed by: FAMILY MEDICINE

## 2023-08-02 PROCEDURE — 85025 COMPLETE CBC W/AUTO DIFF WBC: CPT | Performed by: FAMILY MEDICINE

## 2023-08-02 PROCEDURE — 3078F PR MOST RECENT DIASTOLIC BLOOD PRESSURE < 80 MM HG: ICD-10-PCS | Mod: CPTII,S$GLB,, | Performed by: FAMILY MEDICINE

## 2023-08-02 PROCEDURE — 99999 PR PBB SHADOW E&M-EST. PATIENT-LVL IV: CPT | Mod: PBBFAC,,, | Performed by: FAMILY MEDICINE

## 2023-08-02 PROCEDURE — 83036 HEMOGLOBIN GLYCOSYLATED A1C: CPT | Performed by: FAMILY MEDICINE

## 2023-08-02 PROCEDURE — 99396 PREV VISIT EST AGE 40-64: CPT | Mod: S$GLB,,, | Performed by: FAMILY MEDICINE

## 2023-08-02 PROCEDURE — 3074F SYST BP LT 130 MM HG: CPT | Mod: CPTII,S$GLB,, | Performed by: FAMILY MEDICINE

## 2023-08-02 PROCEDURE — 99396 PR PREVENTIVE VISIT,EST,40-64: ICD-10-PCS | Mod: S$GLB,,, | Performed by: FAMILY MEDICINE

## 2023-08-02 PROCEDURE — 99999 PR PBB SHADOW E&M-EST. PATIENT-LVL IV: ICD-10-PCS | Mod: PBBFAC,,, | Performed by: FAMILY MEDICINE

## 2023-08-02 PROCEDURE — 1159F PR MEDICATION LIST DOCUMENTED IN MEDICAL RECORD: ICD-10-PCS | Mod: CPTII,S$GLB,, | Performed by: FAMILY MEDICINE

## 2023-08-02 PROCEDURE — 3008F BODY MASS INDEX DOCD: CPT | Mod: CPTII,S$GLB,, | Performed by: FAMILY MEDICINE

## 2023-08-02 PROCEDURE — 3008F PR BODY MASS INDEX (BMI) DOCUMENTED: ICD-10-PCS | Mod: CPTII,S$GLB,, | Performed by: FAMILY MEDICINE

## 2023-08-02 PROCEDURE — 1159F MED LIST DOCD IN RCRD: CPT | Mod: CPTII,S$GLB,, | Performed by: FAMILY MEDICINE

## 2023-08-02 PROCEDURE — 36415 COLL VENOUS BLD VENIPUNCTURE: CPT | Mod: PO | Performed by: FAMILY MEDICINE

## 2023-08-02 NOTE — PROGRESS NOTES
Assessment & Plan  Problem List Items Addressed This Visit          Cardiac/Vascular    Familial hypercholesterolemia    Relevant Orders    CBC Auto Differential    Comprehensive Metabolic Panel    Hemoglobin A1C    Lipid Panel    TSH    Essential hypertension    Relevant Orders    CBC Auto Differential    Comprehensive Metabolic Panel    Hemoglobin A1C    Lipid Panel    TSH     Other Visit Diagnoses       Annual physical exam    -  Primary    Relevant Orders    CBC Auto Differential    Comprehensive Metabolic Panel    Hemoglobin A1C    Lipid Panel    TSH          I addressed all major concerns as it related to health maintenance.  All were ordered and scheduled based on the patients wishes.  Any additional health maintenance will be readdressed at the next physical if declined or deferred by the patient.      Health Maintenance reviewed,.    Follow-up: No follow-ups on file.    ______________________________________________________________________    Chief Complaint  Chief Complaint   Patient presents with    Annual Exam       HPI  Brianna Coughlin is a 58 y.o. female with multiple medical diagnoses as listed in the medical history and problem list that presents for annual exam.  Pt is known to me with last appointment 8/12/2022.    Patient denies any new symptoms including chest pain, SOB, blurry vision, N/V, diarrhea.        PAST MEDICAL HISTORY:  Past Medical History:   Diagnosis Date    Abnormal Pap smear     Anxiety     Breast cyst     Celiac disease     Dyspepsia     Dysplasia of cervix     Fibroids     GERD (gastroesophageal reflux disease)     Hypertension     Ovarian cyst        PAST SURGICAL HISTORY:  Past Surgical History:   Procedure Laterality Date    CHOLECYSTECTOMY      COLONOSCOPY      COLONOSCOPY N/A 11/22/2022    Procedure: COLONOSCOPY;  Surgeon: Gissell Dominguez MD;  Location: [address];  Service: Gastroenterology;  Laterality: N/A;  Fully vaccinated.EC    conization of cervix       ESOPHAGOGASTRODUODENOSCOPY N/A 08/06/2020    Procedure: EGD (ESOPHAGOGASTRODUODENOSCOPY);  Surgeon: Renard Gallegos MD;  Location: [address]);  Service: Endoscopy;  Laterality: N/A;    TUBAL LIGATION         SOCIAL HISTORY:  Social History     Socioeconomic History    Marital status: Single   Tobacco Use    Smoking status: Never    Smokeless tobacco: Never   Substance and Sexual Activity    Alcohol use: Yes     Alcohol/week: 0.0 standard drinks of alcohol     Comment: socially    Drug use: No    Sexual activity: Not Currently     Partners: Male       FAMILY HISTORY:  Family History   Problem Relation Age of Onset    Hypertension Mother     Heart disease Mother     Heart disease Father     Stroke Maternal Grandfather        ALLERGIES AND MEDICATIONS: updated and reviewed.  Review of patient's allergies indicates:   Allergen Reactions    Acetaminophen-codeine Other (See Comments)     Stomach pains    Iodinated contrast media Anaphylaxis    Mobic [meloxicam] Other (See Comments)     STOMACH ULCERS    Iodine Other (See Comments)    Penicillin Hives    Sulfa (sulfonamide antibiotics) Nausea And Vomiting     Other reaction(s): Rash    Unable to assess      MEDICINES CONTAINING ASPIRIN AFFECT HER STOMACH/STOMACH ULCER    Codeine Rash    Penicillins Nausea And Vomiting and Rash     Current Outpatient Medications   Medication Sig Dispense Refill    ALPRAZolam (XANAX) 0.5 MG tablet TAKE 1 TABLET BY MOUTH EVERY DAY AS NEEDED 30 tablet 0    atenoloL (TENORMIN) 100 MG tablet TAKE 1 TABLET BY MOUTH EVERY DAY 90 tablet 3    atorvastatin (LIPITOR) 10 MG tablet Take 1 tablet (10 mg total) by mouth once daily. 90 tablet 3    diclofenac sodium (VOLTAREN) 1 % Gel Apply 2 g topically 4 (four) times daily as needed (joint pain). 300 g 2    FLUoxetine 10 MG capsule Take 1 capsule (10 mg total) by mouth once daily. 90 capsule 3    multivitamin (THERAGRAN) per tablet Take 1 tablet by mouth once daily.      omeprazole (PRILOSEC) 40 MG  "capsule Take 1 capsule (40 mg total) by mouth once daily. 90 capsule 2    acetaminophen (TYLENOL) 500 MG tablet Take 1-2 tablets (500-1,000 mg total) by mouth 3 (three) times daily as needed for Pain.  0     No current facility-administered medications for this visit.         ROS  Review of Systems   Constitutional:  Negative for activity change, appetite change, fatigue, fever and unexpected weight change.   HENT: Negative.  Negative for ear discharge, ear pain, rhinorrhea and sore throat.    Eyes: Negative.    Respiratory:  Negative for apnea, cough, chest tightness, shortness of breath and wheezing.    Cardiovascular:  Negative for chest pain, palpitations and leg swelling.   Gastrointestinal:  Negative for abdominal distention, abdominal pain, constipation, diarrhea and vomiting.   Endocrine: Negative for cold intolerance, heat intolerance, polydipsia and polyuria.   Genitourinary:  Negative for decreased urine volume and urgency.   Musculoskeletal: Negative.    Skin:  Negative for rash.   Neurological:  Negative for dizziness and headaches.   Hematological:  Does not bruise/bleed easily.   Psychiatric/Behavioral:  Negative for agitation, sleep disturbance and suicidal ideas.            Physical Exam  Vitals:    08/02/23 0732   BP: (!) 142/86   Pulse: 60   Temp: 98 °F (36.7 °C)   TempSrc: Oral   SpO2: 98%   Weight: 79.8 kg (175 lb 14.8 oz)   Height: 5' 4" (1.626 m)    Body mass index is 30.2 kg/m².  Weight: 79.8 kg (175 lb 14.8 oz)   Height: 5' 4" (162.6 cm)   Physical Exam  Vitals reviewed.   Constitutional:       Appearance: Normal appearance. She is well-developed.   HENT:      Head: Normocephalic and atraumatic.      Right Ear: External ear normal.      Left Ear: External ear normal.      Nose: Nose normal.      Mouth/Throat:      Mouth: Mucous membranes are moist.      Pharynx: Oropharynx is clear.   Eyes:      Extraocular Movements: Extraocular movements intact.      Conjunctiva/sclera: Conjunctivae " normal.      Pupils: Pupils are equal, round, and reactive to light.   Cardiovascular:      Rate and Rhythm: Normal rate and regular rhythm.      Heart sounds: Normal heart sounds.   Pulmonary:      Effort: Pulmonary effort is normal.      Breath sounds: Normal breath sounds.   Skin:     General: Skin is warm and dry.   Neurological:      Mental Status: She is alert and oriented to person, place, and time.           Health Maintenance         Date Due Completion Date    COVID-19 Vaccine (4 - Pfizer series) 04/11/2022 2/14/2022    Influenza Vaccine (1) 09/01/2023 9/9/2022    Override on 10/8/2019: Done (at her job)    Mammogram 03/31/2024 3/31/2023    Cervical Cancer Screening 06/17/2025 6/17/2020    Hemoglobin A1c (Diabetic Prevention Screening) 08/12/2025 8/12/2022    Lipid Panel 08/12/2027 8/12/2022    TETANUS VACCINE 05/28/2030 5/28/2020    Colorectal Cancer Screening 11/22/2032 11/22/2022    Override on 7/12/2013: Done                Patient note was created using [employer].  Any errors in syntax or even information may not have been identified and edited on initial review prior to signing this note.

## 2023-09-15 ENCOUNTER — PATIENT MESSAGE (OUTPATIENT)
Dept: FAMILY MEDICINE | Facility: CLINIC | Age: 58
End: 2023-09-15
Payer: COMMERCIAL

## 2023-09-27 ENCOUNTER — PATIENT MESSAGE (OUTPATIENT)
Dept: FAMILY MEDICINE | Facility: CLINIC | Age: 58
End: 2023-09-27
Payer: COMMERCIAL

## 2023-10-24 ENCOUNTER — PATIENT MESSAGE (OUTPATIENT)
Dept: FAMILY MEDICINE | Facility: CLINIC | Age: 58
End: 2023-10-24
Payer: COMMERCIAL

## 2023-11-08 DIAGNOSIS — I10 ESSENTIAL HYPERTENSION: ICD-10-CM

## 2023-11-08 DIAGNOSIS — K21.9 GASTROESOPHAGEAL REFLUX DISEASE: ICD-10-CM

## 2023-11-08 NOTE — TELEPHONE ENCOUNTER
No care due was identified.  [facility] Embedded Care Due Messages. Reference number: [id number].   11/08/2023 12:39:51 PM CST

## 2023-11-09 RX ORDER — ALPRAZOLAM 0.5 MG/1
TABLET ORAL
Qty: 30 TABLET | Refills: 0 | Status: SHIPPED | OUTPATIENT
Start: 2023-11-09

## 2023-11-09 RX ORDER — ATENOLOL 100 MG/1
100 TABLET ORAL DAILY
Qty: 90 TABLET | Refills: 3 | Status: SHIPPED | OUTPATIENT
Start: 2023-11-09

## 2023-11-09 RX ORDER — OMEPRAZOLE 40 MG/1
40 CAPSULE, DELAYED RELEASE ORAL DAILY
Qty: 90 CAPSULE | Refills: 2 | Status: SHIPPED | OUTPATIENT
Start: 2023-11-09

## 2023-12-31 ENCOUNTER — OFFICE VISIT (OUTPATIENT)
Dept: URGENT CARE | Facility: CLINIC | Age: 58
End: 2023-12-31
Payer: COMMERCIAL

## 2023-12-31 VITALS
HEIGHT: 64 IN | TEMPERATURE: 99 F | SYSTOLIC BLOOD PRESSURE: 143 MMHG | WEIGHT: 175 LBS | RESPIRATION RATE: 20 BRPM | DIASTOLIC BLOOD PRESSURE: 87 MMHG | BODY MASS INDEX: 29.88 KG/M2 | HEART RATE: 61 BPM | OXYGEN SATURATION: 98 %

## 2023-12-31 DIAGNOSIS — J06.9 VIRAL URI: Primary | ICD-10-CM

## 2023-12-31 LAB
CTP QC/QA: YES
CTP QC/QA: YES
POC MOLECULAR INFLUENZA A AGN: NEGATIVE
POC MOLECULAR INFLUENZA B AGN: NEGATIVE
SARS-COV-2 AG RESP QL IA.RAPID: NEGATIVE

## 2023-12-31 PROCEDURE — 87502 POCT INFLUENZA A/B MOLECULAR: ICD-10-PCS | Mod: QW,S$GLB,,

## 2023-12-31 PROCEDURE — 87811 SARS-COV-2 COVID19 W/OPTIC: CPT | Mod: QW,S$GLB,,

## 2023-12-31 PROCEDURE — 99213 PR OFFICE/OUTPT VISIT, EST, LEVL III, 20-29 MIN: ICD-10-PCS | Mod: S$GLB,,,

## 2023-12-31 PROCEDURE — 87502 INFLUENZA DNA AMP PROBE: CPT | Mod: QW,S$GLB,,

## 2023-12-31 PROCEDURE — 99213 OFFICE O/P EST LOW 20 MIN: CPT | Mod: S$GLB,,,

## 2023-12-31 PROCEDURE — 87811 SARS CORONAVIRUS 2 ANTIGEN POCT, MANUAL READ: ICD-10-PCS | Mod: QW,S$GLB,,

## 2023-12-31 RX ORDER — PROMETHAZINE HYDROCHLORIDE AND DEXTROMETHORPHAN HYDROBROMIDE 6.25; 15 MG/5ML; MG/5ML
5 SYRUP ORAL EVERY 6 HOURS PRN
Qty: 120 ML | Refills: 0 | Status: SHIPPED | OUTPATIENT
Start: 2023-12-31 | End: 2024-01-10

## 2023-12-31 NOTE — PROGRESS NOTES
"Subjective:      Patient ID: Brianna Coughlin is a 58 y.o. female.    Vitals:  height is 5' 4" (1.626 m) and weight is 79.4 kg (175 lb). Her oral temperature is 98.7 °F (37.1 °C). Her blood pressure is 143/87 (abnormal) and her pulse is 61. Her respiration is 20 and oxygen saturation is 98%.     Chief Complaint: Sore Throat    Patient is a 58-year-old female presenting with sore throat, coughing, headaches, body aches, fever for the past 2 days.  For the past week, she does state that she has been dealing with allergy symptoms including runny nose, sneezing, postnasal drip.  Has been taking Tylenol and over-the-counter cold and flu medications.  Takes Flonase and Allegra for allergies.  Denies CP, SOB, nausea, vomiting, abdominal pain, dizziness.    Sore Throat   This is a new problem. The current episode started in the past 7 days. The problem has been rapidly improving. The maximum temperature recorded prior to her arrival was 100.4 - 100.9 F (measure at home). The pain is at a severity of 4/10. The pain is moderate. Associated symptoms include congestion, coughing and headaches. Pertinent negatives include no abdominal pain, diarrhea, ear pain, neck pain or vomiting. Associated symptoms comments: Sinus pressure  . She has tried nothing for the symptoms. The treatment provided no relief.       Constitution: Negative for chills and fever.   HENT:  Positive for congestion, postnasal drip and sore throat. Negative for ear pain.    Neck: Negative for neck pain.   Cardiovascular:  Negative for chest pain.   Eyes:  Negative for eye discharge and eye redness.   Respiratory:  Positive for cough.    Gastrointestinal:  Negative for abdominal pain, nausea, vomiting and diarrhea.   Genitourinary:  Negative for dysuria.   Musculoskeletal:  Positive for muscle ache.   Skin:  Negative for rash.   Allergic/Immunologic: Positive for sneezing.   Neurological:  Positive for headaches. Negative for dizziness.      Objective: "     Physical Exam   Constitutional: She is oriented to person, place, and time. She appears well-developed.   HENT:   Head: Normocephalic and atraumatic.   Ears:   Right Ear: External ear normal.   Left Ear: External ear normal.   Nose: Nose normal.   Mouth/Throat: Mucous membranes are moist. Posterior oropharyngeal erythema present. Oropharynx is clear.   Eyes: Conjunctivae, EOM and lids are normal. Pupils are equal, round, and reactive to light.   Neck: Trachea normal and phonation normal. Neck supple.   Cardiovascular: Normal rate, regular rhythm, normal heart sounds and normal pulses.   Pulmonary/Chest: Effort normal and breath sounds normal. No respiratory distress.   Musculoskeletal: Normal range of motion.         General: Normal range of motion.   Neurological: no focal deficit. She is alert and oriented to person, place, and time.   Skin: Skin is warm, dry and intact. Capillary refill takes less than 2 seconds.   Psychiatric: Her speech is normal and behavior is normal. Judgment and thought content normal.   Nursing note and vitals reviewed.    Results for orders placed or performed in visit on 12/31/23   POCT Influenza A/B MOLECULAR   Result Value Ref Range    POC Molecular Influenza A Ag Negative Negative, Not Reported    POC Molecular Influenza B Ag Negative Negative, Not Reported     Acceptable Yes    SARS Coronavirus 2 Antigen, POCT Manual Read   Result Value Ref Range    SARS Coronavirus 2 Antigen Negative Negative     Acceptable Yes        Assessment:     1. Viral URI        Plan:     Viral URI  -     POCT Influenza A/B MOLECULAR  -     SARS Coronavirus 2 Antigen, POCT Manual Read  -     promethazine-dextromethorphan (PROMETHAZINE-DM) 6.25-15 mg/5 mL Syrp; Take 5 mLs by mouth every 6 (six) hours as needed (cough).  Dispense: 120 mL; Refill: 0  -     (Magic mouthwash) 1:1:1 diphenhydrAMINE(Benadryl) 12.5mg/5ml liq, aluminum & magnesium hydroxide-simethicone (Maalox),  LIDOcaine viscous 2%; Swish and spit 10 mLs every 4 (four) hours as needed (sore throat).  Dispense: 180 mL; Refill: 0            Patient Instructions   - Rest.    - Drink plenty of fluids.  - Viral upper respiratory infections typically run their course in 10-14 days.      - You can take over-the-counter claritin, zyrtec, allegra, or xyzal as directed. These are antihistamines that can help with runny nose, nasal congestion, sneezing, and helps to dry up post-nasal drip, which usually causes sore throat and cough.              - If you do NOT have high blood pressure, you may use a decongestant form (D)  of this medication (ie. Claritin- D, zyrtec-D, allegra-D) or if you do not take the D form, you can take sudafed (pseudoephedrine) over the counter, which is a decongestant. Do NOT take two decongestant (D) medications at the same time (such as mucinex-D and claritin-D or plain sudafed and claritin D)    - If you DO have Hypertension, anxiety, or palpitations, it is safe to take Coricidin HBP for relief of sinus symptoms.     - You can use Flonase (fluticasone) nasal spray as directed for sinus congestion and postnasal drip. This is a steroid nasal spray that works locally over time to decrease the inflammation in your nose/sinuses and help with allergic symptoms. This is not an quick- relief spray like afrin, but it works well if used daily.  Discontinue if you develop nose bleed  - use nasal saline prior to Flonase.  - Use Ocean Spray Nasal Saline 1-3 puffs each nostril every 2-3 hours then blow out onto tissue. This is to irrigate the nasal passage way to clear the sinus openings. Use until sinus problem resolved.     - you can take plain Mucinex (guaifenesin) 1200 mg twice a day to help loosen mucous.      -warm salt water gargles can help with sore throat     - warm tea with honey can help with cough. Honey is a natural cough suppressant.     - Dextromethorphan (DM) is a cough suppressant over the counter (ie.  mucinex DM, robitussin, delsym; dayquil/nyquil has DM as well.)        - Follow up with your PCP or specialty clinic as directed in the next 1-2 weeks if not improved or as needed.  You can call [phone] to schedule an appointment with the appropriate provider.       - Go to the ER if you develop new or worsening symptoms.      - You must understand that you have received an Urgent Care treatment only and that you may be released before all of your medical problems are known or treated.   - You, the patient, will arrange for follow up care as instructed.   - If your condition worsens or fails to improve we recommend that you receive another evaluation at the ER immediately or contact your PCP to discuss your concerns or return here.

## 2023-12-31 NOTE — PATIENT INSTRUCTIONS

## 2024-01-26 ENCOUNTER — TELEPHONE (OUTPATIENT)
Dept: OBSTETRICS AND GYNECOLOGY | Facility: CLINIC | Age: 59
End: 2024-01-26
Payer: COMMERCIAL

## 2024-01-26 NOTE — TELEPHONE ENCOUNTER
Called patient and scheduled hormone consult on 04/03/24 at 9:45 am. Also added patient to the wait list. Patient said thanks.

## 2024-04-01 ENCOUNTER — HOSPITAL ENCOUNTER (OUTPATIENT)
Dept: RADIOLOGY | Facility: HOSPITAL | Age: 59
Discharge: HOME | End: 2024-04-01
Attending: FAMILY MEDICINE
Payer: COMMERCIAL

## 2024-04-01 DIAGNOSIS — Z12.31 ENCOUNTER FOR SCREENING MAMMOGRAM FOR BREAST CANCER: ICD-10-CM

## 2024-04-01 PROCEDURE — 77067 SCR MAMMO BI INCL CAD: CPT | Mod: 26,,, | Performed by: RADIOLOGY

## 2024-04-01 PROCEDURE — 77067 SCR MAMMO BI INCL CAD: CPT | Mod: TC,PO

## 2024-04-01 PROCEDURE — 77063 BREAST TOMOSYNTHESIS BI: CPT | Mod: 26,,, | Performed by: RADIOLOGY

## 2024-04-03 ENCOUNTER — OFFICE VISIT (OUTPATIENT)
Dept: OBSTETRICS AND GYNECOLOGY | Facility: CLINIC | Age: 59
End: 2024-04-03
Attending: OBSTETRICS & GYNECOLOGY
Payer: COMMERCIAL

## 2024-04-03 ENCOUNTER — LAB VISIT (OUTPATIENT)
Dept: LAB | Facility: OTHER | Age: 59
End: 2024-04-03
Attending: OBSTETRICS & GYNECOLOGY
Payer: COMMERCIAL

## 2024-04-03 VITALS
BODY MASS INDEX: 29.98 KG/M2 | WEIGHT: 175.63 LBS | HEIGHT: 64 IN | SYSTOLIC BLOOD PRESSURE: 160 MMHG | DIASTOLIC BLOOD PRESSURE: 88 MMHG

## 2024-04-03 DIAGNOSIS — N95.1 MENOPAUSAL SYNDROME: Primary | ICD-10-CM

## 2024-04-03 DIAGNOSIS — N95.1 MENOPAUSAL SYNDROME: ICD-10-CM

## 2024-04-03 LAB
ESTRADIOL SERPL-MCNC: <10 PG/ML
FSH SERPL-ACNC: 71.75 MIU/ML
TESTOST SERPL-MCNC: 34 NG/DL (ref 5–73)

## 2024-04-03 PROCEDURE — 83001 ASSAY OF GONADOTROPIN (FSH): CPT | Performed by: OBSTETRICS & GYNECOLOGY

## 2024-04-03 PROCEDURE — 3077F SYST BP >= 140 MM HG: CPT | Mod: CPTII,S$GLB,, | Performed by: OBSTETRICS & GYNECOLOGY

## 2024-04-03 PROCEDURE — 99999 PR PBB SHADOW E&M-EST. PATIENT-LVL III: CPT | Mod: PBBFAC,,, | Performed by: OBSTETRICS & GYNECOLOGY

## 2024-04-03 PROCEDURE — 1159F MED LIST DOCD IN RCRD: CPT | Mod: CPTII,S$GLB,, | Performed by: OBSTETRICS & GYNECOLOGY

## 2024-04-03 PROCEDURE — 84402 ASSAY OF FREE TESTOSTERONE: CPT | Performed by: OBSTETRICS & GYNECOLOGY

## 2024-04-03 PROCEDURE — 99214 OFFICE O/P EST MOD 30 MIN: CPT | Mod: S$GLB,,, | Performed by: OBSTETRICS & GYNECOLOGY

## 2024-04-03 PROCEDURE — 36415 COLL VENOUS BLD VENIPUNCTURE: CPT | Performed by: OBSTETRICS & GYNECOLOGY

## 2024-04-03 PROCEDURE — 84403 ASSAY OF TOTAL TESTOSTERONE: CPT | Performed by: OBSTETRICS & GYNECOLOGY

## 2024-04-03 PROCEDURE — 82670 ASSAY OF TOTAL ESTRADIOL: CPT | Performed by: OBSTETRICS & GYNECOLOGY

## 2024-04-03 PROCEDURE — 3008F BODY MASS INDEX DOCD: CPT | Mod: CPTII,S$GLB,, | Performed by: OBSTETRICS & GYNECOLOGY

## 2024-04-03 PROCEDURE — 3079F DIAST BP 80-89 MM HG: CPT | Mod: CPTII,S$GLB,, | Performed by: OBSTETRICS & GYNECOLOGY

## 2024-04-03 RX ORDER — ESTRADIOL 0.05 MG/D
1 FILM, EXTENDED RELEASE TRANSDERMAL
Qty: 8 PATCH | Refills: 6 | Status: SHIPPED | OUTPATIENT
Start: 2024-04-04 | End: 2024-04-23

## 2024-04-03 RX ORDER — PROGESTERONE 100 MG/1
100 CAPSULE ORAL NIGHTLY
Qty: 30 CAPSULE | Refills: 6 | Status: SHIPPED | OUTPATIENT
Start: 2024-04-03 | End: 2025-04-03

## 2024-04-03 NOTE — PROGRESS NOTES
Brianna Coughlin is a 58 y.o. female who presents to discuss hormone replacement therapy.  Menarche occurred at age 11 and the patient went into menopause at 54 years of age, which was 4 years ago. Patient is requesting hormone replacement therapy due to hot flashes, insomnia, moodiness, and vaginal dryness, as well as low libido. The patient is not taking hormone replacement therapy. Patient denies post-menopausal vaginal bleeding. The patient is sexually active.  She denies the following contraindications to HRT:  Vaginal bleeding, history of VTE/PE, thrombophilia,  breast cancer, or active liver disease.       PCP: Dr. Yas Lozoya       Routine labs: 8-2023  Pap smear: 6/17/2020 July 26,2023: normal  Mammogram: 4-1-2024  DEXA: NA  Colonoscopy: November 2022;Normal    No visits with results within 3 Month(s) from this visit.   Latest known visit with results is:   Office Visit on 12/31/2023   Component Date Value Ref Range Status    POC Molecular Influenza A Ag 12/31/2023 Negative  Negative, Not Reported Final    POC Molecular Influenza B Ag 12/31/2023 Negative  Negative, Not Reported Final     Acceptable 12/31/2023 Yes   Final    SARS Coronavirus 2 Antigen 12/31/2023 Negative  Negative Final     Acceptable 12/31/2023 Yes   Final       Past Medical History:   Diagnosis Date    Abnormal Pap smear     Anxiety     Breast cyst     Celiac disease     Dyspepsia     Dysplasia of cervix     Fibroids     GERD (gastroesophageal reflux disease)     Hypertension     Ovarian cyst      Past Surgical History:   Procedure Laterality Date    CHOLECYSTECTOMY      COLONOSCOPY      COLONOSCOPY N/A 11/22/2022    Procedure: COLONOSCOPY;  Surgeon: Gissell Dominguez MD;  Location: [address];  Service: Gastroenterology;  Laterality: N/A;  Fully vaccinated.EC    conization of cervix      ESOPHAGOGASTRODUODENOSCOPY N/A 08/06/2020    Procedure: EGD (ESOPHAGOGASTRODUODENOSCOPY);  Surgeon: Renard  MD El;  Location: [facility] (4TH FLR);  Service: Endoscopy;  Laterality: N/A;    TUBAL LIGATION       Social History     Tobacco Use    Smoking status: Never    Smokeless tobacco: Never   Substance Use Topics    Alcohol use: Yes     Alcohol/week: 6.0 standard drinks of alcohol     Types: 6 Cans of beer per week     Comment: socially    Drug use: No     Family History   Problem Relation Age of Onset    Stroke Maternal Grandmother     Heart disease Maternal Grandfather         had pacemaker    Heart disease Father 55    Diabetes Mother     Hypertension Mother     Heart disease Mother 55    Heart disease Brother 35    Diabetes Brother     Colon cancer Cousin 60    Heart attacks under age 50 Maternal Uncle 45    Heart attacks under age 50 Maternal Uncle         had heart transplant    Breast cancer Neg Hx      OB History    Para Term  AB Living   2 2 2     2   SAB IAB Ectopic Multiple Live Births           2      # Outcome Date GA Lbr Binu/2nd Weight Sex Delivery Anes PTL Lv   2 Term      Vag-Spont   CAROLA   1 Term      Vag-Spont   CAROLA       Current Outpatient Medications:     acetaminophen (TYLENOL) 500 MG tablet, Take 1-2 tablets (500-1,000 mg total) by mouth 3 (three) times daily as needed for Pain., Disp: , Rfl: 0    ALPRAZolam (XANAX) 0.5 MG tablet, TAKE 1 TABLET BY MOUTH EVERY DAY AS NEEDED, Disp: 30 tablet, Rfl: 0    atenoloL (TENORMIN) 100 MG tablet, Take 1 tablet (100 mg total) by mouth once daily., Disp: 90 tablet, Rfl: 3    atorvastatin (LIPITOR) 10 MG tablet, Take 1 tablet (10 mg total) by mouth once daily., Disp: 90 tablet, Rfl: 3    diclofenac sodium (VOLTAREN) 1 % Gel, Apply 2 g topically 4 (four) times daily as needed (joint pain)., Disp: 300 g, Rfl: 2    FLUoxetine 10 MG capsule, Take 1 capsule (10 mg total) by mouth once daily., Disp: 90 capsule, Rfl: 3    multivitamin (THERAGRAN) per tablet, Take 1 tablet by mouth once daily., Disp: , Rfl:     omeprazole (PRILOSEC) 40 MG capsule, Take 1  "capsule (40 mg total) by mouth once daily., Disp: 90 capsule, Rfl: 2    (Magic mouthwash) 1:1:1 diphenhydrAMINE(Benadryl) 12.5mg/5ml liq, aluminum & magnesium hydroxide-simethicone (Maalox), LIDOcaine viscous 2%, Swish and spit 10 mLs every 4 (four) hours as needed (sore throat)., Disp: 180 mL, Rfl: 0    [START ON 4/4/2024] estradiol 0.05 mg/24 hr td ptsw (VIVELLE-DOT) 0.05 mg/24 hr, Place 1 patch onto the skin twice a week., Disp: 8 patch, Rfl: 6    progesterone (PROMETRIUM) 100 MG capsule, Take 1 capsule (100 mg total) by mouth nightly., Disp: 30 capsule, Rfl: 6    Review of Systems:  General: No fever, chills, or weight loss.  Chest: No chest pain, shortness of breath, or palpitations.  Breast: No pain, masses, or nipple discharge.  Vulva: No pain, lesions, or itching.  Vagina: No relaxation, itching, discharge, or lesions.  Abdomen: No pain, nausea, vomiting, diarrhea, or constipation.  Urinary: No incontinence, nocturia, frequency, or dysuria.  Extremities:  No leg cramps, edema, or calf pain.  Neurologic: No headaches, dizziness, or visual changes.    Vitals:    04/03/24 0948   BP: (!) 160/88   Weight: 79.7 kg (175 lb 9.6 oz)   Height: 5' 4" (1.626 m)   PainSc: 0-No pain     Body mass index is 30.14 kg/m².    Assessment:    Menopausal syndrome  -     Ambulatory referral/consult to Cardiology; Future; Expected date: 04/10/2024  -     Follicle Stimulating Hormone; Future; Expected date: 04/03/2024  -     Estradiol; Future; Expected date: 04/03/2024  -     Testosterone, Free; Future; Expected date: 04/03/2024  -     Testosterone; Future; Expected date: 04/03/2024  -     estradiol 0.05 mg/24 hr td ptsw (VIVELLE-DOT) 0.05 mg/24 hr; Place 1 patch onto the skin twice a week.  Dispense: 8 patch; Refill: 6  -     progesterone (PROMETRIUM) 100 MG capsule; Take 1 capsule (100 mg total) by mouth nightly.  Dispense: 30 capsule; Refill: 6        Plan:   Risks and benefits of hormone replacement therapy were " discussed.  Hormone replacement therapy options, including bioidentical versus non-bioidentical hormones, as well as alternatives discussed.  We spent a significant amt of time discussing estrogen replacement theropy.  We discussed the risks and benefits including breast cancer and embolic risk, osteoporosis and heart and colon health benefits.  Pt understands that there are many different ways to take estrogen and many different doses and that she does not have to take long term unless she chooses.  She understands that if she still has her uterus, she will need to take progesterone with this to decrease risk of endometrial cancer.We discussed side effects that might include but not limited to headaches, edema, nausea.   We did discuss that transdermal option of estrogen is safer than oral estradiol as transdermal methods decrease risk for blood clots and stroke as they bypass liver metabolism. (In view of strong family hx of heart disease, will get Cardiology consult as well.   The 10-year ASCVD risk score (Christa ONEIL, et al., 2019) is: 4.8%    Values used to calculate the score:      Age: 58 years      Sex: Female      Is Non- : No      Diabetic: No      Tobacco smoker: No      Systolic Blood Pressure: 160 mmHg      Is BP treated: Yes      HDL Cholesterol: 69 mg/dL      Total Cholesterol: 204 mg/dL      The patient and I have discussed testosterone therapy and its risks and benefits.  The risks include increased increasing cholesterol levels, facial hair and other hair growth, acne, increased sized of clitoris, deepening of voice, anxiety as well as other side effects.  Benefits include increased energy level, increased libido, easier orgasm and potiential increased muscle mass.  Pt understands that different women have different amounts of risks and benefits to this med and that she can stop the medication at any time.          Follow up in 3 months  Will recheck labs once on typical  optimal dose or if having side effects.  Instructed patient to call if she experiences any side effects or has any questions.  I spent 31 minutes with this patient today, >50% counseling.

## 2024-04-04 ENCOUNTER — PATIENT MESSAGE (OUTPATIENT)
Dept: ADMINISTRATIVE | Facility: OTHER | Age: 59
End: 2024-04-04
Payer: COMMERCIAL

## 2024-04-04 ENCOUNTER — TELEPHONE (OUTPATIENT)
Dept: ADMINISTRATIVE | Facility: OTHER | Age: 59
End: 2024-04-04
Payer: COMMERCIAL

## 2024-04-04 NOTE — TELEPHONE ENCOUNTER
MARISELA with scheduled Cardiology appointment of 5-, and contact number provided for any questions. My Chart message to be sent.

## 2024-04-08 LAB — TESTOST FREE SERPL-MCNC: 1 PG/ML

## 2024-04-23 DIAGNOSIS — N95.1 MENOPAUSAL SYNDROME: ICD-10-CM

## 2024-04-23 RX ORDER — ESTRADIOL 0.05 MG/D
1 FILM, EXTENDED RELEASE TRANSDERMAL
Qty: 24 PATCH | Refills: 1 | Status: SHIPPED | OUTPATIENT
Start: 2024-04-25

## 2024-04-23 NOTE — TELEPHONE ENCOUNTER
Refill Routing Note   Medication(s) are not appropriate for processing by [facility] for the following reason(s):        Outside of protocol: HRT patches are currently not covered by [facility] protocols   Required vitals abnormal: 160/88     [facility] action(s):  Route               Appointments  past 12m or future 3m with PCP    Date Provider   Last Visit   4/3/2024 Dasia Brandt MD   Next Visit   7/29/2024 Dasia Brandt MD   ED visits in past 90 days: 0        Note composed:3:37 PM 04/23/2024

## 2024-04-24 DIAGNOSIS — Z78.0 MENOPAUSE: ICD-10-CM

## 2024-04-24 DIAGNOSIS — I10 ESSENTIAL HYPERTENSION: ICD-10-CM

## 2024-04-24 NOTE — TELEPHONE ENCOUNTER
No care due was identified.  [facility] Embedded Care Due Messages. Reference number: [id number].   4/24/2024 9:50:18 AM CDT

## 2024-04-25 ENCOUNTER — PATIENT MESSAGE (OUTPATIENT)
Dept: FAMILY MEDICINE | Facility: CLINIC | Age: 59
End: 2024-04-25
Payer: COMMERCIAL

## 2024-04-25 RX ORDER — ALPRAZOLAM 0.5 MG/1
TABLET ORAL
Qty: 30 TABLET | Refills: 0 | OUTPATIENT
Start: 2024-04-25

## 2024-04-25 RX ORDER — ATENOLOL 100 MG/1
100 TABLET ORAL DAILY
Qty: 90 TABLET | Refills: 3 | Status: SHIPPED | OUTPATIENT
Start: 2024-04-25

## 2024-04-25 RX ORDER — FLUOXETINE 10 MG/1
10 CAPSULE ORAL DAILY
Qty: 90 CAPSULE | Refills: 3 | Status: SHIPPED | OUTPATIENT
Start: 2024-04-25 | End: 2025-04-25

## 2024-05-15 DIAGNOSIS — E78.5 HYPERLIPIDEMIA, UNSPECIFIED HYPERLIPIDEMIA TYPE: ICD-10-CM

## 2024-05-15 NOTE — TELEPHONE ENCOUNTER
Care Due:                  Date            Visit Type   Department     Provider  --------------------------------------------------------------------------------                                EP [facility] FAMILY                              PRIMARY      MED/ INTERNAL  Last Visit: 08-      CARE ([facility])   MED/ PEDS      Yas Mcclendon  Next Visit: None Scheduled  None         None Found                                                            Last  Test          Frequency    Reason                     Performed    Due Date  --------------------------------------------------------------------------------    CMP.........  12 months..  atorvastatin.............  08- 07-    Lipid Panel.  12 months..  atorvastatin.............  08- 07-    [facility] Embedded Care Due Messages. Reference number: [id number].   5/15/2024 2:24:38 PM CDT

## 2024-05-16 RX ORDER — ATORVASTATIN CALCIUM 10 MG/1
10 TABLET, FILM COATED ORAL DAILY
Qty: 90 TABLET | Refills: 3 | Status: SHIPPED | OUTPATIENT
Start: 2024-05-16

## 2024-05-31 ENCOUNTER — PATIENT MESSAGE (OUTPATIENT)
Dept: CARDIOLOGY | Facility: CLINIC | Age: 59
End: 2024-05-31
Payer: COMMERCIAL

## 2024-06-03 ENCOUNTER — OFFICE VISIT (OUTPATIENT)
Dept: CARDIOLOGY | Facility: CLINIC | Age: 59
End: 2024-06-03
Payer: COMMERCIAL

## 2024-06-03 VITALS
DIASTOLIC BLOOD PRESSURE: 80 MMHG | BODY MASS INDEX: 30.14 KG/M2 | HEART RATE: 60 BPM | HEIGHT: 64 IN | WEIGHT: 176.56 LBS | SYSTOLIC BLOOD PRESSURE: 131 MMHG

## 2024-06-03 DIAGNOSIS — E78.01 FAMILIAL HYPERCHOLESTEROLEMIA: ICD-10-CM

## 2024-06-03 DIAGNOSIS — Z13.6 ENCOUNTER FOR SCREENING FOR CARDIOVASCULAR DISORDERS: ICD-10-CM

## 2024-06-03 DIAGNOSIS — E66.3 OVER WEIGHT: ICD-10-CM

## 2024-06-03 DIAGNOSIS — I10 ESSENTIAL HYPERTENSION: Primary | ICD-10-CM

## 2024-06-03 DIAGNOSIS — N95.1 MENOPAUSAL SYNDROME: ICD-10-CM

## 2024-06-03 PROCEDURE — 1160F RVW MEDS BY RX/DR IN RCRD: CPT | Mod: CPTII,S$GLB,, | Performed by: INTERNAL MEDICINE

## 2024-06-03 PROCEDURE — 99999 PR PBB SHADOW E&M-EST. PATIENT-LVL V: CPT | Mod: PBBFAC,,, | Performed by: INTERNAL MEDICINE

## 2024-06-03 PROCEDURE — 99204 OFFICE O/P NEW MOD 45 MIN: CPT | Mod: S$GLB,,, | Performed by: INTERNAL MEDICINE

## 2024-06-03 PROCEDURE — 3008F BODY MASS INDEX DOCD: CPT | Mod: CPTII,S$GLB,, | Performed by: INTERNAL MEDICINE

## 2024-06-03 PROCEDURE — 3075F SYST BP GE 130 - 139MM HG: CPT | Mod: CPTII,S$GLB,, | Performed by: INTERNAL MEDICINE

## 2024-06-03 PROCEDURE — 3079F DIAST BP 80-89 MM HG: CPT | Mod: CPTII,S$GLB,, | Performed by: INTERNAL MEDICINE

## 2024-06-03 PROCEDURE — 1159F MED LIST DOCD IN RCRD: CPT | Mod: CPTII,S$GLB,, | Performed by: INTERNAL MEDICINE

## 2024-06-03 NOTE — PROGRESS NOTES
Subjective:   Patient ID:  Brianna Coughlin is a 59 y.o. female is a new patient who presents for evaluation of Hypertension and Risk Factor Management For Atherosclerosis  HRT  consult  HTN, familial hypercholesteremia    Brianna Coughlin is a 58 y.o. female who presents to discuss hormone replacement therapy.  Menarche occurred at age 11 and the patient went into menopause at 54 years of age, which was 4 years ago. Patient is requesting hormone replacement therapy due to hot flashes, insomnia, moodiness, and vaginal dryness, as well as low libido. The patient is not taking hormone replacement therapy. Patient denies post-menopausal vaginal bleeding. The patient is sexually active.  She denies the following contraindications to HRT:  Vaginal bleeding, history of VTE/PE, thrombophilia,  breast cancer, or active liver disease.    HPI:   Mother has CVA and passed, Grandfather had a pacemaker, brothers had heart disease, Mother was diabetic and had open heart surgery  Patient was started on estrogen patch (hot flashes, sweat, insomnia)   No chest pain, Orthopnea, PND of heart failure symptoms.   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     2 - Trivial mitral regurgitation.     3 - Trivial tricuspid regurgitation.     No evidence of stress induced myocardial ischemia.       Patient Active Problem List   Diagnosis    Undiagnosed cardiac murmurs    Lumbago    Anxiety    GERD (gastroesophageal reflux disease)    Personal history of cervical dysplasia    Fibroids    Dermatophytosis of nail    Protruded lumbar disc (at Left L4-5)    Right lumbar radiculopathy    Chronic midline low back pain with right-sided sciatica    Fibromyalgia    Essential hypertension    Migraine syndrome    FHx: coronary artery disease    H/O: duodenal ulcer    Ang's neuroma of left foot 2nd space     "Weakness of right lower extremity    Difficulty walking    Decreased ROM of lumbar spine    Familial hypercholesterolemia    Epigastric abdominal pain    Celiac disease     /80   Pulse 60   Ht 5' 4" (1.626 m)   Wt 80.1 kg (176 lb 9.4 oz)   BMI 30.31 kg/m²   Body mass index is 30.31 kg/m².  CrCl cannot be calculated (Patient's most recent lab result is older than the maximum 7 days allowed.).    Lab Results   Component Value Date     08/02/2023    K 4.6 08/02/2023     08/02/2023    CO2 28 08/02/2023    BUN 16 08/02/2023    CREATININE 0.9 08/02/2023     08/02/2023    HGBA1C 5.3 08/02/2023    AST 16 08/02/2023    ALT 18 08/02/2023    ALBUMIN 3.8 08/02/2023    PROT 6.9 08/02/2023    BILITOT 0.6 08/02/2023    WBC 5.94 08/02/2023    HGB 13.6 08/02/2023    HCT 41.7 08/02/2023    MCV 90 08/02/2023     08/02/2023    TSH 1.447 08/02/2023    CHOL 204 (H) 08/02/2023    HDL 69 08/02/2023    LDLCALC 110.0 08/02/2023    TRIG 125 08/02/2023       Current Outpatient Medications   Medication Sig    acetaminophen (TYLENOL) 500 MG tablet Take 1-2 tablets (500-1,000 mg total) by mouth 3 (three) times daily as needed for Pain.    ALPRAZolam (XANAX) 0.5 MG tablet TAKE 1 TABLET BY MOUTH EVERY DAY AS NEEDED    atenoloL (TENORMIN) 100 MG tablet Take 1 tablet (100 mg total) by mouth once daily.    atorvastatin (LIPITOR) 10 MG tablet Take 1 tablet (10 mg total) by mouth once daily.    diclofenac sodium (VOLTAREN) 1 % Gel Apply 2 g topically 4 (four) times daily as needed (joint pain).    estradiol 0.05 mg/24 hr td ptsw (VIVELLE-DOT) 0.05 mg/24 hr APPLY 1 PATCH TWICE A WEEK    FLUoxetine 10 MG capsule Take 1 capsule (10 mg total) by mouth once daily.    multivitamin (THERAGRAN) per tablet Take 1 tablet by mouth once daily.    omeprazole (PRILOSEC) 40 MG capsule Take 1 capsule (40 mg total) by mouth once daily.    progesterone (PROMETRIUM) 100 MG capsule Take 1 capsule (100 mg total) by mouth nightly.     No " current facility-administered medications for this visit.       Review of Systems   Constitutional: Negative for chills, decreased appetite, malaise/fatigue, night sweats, weight gain and weight loss.   Eyes:  Negative for blurred vision, double vision, visual disturbance and visual halos.   Cardiovascular:  Positive for palpitations. Negative for chest pain, claudication, cyanosis, dyspnea on exertion, irregular heartbeat, leg swelling, near-syncope, orthopnea, paroxysmal nocturnal dyspnea and syncope.   Respiratory:  Negative for cough, hemoptysis, snoring, sputum production and wheezing.    Endocrine: Negative for cold intolerance, heat intolerance, polydipsia and polyphagia.   Hematologic/Lymphatic: Negative for adenopathy and bleeding problem. Does not bruise/bleed easily.   Skin:  Negative for flushing, itching, poor wound healing and rash.   Musculoskeletal:  Negative for arthritis, back pain, falls, gout, joint pain, joint swelling, muscle cramps, muscle weakness, myalgias, neck pain and stiffness.   Gastrointestinal:  Negative for bloating, abdominal pain, anorexia, diarrhea, dysphagia, excessive appetite, flatus, hematemesis, jaundice, melena and nausea.   Genitourinary:  Negative for hesitancy and incomplete emptying.   Neurological:  Negative for aphonia, brief paralysis, difficulty with concentration, disturbances in coordination, excessive daytime sleepiness, dizziness, focal weakness, light-headedness, loss of balance and weakness.   Psychiatric/Behavioral:  Negative for altered mental status, depression, hallucinations, hypervigilance, memory loss, substance abuse and suicidal ideas. The patient does not have insomnia and is not nervous/anxious.        Objective:   Physical Exam  Constitutional:       General: She is not in acute distress.     Appearance: She is well-developed. She is not diaphoretic.   HENT:      Head: Normocephalic and atraumatic.      Nose: Nose normal.      Mouth/Throat:       Pharynx: No oropharyngeal exudate.   Eyes:      General: No scleral icterus.        Right eye: No discharge.         Left eye: No discharge.      Conjunctiva/sclera: Conjunctivae normal.      Pupils: Pupils are equal, round, and reactive to light.   Neck:      Thyroid: No thyromegaly.      Vascular: No JVD.      Trachea: No tracheal deviation.   Cardiovascular:      Rate and Rhythm: Normal rate and regular rhythm.      Pulses: Intact distal pulses.      Heart sounds: Normal heart sounds. No murmur heard.     No friction rub. No gallop.   Pulmonary:      Effort: Pulmonary effort is normal. No respiratory distress.      Breath sounds: Normal breath sounds. No stridor. No wheezing or rales.   Chest:      Chest wall: No tenderness.   Abdominal:      General: Bowel sounds are normal. There is no distension.      Palpations: Abdomen is soft. There is no mass.      Tenderness: There is no abdominal tenderness. There is no guarding or rebound.   Musculoskeletal:         General: No tenderness. Normal range of motion.      Cervical back: Normal range of motion and neck supple.   Lymphadenopathy:      Cervical: No cervical adenopathy.   Skin:     General: Skin is warm.      Coloration: Skin is not pale.      Findings: No erythema or rash.   Neurological:      Mental Status: She is alert and oriented to person, place, and time.      Cranial Nerves: No cranial nerve deficit.      Motor: No abnormal muscle tone.      Coordination: Coordination normal.      Deep Tendon Reflexes: Reflexes are normal and symmetric. Reflexes normal.   Psychiatric:         Behavior: Behavior normal.         Thought Content: Thought content normal.         Judgment: Judgment normal.         Assessment:     1. Essential hypertension    2. Menopausal syndrome    3. Familial hypercholesterolemia    4. Encounter for screening for cardiovascular disorders    5. Over weight        Plan:   Brianna was seen today for hypertension and risk factor management  for atherosclerosis.    Diagnoses and all orders for this visit:    Essential hypertension  -     IN OFFICE EKG 12-LEAD (to Muse)    Menopausal syndrome  -     Ambulatory referral/consult to Cardiology  -     IN OFFICE EKG 12-LEAD (to Muse)    Familial hypercholesterolemia    Encounter for screening for cardiovascular disorders  -     LIPOPROTEIN A (LPA); Future  -     CT Calcium Scoring Cardiac; Future  -     Lipid Panel; Future  -     CRP, High Sensitivity; Future    Over weight      Risk stratification for CAD  Counseled on importance of heart healthy diet low in saturated and trans fat and salt as well gradually starting a regular aerobic exercise regimen with goal of 30min 5x/week. Recommend BP diary. Call if systolic BP > 130 mmHg on checking repeatedly

## 2024-06-10 ENCOUNTER — HOSPITAL ENCOUNTER (OUTPATIENT)
Dept: RADIOLOGY | Facility: HOSPITAL | Age: 59
Discharge: HOME | End: 2024-06-10
Attending: INTERNAL MEDICINE
Payer: COMMERCIAL

## 2024-06-10 DIAGNOSIS — Z13.6 ENCOUNTER FOR SCREENING FOR CARDIOVASCULAR DISORDERS: ICD-10-CM

## 2024-06-10 PROCEDURE — 75571 CT HRT W/O DYE W/CA TEST: CPT | Mod: 26,,, | Performed by: RADIOLOGY

## 2024-06-10 PROCEDURE — 75571 CT HRT W/O DYE W/CA TEST: CPT | Mod: TC

## 2024-07-10 ENCOUNTER — LAB VISIT (OUTPATIENT)
Dept: LAB | Facility: HOSPITAL | Age: 59
End: 2024-07-10
Attending: FAMILY MEDICINE
Payer: COMMERCIAL

## 2024-07-10 ENCOUNTER — OFFICE VISIT (OUTPATIENT)
Dept: FAMILY MEDICINE | Facility: CLINIC | Age: 59
End: 2024-07-10
Payer: COMMERCIAL

## 2024-07-10 VITALS
BODY MASS INDEX: 29.96 KG/M2 | HEIGHT: 64 IN | DIASTOLIC BLOOD PRESSURE: 80 MMHG | WEIGHT: 175.5 LBS | SYSTOLIC BLOOD PRESSURE: 120 MMHG | HEART RATE: 59 BPM | OXYGEN SATURATION: 98 %

## 2024-07-10 DIAGNOSIS — Z00.00 ANNUAL PHYSICAL EXAM: Primary | ICD-10-CM

## 2024-07-10 DIAGNOSIS — F41.9 ANXIETY: ICD-10-CM

## 2024-07-10 DIAGNOSIS — I10 ESSENTIAL HYPERTENSION: ICD-10-CM

## 2024-07-10 DIAGNOSIS — Z00.00 ANNUAL PHYSICAL EXAM: ICD-10-CM

## 2024-07-10 LAB
ALBUMIN SERPL BCP-MCNC: 3.7 G/DL (ref 3.5–5.2)
ALP SERPL-CCNC: 45 U/L (ref 55–135)
ALT SERPL W/O P-5'-P-CCNC: 15 U/L (ref 10–44)
ANION GAP SERPL CALC-SCNC: 6 MMOL/L (ref 8–16)
AST SERPL-CCNC: 13 U/L (ref 10–40)
BASOPHILS # BLD AUTO: 0.05 K/UL (ref 0–0.2)
BASOPHILS NFR BLD: 0.8 % (ref 0–1.9)
BILIRUB SERPL-MCNC: 0.6 MG/DL (ref 0.1–1)
BUN SERPL-MCNC: 13 MG/DL (ref 6–20)
CALCIUM SERPL-MCNC: 9.7 MG/DL (ref 8.7–10.5)
CHLORIDE SERPL-SCNC: 105 MMOL/L (ref 95–110)
CO2 SERPL-SCNC: 29 MMOL/L (ref 23–29)
CREAT SERPL-MCNC: 0.9 MG/DL (ref 0.5–1.4)
DIFFERENTIAL METHOD BLD: ABNORMAL
EOSINOPHIL # BLD AUTO: 0.2 K/UL (ref 0–0.5)
EOSINOPHIL NFR BLD: 3.2 % (ref 0–8)
ERYTHROCYTE [DISTWIDTH] IN BLOOD BY AUTOMATED COUNT: 13 % (ref 11.5–14.5)
EST. GFR  (NO RACE VARIABLE): >60 ML/MIN/1.73 M^2
ESTIMATED AVG GLUCOSE: 105 MG/DL (ref 68–131)
GLUCOSE SERPL-MCNC: 104 MG/DL (ref 70–110)
HBA1C MFR BLD: 5.3 % (ref 4–5.6)
HCT VFR BLD AUTO: 44.5 % (ref 37–48.5)
HGB BLD-MCNC: 14 G/DL (ref 12–16)
IMM GRANULOCYTES # BLD AUTO: 0.02 K/UL (ref 0–0.04)
IMM GRANULOCYTES NFR BLD AUTO: 0.3 % (ref 0–0.5)
LYMPHOCYTES # BLD AUTO: 1.5 K/UL (ref 1–4.8)
LYMPHOCYTES NFR BLD: 23.1 % (ref 18–48)
MCH RBC QN AUTO: 29.5 PG (ref 27–31)
MCHC RBC AUTO-ENTMCNC: 31.5 G/DL (ref 32–36)
MCV RBC AUTO: 94 FL (ref 82–98)
MONOCYTES # BLD AUTO: 0.7 K/UL (ref 0.3–1)
MONOCYTES NFR BLD: 10.6 % (ref 4–15)
NEUTROPHILS # BLD AUTO: 4 K/UL (ref 1.8–7.7)
NEUTROPHILS NFR BLD: 62 % (ref 38–73)
NRBC BLD-RTO: 0 /100 WBC
PLATELET # BLD AUTO: 297 K/UL (ref 150–450)
PMV BLD AUTO: 10.9 FL (ref 9.2–12.9)
POTASSIUM SERPL-SCNC: 4.4 MMOL/L (ref 3.5–5.1)
PROT SERPL-MCNC: 6.8 G/DL (ref 6–8.4)
RBC # BLD AUTO: 4.74 M/UL (ref 4–5.4)
SODIUM SERPL-SCNC: 140 MMOL/L (ref 136–145)
TSH SERPL DL<=0.005 MIU/L-ACNC: 0.95 UIU/ML (ref 0.4–4)
WBC # BLD AUTO: 6.49 K/UL (ref 3.9–12.7)

## 2024-07-10 PROCEDURE — 99396 PREV VISIT EST AGE 40-64: CPT | Mod: S$GLB,,, | Performed by: FAMILY MEDICINE

## 2024-07-10 PROCEDURE — 99999 PR PBB SHADOW E&M-EST. PATIENT-LVL IV: CPT | Mod: PBBFAC,,, | Performed by: FAMILY MEDICINE

## 2024-07-10 PROCEDURE — 85025 COMPLETE CBC W/AUTO DIFF WBC: CPT | Performed by: FAMILY MEDICINE

## 2024-07-10 PROCEDURE — 83036 HEMOGLOBIN GLYCOSYLATED A1C: CPT | Performed by: FAMILY MEDICINE

## 2024-07-10 PROCEDURE — 3079F DIAST BP 80-89 MM HG: CPT | Mod: CPTII,S$GLB,, | Performed by: FAMILY MEDICINE

## 2024-07-10 PROCEDURE — 36415 COLL VENOUS BLD VENIPUNCTURE: CPT | Mod: PO | Performed by: FAMILY MEDICINE

## 2024-07-10 PROCEDURE — 3074F SYST BP LT 130 MM HG: CPT | Mod: CPTII,S$GLB,, | Performed by: FAMILY MEDICINE

## 2024-07-10 PROCEDURE — 80053 COMPREHEN METABOLIC PANEL: CPT | Performed by: FAMILY MEDICINE

## 2024-07-10 PROCEDURE — 84443 ASSAY THYROID STIM HORMONE: CPT | Performed by: FAMILY MEDICINE

## 2024-07-10 PROCEDURE — 3008F BODY MASS INDEX DOCD: CPT | Mod: CPTII,S$GLB,, | Performed by: FAMILY MEDICINE

## 2024-07-10 PROCEDURE — 1159F MED LIST DOCD IN RCRD: CPT | Mod: CPTII,S$GLB,, | Performed by: FAMILY MEDICINE

## 2024-07-10 RX ORDER — ALPRAZOLAM 0.5 MG/1
TABLET ORAL
Qty: 30 TABLET | Refills: 0 | Status: SHIPPED | OUTPATIENT
Start: 2024-07-10

## 2024-07-10 NOTE — PROGRESS NOTES
Assessment & Plan  Problem List Items Addressed This Visit          Psychiatric    Anxiety    Relevant Medications    ALPRAZolam (XANAX) 0.5 MG tablet       Cardiac/Vascular    Essential hypertension     Other Visit Diagnoses       Annual physical exam    -  Primary    Relevant Orders    Comprehensive Metabolic Panel    CBC Auto Differential    Hemoglobin A1C    TSH         I addressed all major concerns as it related to health maintenance.  All were ordered and scheduled based on the patients wishes.  Any additional health maintenance will be readdressed at the next physical if declined or deferred by the patient.    Assessment & Plan  1. Annual physical exam.  Blood pressure and pulse readings are within normal range. The physical exam shows clear lungs, good heart sounds, and normal strength. Blood work will be conducted today as part of the annual check-up to assess for any potential health concerns. A follow-up appointment will be scheduled for the subsequent year.    2. Hormone replacement therapy.  She is currently on a mild dose of hormones prescribed by another doctor. She reports improvement in symptoms, including reduced hot flashes and night sweats. She will continue with the current hormone therapy. A follow-up with her hormone therapy provider is scheduled for September.    3. Shaking episodes.  She experienced shaking in her hands and feeling unwell, which resolved after eating and drinking. This may be due to dehydration or low blood sugar. Blood work will be conducted today to check for diabetes, given her family history.    4. Celiac disease.  She occasionally experiences nausea and vomiting, likely related to her celiac disease when consuming gluten. No excessive symptoms reported. She is advised to continue avoiding gluten-containing foods.    5. Health Maintenance.  She had a normal mammogram and will get a Pap smear during her next visit with her hormone therapy provider. She saw a  cardiologist due to family history and hormone therapy concerns, and her calcium score was zero, indicating no plaque buildup.      Results  Imaging  Calcium score is zero.      Health Maintenance reviewed.      ______________________________________________________________________    Chief Complaint  Chief Complaint   Patient presents with    Annual Exam       HPI  Brianna Coughlin is a 59 y.o. female with multiple medical diagnoses as listed in the medical history and problem list that presents for annual exam.  Pt is known to me with last appointment 8/2/2023.    History of Present Illness  The patient presents for an annual physical exam.    She last visited a physician in 04/2024, who initiated her on a low-dose hormone regimen. Her hot flashes have improved with hormone replacement therapy. She reports occasional night sweats, which she attributes to her hormone therapy, and notes that these sweats have been improving over the past few months.    She experienced two episodes of hand tremors and a feeling of unease, which she suspects may be due to dehydration from diarrhea. These symptoms subsided approximately 45 minutes after eating and drinking. She also experiences nausea and vomiting, which she believes are related to her celiac disease. She denies any unusual rashes or new allergies.    Given her family history of diabetes, she is concerned about this possibility. She denies any recent falls.    Due to her family history, she consulted a cardiologist who found no significant plaque buildup. She plans to follow up with the cardiologist in 09/2024.    She has completed her mammogram and intends to have a Pap smear during her next visit. She anticipates that the hormone regimen will eventually lead to the discontinuation of her current mood medication.   She has a family history of diabetes. She has a family history of heart disease.           PAST MEDICAL HISTORY:  Past Medical History:   Diagnosis Date     Abnormal Pap smear     Anxiety     Breast cyst     Celiac disease     Dyspepsia     Dysplasia of cervix     Fibroids     GERD (gastroesophageal reflux disease)     Hypertension     Ovarian cyst        PAST SURGICAL HISTORY:  Past Surgical History:   Procedure Laterality Date    CHOLECYSTECTOMY      COLONOSCOPY      COLONOSCOPY N/A 11/22/2022    Procedure: COLONOSCOPY;  Surgeon: Gissell Dominguez MD;  Location: [facility] ([address] [place] [address]);  Service: Gastroenterology;  Laterality: N/A;  Fully vaccinated.EC    conization of cervix      ESOPHAGOGASTRODUODENOSCOPY N/A 08/06/2020    Procedure: EGD (ESOPHAGOGASTRODUODENOSCOPY);  Surgeon: Renard Gallegos MD;  Location: [facility] ([address] [place] [address]);  Service: Endoscopy;  Laterality: N/A;    TUBAL LIGATION         SOCIAL HISTORY:  Social History     Socioeconomic History    Marital status: Single   Tobacco Use    Smoking status: Never    Smokeless tobacco: Never   Substance and Sexual Activity    Alcohol use: Yes     Alcohol/week: 6.0 standard drinks of alcohol     Types: 6 Cans of beer per week     Comment: socially    Drug use: No    Sexual activity: Yes     Partners: Male     Birth control/protection: None     Comment: 17 years     Social Determinants of Health     Financial Resource Strain: Low Risk  (6/3/2024)    Overall Financial Resource Strain (CARDIA)     Difficulty of Paying Living Expenses: Not very hard   Food Insecurity: No Food Insecurity (6/3/2024)    Hunger Vital Sign     Worried About Running Out of Food in the Last Year: Never true     Ran Out of Food in the Last Year: Never true   Transportation Needs: No Transportation Needs (7/13/2021)    [facility], [facility]    PRAPARE - Transportation     Lack of Transportation (Medical): No     Lack of Transportation (Non-Medical): No   Physical Activity: Insufficiently Active (6/3/2024)    Exercise Vital Sign     Days of Exercise per Week: 3 days     Minutes of Exercise per Session: 30 min   Stress: No Stress Concern  Present (6/3/2024)    [employer] - Occupational Stress Questionnaire     Feeling of Stress : Only a little   Housing Stability: Unknown (6/3/2024)    Housing Stability Vital Sign     Unable to Pay for Housing in the Last Year: No       FAMILY HISTORY:  Family History   Problem Relation Name Age of Onset    Stroke Maternal Grandmother      Heart disease Maternal Grandfather          had pacemaker    Heart disease Father  55    Diabetes Mother      Hypertension Mother      Heart disease Mother  55    Heart disease Brother  35    Diabetes Brother      Colon cancer Cousin  60    Heart attacks under age 50 Maternal Uncle  45    Heart attacks under age 50 Maternal Uncle          had heart transplant    Breast cancer Neg Hx         ALLERGIES AND MEDICATIONS: updated and reviewed.  Review of patient's allergies indicates:   Allergen Reactions    Acetaminophen-codeine Other (See Comments)     Stomach pains    Iodinated contrast media Anaphylaxis    Mobic [meloxicam] Other (See Comments)     STOMACH ULCERS    Iodine Other (See Comments)    Penicillin Hives    Sulfa (sulfonamide antibiotics) Nausea And Vomiting     Other reaction(s): Rash    Unable to assess      MEDICINES CONTAINING ASPIRIN AFFECT HER STOMACH/STOMACH ULCER    Codeine Rash    Penicillins Nausea And Vomiting and Rash     Current Outpatient Medications   Medication Sig Dispense Refill    acetaminophen (TYLENOL) 500 MG tablet Take 1-2 tablets (500-1,000 mg total) by mouth 3 (three) times daily as needed for Pain.  0    atenoloL (TENORMIN) 100 MG tablet Take 1 tablet (100 mg total) by mouth once daily. 90 tablet 3    atorvastatin (LIPITOR) 10 MG tablet Take 1 tablet (10 mg total) by mouth once daily. 90 tablet 3    diclofenac sodium (VOLTAREN) 1 % Gel Apply 2 g topically 4 (four) times daily as needed (joint pain). 300 g 2    estradiol 0.05 mg/24 hr td ptsw (VIVELLE-DOT) 0.05 mg/24 hr APPLY 1 PATCH TWICE A WEEK 24 patch 1     "FLUoxetine 10 MG capsule Take 1 capsule (10 mg total) by mouth once daily. 90 capsule 3    multivitamin (THERAGRAN) per tablet Take 1 tablet by mouth once daily.      omeprazole (PRILOSEC) 40 MG capsule Take 1 capsule (40 mg total) by mouth once daily. 90 capsule 2    progesterone (PROMETRIUM) 100 MG capsule Take 1 capsule (100 mg total) by mouth nightly. 30 capsule 6    ALPRAZolam (XANAX) 0.5 MG tablet TAKE 1 TABLET BY MOUTH EVERY DAY AS NEEDED 30 tablet 0     No current facility-administered medications for this visit.         ROS  Review of Systems   Constitutional:  Negative for activity change, appetite change, fatigue, fever and unexpected weight change.   HENT: Negative.  Negative for ear discharge, ear pain, rhinorrhea and sore throat.    Eyes: Negative.    Respiratory:  Negative for apnea, cough, chest tightness, shortness of breath and wheezing.    Cardiovascular:  Negative for chest pain, palpitations and leg swelling.   Gastrointestinal:  Positive for nausea and vomiting. Negative for abdominal distention, abdominal pain, constipation and diarrhea.   Endocrine: Negative for cold intolerance, heat intolerance, polydipsia and polyuria.   Genitourinary:  Negative for decreased urine volume and urgency.   Musculoskeletal: Negative.    Skin:  Negative for rash.   Neurological:  Negative for dizziness and headaches.   Hematological:  Does not bruise/bleed easily.   Psychiatric/Behavioral:  Negative for agitation, sleep disturbance and suicidal ideas.            Physical Exam  Vitals:    07/10/24 0718   BP: 120/80   Pulse: (!) 59   SpO2: 98%   Weight: 79.6 kg (175 lb 7.8 oz)   Height: 5' 4" (1.626 m)    Body mass index is 30.12 kg/m².  Weight: 79.6 kg (175 lb 7.8 oz)   Height: 5' 4" (162.6 cm)   Physical Exam  Vitals reviewed.   Constitutional:       Appearance: Normal appearance. She is well-developed.   HENT:      Head: Normocephalic and atraumatic.      Right Ear: External ear normal.      Left Ear: External " ear normal.      Nose: Nose normal.      Mouth/Throat:      Mouth: Mucous membranes are moist.      Pharynx: Oropharynx is clear.   Eyes:      Extraocular Movements: Extraocular movements intact.      Conjunctiva/sclera: Conjunctivae normal.      Pupils: Pupils are equal, round, and reactive to light.   Cardiovascular:      Rate and Rhythm: Normal rate and regular rhythm.      Heart sounds: Normal heart sounds.   Pulmonary:      Effort: Pulmonary effort is normal.      Breath sounds: Normal breath sounds.   Skin:     General: Skin is warm and dry.   Neurological:      Mental Status: She is alert and oriented to person, place, and time.        Physical Exam  Clear lung fields.  Normal heart sounds.         Health Maintenance         Date Due Completion Date    Influenza Vaccine (1) 09/01/2024 10/17/2023    Override on 10/8/2019: Done (at her job)    Mammogram 04/01/2025 4/1/2024    Cervical Cancer Screening 06/17/2025 6/17/2020    Hemoglobin A1c (Diabetic Prevention Screening) 08/02/2026 8/2/2023    Lipid Panel 08/02/2028 8/2/2023    TETANUS VACCINE 05/28/2030 5/28/2020    Colorectal Cancer Screening 11/22/2032 11/22/2022    Override on 7/12/2013: Done                Patient note was created using [employer].  Any errors in syntax or even information may not have been identified and edited on initial review prior to signing this note.

## 2024-07-16 ENCOUNTER — TELEPHONE (OUTPATIENT)
Dept: CARDIOLOGY | Facility: CLINIC | Age: 59
End: 2024-07-16
Payer: COMMERCIAL

## 2024-07-18 ENCOUNTER — PATIENT MESSAGE (OUTPATIENT)
Dept: CARDIOLOGY | Facility: CLINIC | Age: 59
End: 2024-07-18
Payer: COMMERCIAL

## 2024-07-18 ENCOUNTER — TELEPHONE (OUTPATIENT)
Dept: CARDIOLOGY | Facility: CLINIC | Age: 59
End: 2024-07-18
Payer: COMMERCIAL

## 2024-07-18 NOTE — TELEPHONE ENCOUNTER
Patient called to change appt from 9/16 due to a bookout.  No answer.  LVM and portal message sent.  Appt changed to a different provider on the same day.

## 2024-08-09 ENCOUNTER — TELEPHONE (OUTPATIENT)
Dept: OBSTETRICS AND GYNECOLOGY | Facility: CLINIC | Age: 59
End: 2024-08-09
Payer: COMMERCIAL

## 2024-08-09 DIAGNOSIS — N95.1 MENOPAUSAL SYNDROME: Primary | ICD-10-CM

## 2024-08-26 ENCOUNTER — OFFICE VISIT (OUTPATIENT)
Dept: OBSTETRICS AND GYNECOLOGY | Facility: CLINIC | Age: 59
End: 2024-08-26
Payer: COMMERCIAL

## 2024-08-26 VITALS
HEIGHT: 64 IN | SYSTOLIC BLOOD PRESSURE: 162 MMHG | WEIGHT: 177.94 LBS | DIASTOLIC BLOOD PRESSURE: 86 MMHG | BODY MASS INDEX: 30.38 KG/M2

## 2024-08-26 DIAGNOSIS — Z01.419 ENCOUNTER FOR GYNECOLOGICAL EXAMINATION WITHOUT ABNORMAL FINDING: Primary | ICD-10-CM

## 2024-08-26 DIAGNOSIS — Z12.4 PAP SMEAR FOR CERVICAL CANCER SCREENING: ICD-10-CM

## 2024-08-26 DIAGNOSIS — N95.1 MENOPAUSAL SYNDROME: ICD-10-CM

## 2024-08-26 PROCEDURE — 87624 HPV HI-RISK TYP POOLED RSLT: CPT | Performed by: OBSTETRICS & GYNECOLOGY

## 2024-08-26 PROCEDURE — 3077F SYST BP >= 140 MM HG: CPT | Mod: CPTII,S$GLB,, | Performed by: OBSTETRICS & GYNECOLOGY

## 2024-08-26 PROCEDURE — 3079F DIAST BP 80-89 MM HG: CPT | Mod: CPTII,S$GLB,, | Performed by: OBSTETRICS & GYNECOLOGY

## 2024-08-26 PROCEDURE — 99396 PREV VISIT EST AGE 40-64: CPT | Mod: S$GLB,,, | Performed by: OBSTETRICS & GYNECOLOGY

## 2024-08-26 PROCEDURE — 3044F HG A1C LEVEL LT 7.0%: CPT | Mod: CPTII,S$GLB,, | Performed by: OBSTETRICS & GYNECOLOGY

## 2024-08-26 PROCEDURE — 3008F BODY MASS INDEX DOCD: CPT | Mod: CPTII,S$GLB,, | Performed by: OBSTETRICS & GYNECOLOGY

## 2024-08-26 PROCEDURE — 99999 PR PBB SHADOW E&M-EST. PATIENT-LVL III: CPT | Mod: PBBFAC,,, | Performed by: OBSTETRICS & GYNECOLOGY

## 2024-08-26 PROCEDURE — 1159F MED LIST DOCD IN RCRD: CPT | Mod: CPTII,S$GLB,, | Performed by: OBSTETRICS & GYNECOLOGY

## 2024-08-26 RX ORDER — PROGESTERONE 100 MG/1
100 CAPSULE ORAL NIGHTLY
Qty: 90 CAPSULE | Refills: 3 | Status: SHIPPED | OUTPATIENT
Start: 2024-08-26 | End: 2025-08-26

## 2024-08-26 RX ORDER — ESTRADIOL 0.05 MG/D
1 FILM, EXTENDED RELEASE TRANSDERMAL
Qty: 24 PATCH | Refills: 3 | Status: SHIPPED | OUTPATIENT
Start: 2024-08-26

## 2024-08-26 NOTE — PROGRESS NOTES
Subjective:       Patient ID: Brianna Coughlin is a 59 y.o. female.    Chief Complaint:  Follow-up (hormfu)      History of Present Illness  HPI    Brianna Coughlin is a 59 y.o. female  here for her annual GYN exam.  Menarche occurred at age 11 and the patient went into menopause at 54 years of age, which was 4 years ago. Patient requested and was placed on  hormone replacement therapy  in 2024 due to hot flashes, insomnia, moodiness, and vaginal dryness, as well as low libido.   She was placed on VIvelle Dot 0.05 twice weekly and Prometrium 100mg HS, and reports doing well, no longer has hot flashes, night sweats or insomnia. Vaginal dryness has improved.  Libido is slightly better, and she feels that it is good enough now. Declined addition of Testosterone.      denies break through bleeding.   denies vaginal itching or irritation.  Denies vaginal discharge.  She is sexually active. She has had1 partner for 17 years .     History of abnormal pap: Yes - had conization several years ago  Last Pap: approximate date 2023 and was normal(with Dr. Mckinnon)  Last MMG: normal-4-:BI-RADS Category 1: Negative -routine follow-up in 12 months  Last Colonoscopy:  colonoscopy 2 years ago without abnormalities.  denies domestic violence. She does feel safe at home.     Past Medical History:   Diagnosis Date    Abnormal Pap smear     Anxiety     Breast cyst     Celiac disease     Dyspepsia     Dysplasia of cervix     Fibroids     GERD (gastroesophageal reflux disease)     Hypertension     Ovarian cyst      Past Surgical History:   Procedure Laterality Date    CHOLECYSTECTOMY      COLONOSCOPY      COLONOSCOPY N/A 2022    Procedure: COLONOSCOPY;  Surgeon: Gissell Dominguez MD;  Location: [address];  Service: Gastroenterology;  Laterality: N/A;  Fully vaccinated.EC    conization of cervix      ESOPHAGOGASTRODUODENOSCOPY N/A 2020    Procedure: EGD (ESOPHAGOGASTRODUODENOSCOPY);   Surgeon: Renard Gallegos MD;  Location: [facility] ([address] [place] [address]);  Service: Endoscopy;  Laterality: N/A;    TUBAL LIGATION       Social History     Socioeconomic History    Marital status: Single   Tobacco Use    Smoking status: Never    Smokeless tobacco: Never   Substance and Sexual Activity    Alcohol use: Yes     Alcohol/week: 6.0 standard drinks of alcohol     Types: 6 Cans of beer per week     Comment: socially    Drug use: No    Sexual activity: Yes     Partners: Male     Birth control/protection: None     Comment: 17 years     Social Determinants of Health     Financial Resource Strain: Low Risk  (6/3/2024)    Overall Financial Resource Strain (CARDIA)     Difficulty of Paying Living Expenses: Not very hard   Food Insecurity: No Food Insecurity (6/3/2024)    Hunger Vital Sign     Worried About Running Out of Food in the Last Year: Never true     Ran Out of Food in the Last Year: Never true   Transportation Needs: No Transportation Needs (7/13/2021)    [facility]    PRAPARE - Transportation     Lack of Transportation (Medical): No     Lack of Transportation (Non-Medical): No   Physical Activity: Insufficiently Active (6/3/2024)    Exercise Vital Sign     Days of Exercise per Week: 3 days     Minutes of Exercise per Session: 30 min   Stress: No Stress Concern Present (6/3/2024)    [employer] - Occupational Stress Questionnaire     Feeling of Stress : Only a little   Housing Stability: Unknown (6/3/2024)    Housing Stability Vital Sign     Unable to Pay for Housing in the Last Year: No     Family History   Problem Relation Name Age of Onset    Stroke Maternal Grandmother      Heart disease Maternal Grandfather          had pacemaker    Heart disease Father  55    Diabetes Mother      Hypertension Mother      Heart disease Mother  55    Heart disease Brother  35    Diabetes Brother      Colon cancer Cousin  60    Heart attacks under age 50 Maternal Uncle  45     "Heart attacks under age 50 Maternal Uncle          had heart transplant    Breast cancer Neg Hx       OB History          2    Para   2    Term   2            AB        Living   2         SAB        IAB        Ectopic        Multiple        Live Births   2                 BP (!) 162/86   Ht 5' 4" (1.626 m)   Wt 80.7 kg (177 lb 14.6 oz)   LMP 2020 (Approximate)   BMI 30.54 kg/m²         GYN & OB History  Patient's last menstrual period was 2020 (approximate).   Date of Last Pap: 2024    OB History    Para Term  AB Living   2 2 2     2   SAB IAB Ectopic Multiple Live Births           2      # Outcome Date GA Lbr Binu/2nd Weight Sex Type Anes PTL Lv   2 Term      Vag-Spont   CAROLA   1 Term      Vag-Spont   CAROLA       Review of Systems  Review of Systems   Constitutional:  Negative for activity change, appetite change, fatigue and unexpected weight change.   HENT: Negative.     Eyes:  Negative for visual disturbance.   Respiratory:  Negative for shortness of breath and wheezing.    Cardiovascular:  Negative for chest pain, palpitations and leg swelling.   Gastrointestinal:  Negative for abdominal pain, bloating and blood in stool.   Endocrine: Negative for diabetes and hair loss.   Genitourinary:  Positive for decreased libido. Negative for dyspareunia, hot flashes and postmenopausal bleeding.   Musculoskeletal:  Negative for back pain and joint swelling.   Integumentary:  Negative for acne, hair changes and nipple discharge.   Neurological:  Negative for headaches.   Hematological:  Does not bruise/bleed easily.   Psychiatric/Behavioral:  Negative for depression and sleep disturbance. The patient is not nervous/anxious.    Breast: Negative for mastodynia and nipple discharge          Objective:      Physical Exam:   Constitutional: She is oriented to person, place, and time. She appears well-developed and well-nourished.    HENT:   Head: Normocephalic and atraumatic.  "   Eyes: Pupils are equal, round, and reactive to light. EOM are normal.     Cardiovascular:  Normal rate and regular rhythm.             Pulmonary/Chest: Effort normal and breath sounds normal.   BREASTS:  no mass, no tenderness, no deformity and no retraction. Right breast exhibits no inverted nipple, no mass, no nipple discharge, no skin change, no tenderness, no bleeding and no swelling. Left breast exhibits no inverted nipple, no mass, no nipple discharge, no skin change, no tenderness, no bleeding and no swelling. Breasts are symmetrical.              Abdominal: Soft. Bowel sounds are normal.     Genitourinary:    Pelvic exam was performed with patient supine.      Genitourinary Comments: PELVIC: Normal external genitalia without lesions.  Normal hair distribution.  Adequate perineal body, normal urethral meatus.  Vagina Moderately rugated, mildly atrophic without lesions or discharge.  Cervix pink,Parous, Scarred almost flat with Vault, without lesions, discharge or tenderness.  No significant cystocele or rectocele.  Bimanual exam shows uterus to be normal size, regular, mobile and nontender.  Adnexa without masses or tenderness.                   Musculoskeletal: Normal range of motion and moves all extremeties.       Neurological: She is alert and oriented to person, place, and time.    Skin: Skin is warm and dry.    Psychiatric: She has a normal mood and affect.              Assessment:        1. Encounter for gynecological examination without abnormal finding    2. Menopausal syndrome    3. Pap smear for cervical cancer screening                Plan:        Problem List Items Addressed This Visit    None  Visit Diagnoses       Encounter for gynecological examination without abnormal finding    -  Primary  COUNSELING:  The patient was counseled today on regular weight bearing exercise. Patient was counseled today on the new ACS guidelines for cervical cytology screening as well as the current  recommendations for breast cancer screening. Counseling session lasted approximately 10 minutes, and all her questions were answered. She was advised to see her primary care physician for all other health maintenance.   FOLLOW-UP with me for next routine visit.       Menopausal syndrome        Relevant Medications    estradiol 0.05 mg/24 hr td ptsw (VIVELLE-DOT) 0.05 mg/24 hr    progesterone (PROMETRIUM) 100 MG capsule    Pap smear for cervical cancer screening        Relevant Orders    Liquid-Based Pap Smear, Screening    HPV High Risk Genotypes, PCR             Follow up in about 1 year (around 8/26/2025), or if symptoms worsen or fail to improve.

## 2024-08-30 LAB
HPV HR 12 DNA SPEC QL NAA+PROBE: NEGATIVE
HPV16 AG SPEC QL: NEGATIVE
HPV18 DNA SPEC QL NAA+PROBE: NEGATIVE

## 2024-09-05 DIAGNOSIS — K21.9 GASTROESOPHAGEAL REFLUX DISEASE: ICD-10-CM

## 2024-09-06 RX ORDER — OMEPRAZOLE 40 MG/1
40 CAPSULE, DELAYED RELEASE ORAL DAILY
Qty: 90 CAPSULE | Refills: 2 | Status: SHIPPED | OUTPATIENT
Start: 2024-09-06

## 2024-09-06 NOTE — TELEPHONE ENCOUNTER
Care Due:                  Date            Visit Type   Department     Provider  --------------------------------------------------------------------------------                                 -         [facility]                              PRIMARY      MED/ INTERNAL  Last Visit: 07-      CARE ([facility])   MED/ PEDS      Yas Mcclendon                              [facility]                              PRIMARY      MED/ INTERNAL  Next Visit: 07-      CARE ([facility])   MED/ PEDS      Yas Mcclendon                                                            Last  Test          Frequency    Reason                     Performed    Due Date  --------------------------------------------------------------------------------    Lipid Panel.  12 months..  atorvastatin.............  08- 07-    [facility] Embedded Care Due Messages. Reference number: [id number].   9/05/2024 7:25:00 PM CDT

## 2024-09-09 ENCOUNTER — LAB VISIT (OUTPATIENT)
Dept: LAB | Facility: HOSPITAL | Age: 59
End: 2024-09-09
Attending: INTERNAL MEDICINE
Payer: COMMERCIAL

## 2024-09-09 DIAGNOSIS — Z13.6 ENCOUNTER FOR SCREENING FOR CARDIOVASCULAR DISORDERS: ICD-10-CM

## 2024-09-09 LAB
CHOLEST SERPL-MCNC: 204 MG/DL (ref 120–199)
CHOLEST/HDLC SERPL: 2.9 {RATIO} (ref 2–5)
CRP SERPL-MCNC: 1.11 MG/L (ref 0–3.19)
HDLC SERPL-MCNC: 71 MG/DL (ref 40–75)
HDLC SERPL: 34.8 % (ref 20–50)
LDLC SERPL CALC-MCNC: 117.4 MG/DL (ref 63–159)
NONHDLC SERPL-MCNC: 133 MG/DL
TRIGL SERPL-MCNC: 78 MG/DL (ref 30–150)

## 2024-09-09 PROCEDURE — 80061 LIPID PANEL: CPT | Performed by: INTERNAL MEDICINE

## 2024-09-09 PROCEDURE — 83695 ASSAY OF LIPOPROTEIN(A): CPT | Performed by: INTERNAL MEDICINE

## 2024-09-09 PROCEDURE — 86141 C-REACTIVE PROTEIN HS: CPT | Performed by: INTERNAL MEDICINE

## 2024-09-13 LAB — LPA SERPL-MCNC: <5 MG/DL (ref 0–30)

## 2024-09-16 ENCOUNTER — OFFICE VISIT (OUTPATIENT)
Dept: CARDIOLOGY | Facility: CLINIC | Age: 59
End: 2024-09-16
Payer: COMMERCIAL

## 2024-09-16 VITALS
WEIGHT: 174.63 LBS | HEART RATE: 65 BPM | DIASTOLIC BLOOD PRESSURE: 70 MMHG | OXYGEN SATURATION: 99 % | HEIGHT: 64 IN | BODY MASS INDEX: 29.81 KG/M2 | SYSTOLIC BLOOD PRESSURE: 124 MMHG

## 2024-09-16 DIAGNOSIS — Z79.890 MENOPAUSAL SYNDROME ON HORMONE REPLACEMENT THERAPY: ICD-10-CM

## 2024-09-16 DIAGNOSIS — E78.01 FAMILIAL HYPERCHOLESTEROLEMIA: Primary | ICD-10-CM

## 2024-09-16 DIAGNOSIS — N95.1 MENOPAUSAL SYNDROME ON HORMONE REPLACEMENT THERAPY: ICD-10-CM

## 2024-09-16 DIAGNOSIS — I10 ESSENTIAL HYPERTENSION: ICD-10-CM

## 2024-09-16 PROCEDURE — 3078F DIAST BP <80 MM HG: CPT | Mod: CPTII,S$GLB,, | Performed by: PHYSICIAN ASSISTANT

## 2024-09-16 PROCEDURE — 3074F SYST BP LT 130 MM HG: CPT | Mod: CPTII,S$GLB,, | Performed by: PHYSICIAN ASSISTANT

## 2024-09-16 PROCEDURE — 1159F MED LIST DOCD IN RCRD: CPT | Mod: CPTII,S$GLB,, | Performed by: PHYSICIAN ASSISTANT

## 2024-09-16 PROCEDURE — 99214 OFFICE O/P EST MOD 30 MIN: CPT | Mod: S$GLB,,, | Performed by: PHYSICIAN ASSISTANT

## 2024-09-16 PROCEDURE — 3008F BODY MASS INDEX DOCD: CPT | Mod: CPTII,S$GLB,, | Performed by: PHYSICIAN ASSISTANT

## 2024-09-16 PROCEDURE — 1160F RVW MEDS BY RX/DR IN RCRD: CPT | Mod: CPTII,S$GLB,, | Performed by: PHYSICIAN ASSISTANT

## 2024-09-16 PROCEDURE — 99999 PR PBB SHADOW E&M-EST. PATIENT-LVL IV: CPT | Mod: PBBFAC,,, | Performed by: PHYSICIAN ASSISTANT

## 2024-09-16 PROCEDURE — 3044F HG A1C LEVEL LT 7.0%: CPT | Mod: CPTII,S$GLB,, | Performed by: PHYSICIAN ASSISTANT

## 2024-10-07 ENCOUNTER — PATIENT MESSAGE (OUTPATIENT)
Dept: FAMILY MEDICINE | Facility: CLINIC | Age: 59
End: 2024-10-07
Payer: COMMERCIAL

## 2024-12-26 ENCOUNTER — OFFICE VISIT (OUTPATIENT)
Dept: FAMILY MEDICINE | Facility: CLINIC | Age: 59
End: 2024-12-26
Payer: COMMERCIAL

## 2024-12-26 VITALS
HEIGHT: 64 IN | SYSTOLIC BLOOD PRESSURE: 130 MMHG | HEART RATE: 60 BPM | BODY MASS INDEX: 30.85 KG/M2 | RESPIRATION RATE: 18 BRPM | OXYGEN SATURATION: 98 % | DIASTOLIC BLOOD PRESSURE: 74 MMHG | WEIGHT: 180.69 LBS | TEMPERATURE: 98 F

## 2024-12-26 DIAGNOSIS — J30.1 SEASONAL ALLERGIC RHINITIS DUE TO POLLEN: ICD-10-CM

## 2024-12-26 DIAGNOSIS — M26.642 ARTHRITIS OF LEFT TEMPOROMANDIBULAR JOINT: Primary | ICD-10-CM

## 2024-12-26 DIAGNOSIS — Z23 ENCOUNTER FOR PREVNAR PNEUMOCOCCAL VACCINATION: ICD-10-CM

## 2024-12-26 PROBLEM — Z78.0 MENOPAUSE: Status: ACTIVE | Noted: 2024-12-26

## 2024-12-26 PROCEDURE — 99999 PR PBB SHADOW E&M-EST. PATIENT-LVL V: CPT | Mod: PBBFAC,,,

## 2024-12-26 NOTE — ASSESSMENT & PLAN NOTE
Reports some chronic seasonal rhinorrhea and mild sinus pressure without headache, otalgia, sore throat, cough, fever.  -- prescribed nasal sprays as below and discussed proper administration of these medications after saline rinse.  Instructions reiterated in AVS

## 2024-12-26 NOTE — PATIENT INSTRUCTIONS
Temporomandibular Joint Pain  -- Stick with soft foods and gradually re-introduce hard/chewy foods as tolerable  -- Please perform the attached TMJ exercises once symptoms nupur to strengthen TMJ joint and reduce likelihood of future pain   -- Please also use a mouth guard at night just in case teeth grinding at night is contributory  -- Please let me know or visit the ED if symptoms don't improve or worsen over the next week, or if you are unable to tolerate food or liquid intake, or if you notice any fevers or neck stiffness.       Allergic Rhinitis  Use pre-bottled nasal saline at least once a day but as often as desired for comfort (if you are mixing your own solution, you MUST use either distilled water or boiled water that has been allowed to cool).  Blow your nose after you spray each nostril.      AFTER using the nasal saline, use the nasal steroid in the following manner:    Place the tip of the bottle into your nostril aiming towards the outside corner of each eye.  You may find it beneficial to use the opposite hand for the nostril that you are spraying. Do not aim the bottle straight up your nose. [place] the medicine but do not perform a hard sniff when you do.  If you can taste the medication, then you have sniffed too hard.  Dab any medication that drips out of your nose but do not blow your nose as this will expel the medication.

## 2024-12-26 NOTE — PROGRESS NOTES
Patient given pneumococcal vaccine via injection. 0 complaints of, tolerated well. Advised to wait in lobby 15 mins for adverse reaction. Verbalized understanding.

## 2024-12-26 NOTE — ASSESSMENT & PLAN NOTE
-- Reports 3 days ago chewed down on a hard piece of cazares and noted severe pain overlying the left TMJ with associated pain on chewing.    -- Notes some improvement since onset of symptoms, but has been avoiding hard/chewy food deliberately.   -- Denies any dysphagia with soft food, dental pain.  Does f/u regularly with a dentist.     -- No history of autoimmune disease.   -- Uncertain if she grinds teeth at night, but does note frequent jaw clenching   Chart review - no imaging of head or TMJ available  Exam: Mild TTP overlying the left TMJ  PLAN  -- TMJ exercises provided in AVS  -- Recommended mouthguard at night in case teeth grinding is contributory  -- She has Voltaren gel prescribed for knee arthritis - advised her to use this as needed on TMJ, and not to use it concurrently with more than 600 mg ibuprofen.   -- Advised patient to let me know or visit the ED if symptoms don't improve or worsen over the next week, or if she is unable to tolerate food or liquid intake, or if she notices any fevers or neck stiffness.

## 2024-12-27 RX ORDER — FLUTICASONE PROPIONATE 50 MCG
1 SPRAY, SUSPENSION (ML) NASAL DAILY
Qty: 15.8 ML | Refills: 5 | Status: SHIPPED | OUTPATIENT
Start: 2024-12-27

## 2024-12-27 RX ORDER — AZELASTINE 1 MG/ML
1 SPRAY, METERED NASAL 2 TIMES DAILY
Qty: 30 ML | Refills: 5 | Status: SHIPPED | OUTPATIENT
Start: 2024-12-27 | End: 2025-12-27

## 2024-12-27 NOTE — PROGRESS NOTES
Assessment & Plan      Arthritis of left temporomandibular joint - Primary     -- Reports 3 days ago chewed down on a hard piece of cazares and noted severe pain overlying the left TMJ with associated pain on chewing.    -- Notes some improvement since onset of symptoms, but has been avoiding hard/chewy food deliberately.   -- Denies any dysphagia with soft food, dental pain.  Does f/u regularly with a dentist.     -- No history of autoimmune disease.   -- Uncertain if she grinds teeth at night, but does note frequent jaw clenching   Chart review - no imaging of head or TMJ available  Exam: Mild TTP overlying the left TMJ  PLAN  -- TMJ exercises provided in AVS  -- Recommended mouthguard at night in case teeth grinding is contributory  -- She has Voltaren gel prescribed for knee arthritis - advised her to use this as needed on TMJ, and not to use it concurrently with more than 600 mg ibuprofen.   -- Advised patient to let me know or visit the ED if symptoms don't improve or worsen over the next week, or if she is unable to tolerate food or liquid intake, or if she notices any fevers or neck stiffness.                Seasonal allergic rhinitis due to pollen     Reports some chronic seasonal rhinorrhea and mild sinus pressure without headache, otalgia, sore throat, cough, fever.  -- prescribed nasal sprays as below and discussed proper administration of these medications after saline rinse.  Instructions reiterated in AVS       Relevant Medications    fluticasone propionate (FLONASE) 50 mcg/actuation nasal spray    azelastine (ASTELIN) 137 mcg (0.1 %) nasal spray     Other Visit Diagnoses       Encounter for Prevnar pneumococcal vaccination        Relevant Medications    pneumoc 20-eber conj-dip cr(PF) (PREVNAR-20 (PF)) injection Syrg 0.5 mL (Completed)      HPI   Brianna Coughlin is a 59 y.o. female with multiple medical diagnoses as listed in the medical history and problem list who presents for acute left-sided  TMJ pain and chronic rhinorrhea with mild sinus pressure    Patient reports 3 days ago chewed down on a hard piece of cazares and noted severe pain overlying the left TMJ with associated pain on chewing.    -- Notes some improvement since onset of symptoms, but has been avoiding hard/chewy food deliberately.   -- Denies any dysphagia with soft food, dental pain.  Does f/u regularly with a dentist.     -- No history of autoimmune disease.   -- Uncertain if she grinds teeth at night, but does note frequent jaw clenching     ROS:  Patient denies any CP, SOB, abdominal pain, fever, chills, dizziness, rash, unintentional weight loss, hematuria, hematochezia    Follow Up: With PCP, Dr. Lozoya, in 7/2025 as scheduled     Health maintenance reviewed   Health Maintenance         Date Due Completion Date    Mammogram 04/01/2025 4/1/2024    Hemoglobin A1c (Diabetic Prevention Screening) 07/10/2027 7/10/2024    Cervical Cancer Screening 08/26/2029 8/26/2024    Lipid Panel 09/09/2029 9/9/2024    TETANUS VACCINE 05/28/2030 5/28/2020    Colorectal Cancer Screening 11/22/2032 11/22/2022    Override on 7/12/2013: Done    RSV Vaccine (Age 60+ and Pregnant patients) (1 - 1-dose 75+ series) 05/06/2040 ---             Physical Exam   Vital signs reviewed.  Afebrile  Body mass index is 31.01 kg/m².   General:  Well-developed, well-nourished. NAD.   Skin:  Warm, dry. No rashes or lesions noted. No palmar erythema. Cap refill <2s bilaterally  Head:  NC/AT   Eyes:  Conjunctivae w/o exudates or hemorrhage.  Non-icteric sclerae.  Ears:  External ears w/o swelling or erythema.  Nose:  Nares are patent bilaterally w/o rhinorrhea or epistaxis  Mouth:  Mucosa is pink and moist.  No nodules or lesions noted.  No tonsillar swelling or exudates.  -- mild TTP at left TMJ without any obvious crepitus with opening and closing of the jaw.  No swelling or sign of infection, no fluctuance or induration.  Neck:  Supple w/o adenopathy or nuchal rigidity.   Trachea is midline.   Heart:  Normal S1 & S2.  No extra heart sounds.  No pulsus alternans.  Lungs:  CTAB without rales, rhonchi or wheezing.  Breathing comfortably on RA.  Abdomen:  Symmetric, non-distended, non-tender  Extremities:  UE & LE are atraumatic without swelling, erythema, tenderness or deformity.  Pulses palpable in all extremities.  Neuro:  A&O4.  No obvious focal deficits. Negative Clark's sign.     Psychiatric:  Appropriate affect.      History     Past Medical History:  Past Medical History:   Diagnosis Date    Abnormal Pap smear     Anxiety     Breast cyst     Celiac disease     Dyspepsia     Dysplasia of cervix     Fibroids     GERD (gastroesophageal reflux disease)     Hypertension     Ovarian cyst        Past Surgical History:  Past Surgical History:   Procedure Laterality Date    CHOLECYSTECTOMY      COLONOSCOPY      COLONOSCOPY N/A 11/22/2022    Procedure: COLONOSCOPY;  Surgeon: Gissell Dominguez MD;  Location: [facility] ([address] [place] [address]);  Service: Gastroenterology;  Laterality: N/A;  Fully vaccinated.EC    conization of cervix      ESOPHAGOGASTRODUODENOSCOPY N/A 08/06/2020    Procedure: EGD (ESOPHAGOGASTRODUODENOSCOPY);  Surgeon: Renard Gallegos MD;  Location: [address]);  Service: Endoscopy;  Laterality: N/A;    TUBAL LIGATION         Social History:  Social History     Socioeconomic History    Marital status: Single   Tobacco Use    Smoking status: Never     Passive exposure: Never    Smokeless tobacco: Never   Substance and Sexual Activity    Alcohol use: Yes     Alcohol/week: 6.0 standard drinks of alcohol     Types: 6 Cans of beer per week     Comment: socially    Drug use: No    Sexual activity: Yes     Partners: Male     Birth control/protection: None     Comment: 17 years     Social Drivers of Health     Financial Resource Strain: Low Risk  (6/3/2024)    Overall Financial Resource Strain (CARDIA)     Difficulty of Paying Living Expenses: Not very hard   Food Insecurity: No Food  Insecurity (6/3/2024)    Hunger Vital Sign     Worried About Running Out of Food in the Last Year: Never true     Ran Out of Food in the Last Year: Never true   Transportation Needs: No Transportation Needs (7/13/2021)    [facility]    PRAPARE - Transportation     Lack of Transportation (Medical): No     Lack of Transportation (Non-Medical): No   Physical Activity: Insufficiently Active (6/3/2024)    Exercise Vital Sign     Days of Exercise per Week: 3 days     Minutes of Exercise per Session: 30 min   Stress: No Stress Concern Present (6/3/2024)    [employer] - Occupational Stress Questionnaire     Feeling of Stress : Only a little   Housing Stability: Unknown (6/3/2024)    Housing Stability Vital Sign     Unable to Pay for Housing in the Last Year: No       Family History:  Family History   Problem Relation Name Age of Onset    Stroke Maternal Grandmother      Heart disease Maternal Grandfather          had pacemaker    Heart disease Father  55    Diabetes Mother      Hypertension Mother      Heart disease Mother  55    Heart disease Brother  35    Diabetes Brother      Colon cancer Cousin  60    Heart attacks under age 50 Maternal Uncle  45    Heart attacks under age 50 Maternal Uncle          had heart transplant    Breast cancer Neg Hx         Allergies and Medications: (updated and reviewed)  Review of patient's allergies indicates:   Allergen Reactions    Acetaminophen-codeine Other (See Comments)     Stomach pains    Iodinated contrast media Anaphylaxis    Mobic [meloxicam] Other (See Comments)     STOMACH ULCERS    Iodine Other (See Comments)    Penicillin Hives    Sulfa (sulfonamide antibiotics) Nausea And Vomiting     Other reaction(s): Rash    Unable to assess      MEDICINES CONTAINING ASPIRIN AFFECT HER STOMACH/STOMACH ULCER    Codeine Rash    Penicillins Nausea And Vomiting and Rash     Current Outpatient Medications   Medication Sig Dispense  Refill    acetaminophen (TYLENOL) 500 MG tablet Take 1-2 tablets (500-1,000 mg total) by mouth 3 (three) times daily as needed for Pain.  0    ALPRAZolam (XANAX) 0.5 MG tablet TAKE 1 TABLET BY MOUTH EVERY DAY AS NEEDED 30 tablet 0    atenoloL (TENORMIN) 100 MG tablet Take 1 tablet (100 mg total) by mouth once daily. 90 tablet 3    atorvastatin (LIPITOR) 10 MG tablet Take 1 tablet (10 mg total) by mouth once daily. 90 tablet 3    diclofenac sodium (VOLTAREN) 1 % Gel Apply 2 g topically 4 (four) times daily as needed (joint pain). 300 g 2    estradiol 0.05 mg/24 hr td ptsw (VIVELLE-DOT) 0.05 mg/24 hr Place 1 patch onto the skin twice a week. 24 patch 3    FLUoxetine 10 MG capsule Take 1 capsule (10 mg total) by mouth once daily. 90 capsule 3    multivitamin (THERAGRAN) per tablet Take 1 tablet by mouth once daily.      omeprazole (PRILOSEC) 40 MG capsule Take 1 capsule (40 mg total) by mouth once daily. 90 capsule 2    progesterone (PROMETRIUM) 100 MG capsule Take 1 capsule (100 mg total) by mouth nightly. 90 capsule 3    azelastine (ASTELIN) 137 mcg (0.1 %) nasal spray 1 spray (137 mcg total) by Nasal route 2 (two) times daily. 30 mL 5    fluticasone propionate (FLONASE) 50 mcg/actuation nasal spray 1 spray (50 mcg total) by Each Nostril route once daily. 15.8 mL 5     No current facility-administered medications for this visit.       Patient Care Team:  Yas Lozoya MD as PCP - General (Family Medicine)  Ivana Flanagan LPN as Licensed Practical Nurse  Harjit Adler MD (Inactive) as Consulting Physician (Gastroenterology)  You Whitfield II, MD as Obstetrician (Obstetrics)        Dave Riddle PA-C  [facility]  [facility]                 - The patient is given an After Visit Summary that lists all medications with directions, allergies, education, orders placed during this encounter and follow-up instructions.      - I have reviewed the patient's medical  information including past medical, family, and social history sections including the medications and allergies.      - We discussed the patient's current medications.     This note was created by combination of typed  and MModal dictation.  Transcription errors may be present.  If there are any questions, please contact me.

## 2024-12-28 PROBLEM — Z23 ENCOUNTER FOR PREVNAR PNEUMOCOCCAL VACCINATION: Status: ACTIVE | Noted: 2024-12-28

## 2025-01-16 ENCOUNTER — PATIENT MESSAGE (OUTPATIENT)
Dept: FAMILY MEDICINE | Facility: CLINIC | Age: 60
End: 2025-01-16
Payer: COMMERCIAL

## 2025-01-17 ENCOUNTER — OFFICE VISIT (OUTPATIENT)
Dept: FAMILY MEDICINE | Facility: CLINIC | Age: 60
End: 2025-01-17
Payer: COMMERCIAL

## 2025-01-17 ENCOUNTER — LAB VISIT (OUTPATIENT)
Dept: LAB | Facility: HOSPITAL | Age: 60
End: 2025-01-17
Payer: COMMERCIAL

## 2025-01-17 VITALS
OXYGEN SATURATION: 97 % | HEIGHT: 64 IN | SYSTOLIC BLOOD PRESSURE: 110 MMHG | WEIGHT: 175.94 LBS | TEMPERATURE: 99 F | HEART RATE: 56 BPM | DIASTOLIC BLOOD PRESSURE: 72 MMHG | BODY MASS INDEX: 30.04 KG/M2

## 2025-01-17 DIAGNOSIS — R59.9 SWOLLEN LYMPH NODES: ICD-10-CM

## 2025-01-17 DIAGNOSIS — M26.642 ARTHRITIS OF LEFT TEMPOROMANDIBULAR JOINT: ICD-10-CM

## 2025-01-17 DIAGNOSIS — R59.9 SWOLLEN LYMPH NODES: Primary | ICD-10-CM

## 2025-01-17 LAB
BASOPHILS # BLD AUTO: 0.06 K/UL (ref 0–0.2)
BASOPHILS NFR BLD: 0.7 % (ref 0–1.9)
DIFFERENTIAL METHOD BLD: NORMAL
EOSINOPHIL # BLD AUTO: 0.2 K/UL (ref 0–0.5)
EOSINOPHIL NFR BLD: 2.6 % (ref 0–8)
ERYTHROCYTE [DISTWIDTH] IN BLOOD BY AUTOMATED COUNT: 12.8 % (ref 11.5–14.5)
HCT VFR BLD AUTO: 42.1 % (ref 37–48.5)
HGB BLD-MCNC: 13.8 G/DL (ref 12–16)
IMM GRANULOCYTES # BLD AUTO: 0.02 K/UL (ref 0–0.04)
IMM GRANULOCYTES NFR BLD AUTO: 0.2 % (ref 0–0.5)
LYMPHOCYTES # BLD AUTO: 2.5 K/UL (ref 1–4.8)
LYMPHOCYTES NFR BLD: 29.4 % (ref 18–48)
MCH RBC QN AUTO: 29.9 PG (ref 27–31)
MCHC RBC AUTO-ENTMCNC: 32.8 G/DL (ref 32–36)
MCV RBC AUTO: 91 FL (ref 82–98)
MONOCYTES # BLD AUTO: 0.9 K/UL (ref 0.3–1)
MONOCYTES NFR BLD: 10.7 % (ref 4–15)
NEUTROPHILS # BLD AUTO: 4.8 K/UL (ref 1.8–7.7)
NEUTROPHILS NFR BLD: 56.4 % (ref 38–73)
NRBC BLD-RTO: 0 /100 WBC
PLATELET # BLD AUTO: 358 K/UL (ref 150–450)
PMV BLD AUTO: 10.7 FL (ref 9.2–12.9)
RBC # BLD AUTO: 4.62 M/UL (ref 4–5.4)
WBC # BLD AUTO: 8.48 K/UL (ref 3.9–12.7)

## 2025-01-17 PROCEDURE — 1160F RVW MEDS BY RX/DR IN RCRD: CPT | Mod: CPTII,S$GLB,,

## 2025-01-17 PROCEDURE — 3074F SYST BP LT 130 MM HG: CPT | Mod: CPTII,S$GLB,,

## 2025-01-17 PROCEDURE — 3008F BODY MASS INDEX DOCD: CPT | Mod: CPTII,S$GLB,,

## 2025-01-17 PROCEDURE — 1159F MED LIST DOCD IN RCRD: CPT | Mod: CPTII,S$GLB,,

## 2025-01-17 PROCEDURE — 36415 COLL VENOUS BLD VENIPUNCTURE: CPT | Mod: PO

## 2025-01-17 PROCEDURE — 99214 OFFICE O/P EST MOD 30 MIN: CPT | Mod: S$GLB,,,

## 2025-01-17 PROCEDURE — 85025 COMPLETE CBC W/AUTO DIFF WBC: CPT

## 2025-01-17 PROCEDURE — 99999 PR PBB SHADOW E&M-EST. PATIENT-LVL IV: CPT | Mod: PBBFAC,,,

## 2025-01-17 PROCEDURE — 3078F DIAST BP <80 MM HG: CPT | Mod: CPTII,S$GLB,,

## 2025-01-17 NOTE — ASSESSMENT & PLAN NOTE
Reports significant swelling to the right anterior neck for the past 2 weeks.    This has slowly improved without intervention  No associated pain, fever, difficulty swallowing, rashes, headaches, fatigue.  Denies any new diarrhea apart from that associated with her known Celiac dz.  Exam:  There is an approximately 3cm area of soft swelling to the right anterior neck which appears to be a cervical lymph node   Oropharynx is clear  No signs of dental or periodontal infection.  Very very low suspicion for Lemierre's.   Plan:  Warm compresses to promote drainage  Will order CBC just to be safe and rule out an infection  If you develop a fever, difficulty breathing, trouble swallowing or any new or severe symptoms, please visit the ED

## 2025-01-17 NOTE — ASSESSMENT & PLAN NOTE
Last OV 12/27/2024:  - Reports 3 days ago chewed down on a hard piece of aczares and noted severe pain overlying the left TMJ with associated pain on chewing.    -- Notes some improvement since onset of symptoms, but has been avoiding hard/chewy food deliberately.   -- Denies any dysphagia with soft food, dental pain.  Does f/u regularly with a dentist.     -- No history of autoimmune disease.   -- Uncertain if she grinds teeth at night, but does note frequent jaw clenching   Chart review - no imaging of head or TMJ available  Exam: Mild TTP overlying the left TMJ  PLAN  -- TMJ exercises provided in AVS  -- Recommended mouthguard at night in case teeth grinding is contributory  -- She has Voltaren gel prescribed for knee arthritis - advised her to use this as needed on TMJ, and not to use it concurrently with more than 600 mg ibuprofen.   -- Advised patient to let me know or visit the ED if symptoms don't improve or worsen over the next week, or if she is unable to tolerate food or liquid intake, or if she notices any fevers or neck stiffness.   Today:  -- Notes significant improvement with TMJ exercises and Tylenol PRN  -- Advised to continue as above. Commended for completing TMJ exercsies as discussed last OV

## 2025-01-17 NOTE — PATIENT INSTRUCTIONS
Right Anterior Lymph Node Swelling  Plan:  -- Warm compresses to promote drainage  -- Stay well-hydrated  -- Will order CBC just to be safe and rule out an infection  -- Follow-up regularly with dentistry as scheduled - sounds like you're due for your next cleaning, so be sure to schedule this soon.  -- If you develop a fever, difficulty breathing, trouble swallowing or any new or severe symptoms, please visit the ED

## 2025-01-17 NOTE — PROGRESS NOTES
Assessment & Plan     Swollen lymph nodes  Reports significant swelling to the right anterior neck for the past 2 weeks.    This has slowly improved without intervention  No associated pain, fever, difficulty swallowing, rashes, headaches, fatigue.  Denies any new diarrhea apart from that associated with her known Celiac dz.  Exam:  -- Afebrile.  -- There is an approximately 3cm area of soft-tissue swelling to the right anterior neck which might be a cervical lymph node.  No tenderness, no induration or fluctuance appreciated.  No overlying erythema or ecchymosis.  -- Oropharynx is clear.  No signs of dental or periodontal infection.  Very very low suspicion for Lemierre's.   Plan:  Warm compresses to promote drainage  Will order CBC just to be safe and rule out an infection  If you develop a fever, difficulty breathing, trouble swallowing or any new or severe symptoms, please visit the ED  Orders:  -     CBC W/ AUTO DIFFERENTIAL; Future; Expected date: 01/17/2025      Arthritis of left temporomandibular joint  Last OV 12/27/2024:  - Reports 3 days ago chewed down on a hard piece of cazares and noted severe pain overlying the left TMJ with associated pain on chewing.    -- Notes some improvement since onset of symptoms, but has been avoiding hard/chewy food deliberately.   -- Denies any dysphagia with soft food, dental pain.  Does f/u regularly with a dentist.     -- No history of autoimmune disease.   -- Uncertain if she grinds teeth at night, but does note frequent jaw clenching   Chart review - no imaging of head or TMJ available  Exam: Mild TTP overlying the left TMJ  PLAN  -- TMJ exercises provided in AVS  -- Recommended mouthguard at night in case teeth grinding is contributory  -- She has Voltaren gel prescribed for knee arthritis - advised her to use this as needed on TMJ, and not to use it concurrently with more than 600 mg ibuprofen.   -- Advised patient to let me know or visit the ED if symptoms don't  improve or worsen over the next week, or if she is unable to tolerate food or liquid intake, or if she notices any fevers or neck stiffness.   Today:  -- Notes significant improvement with TMJ exercises and Tylenol PRN  -- Advised to continue as above. Commended for completing TMJ exercsies as discussed last OV         HPI   Brianna Coughlin is a 59 y.o. female with multiple medical diagnoses as listed in the medical history and problem list who presents for a swollen lymph node to her right anterior neck, as well as f/u regarding TMJ pain noted last OV    Anterior Neck Swelling:  Patient reports significant swelling to the right anterior neck for the past 2 weeks.  This has slowly improved without intervention, but is still present    TMJ pain:  Notes significant improvement with TMJ exercises and Tylenol PRN    ROS:  Denies any associated pain, fever, difficulty swallowing, rashes, headaches, fatigue.  Denies any new diarrhea apart from that associated with her known Celiac dz.    Follow Up: With PCP Dr. Lozoya in 6 months for annual exam     Health maintenance reviewed   Health Maintenance         Date Due Completion Date    Mammogram 04/01/2025 4/1/2024    Hemoglobin A1c (Diabetic Prevention Screening) 07/10/2027 7/10/2024    Cervical Cancer Screening 08/26/2029 8/26/2024    Lipid Panel 09/09/2029 9/9/2024    TETANUS VACCINE 05/28/2030 5/28/2020    Colorectal Cancer Screening 11/22/2032 11/22/2022    Override on 7/12/2013: Done    RSV Vaccine (Age 60+ and Pregnant patients) (1 - 1-dose 75+ series) 05/06/2040 ---                 Physical Exam   Vital signs reviewed. Afebrile  Body mass index is 30.2 kg/m².   General:  Well-developed, well-nourished. NAD.   Skin:  Warm, dry. No rashes or lesions noted. No palmar erythema. Cap refill <2s bilaterally  Head:  NC/AT   Eyes:  Conjunctivae w/o exudates or hemorrhage.  Non-icteric sclerae.  Ears:  External ears w/o swelling or erythema.  Nose:  Nares are patent  bilaterally w/o rhinorrhea or epistaxis  Mouth:  Mucosa is pink and moist.  No nodules or lesions noted.  No tonsillar swelling or exudates.  Neck:  Supple w/o nuchal rigidity.  Trachea is midline.   -- There is an approximately 3cm area of soft-tissue swelling to the right anterior neck which might be a cervical lymph node.  No tenderness, no induration or fluctuance appreciated.  No overlying erythema or ecchymosis.  -- Oropharynx is clear.  No signs of dental or periodontal infection.  Very very low suspicion for Lemierre's.   Lungs:  CTAB without rales, rhonchi or wheezing.  Breathing comfortably on RA.  Heart:  Normal S1 & S2.  No extra heart sounds.  No pulsus alternans.  Extremities:  Radial pulses 2+ and symmetric.  Neuro:  A&O4.  No obvious focal deficits. Negative Clark's sign.    Psychiatric:  Appropriate affect.          History     Past Medical History:  Past Medical History:   Diagnosis Date    Abnormal Pap smear     Anxiety     Breast cyst     Celiac disease     Dyspepsia     Dysplasia of cervix     Fibroids     GERD (gastroesophageal reflux disease)     Hypertension     Ovarian cyst        Past Surgical History:  Past Surgical History:   Procedure Laterality Date    CHOLECYSTECTOMY      COLONOSCOPY      COLONOSCOPY N/A 11/22/2022    Procedure: COLONOSCOPY;  Surgeon: Gissell Dominguez MD;  Location: [address]);  Service: Gastroenterology;  Laterality: N/A;  Fully vaccinated.EC    conization of cervix      ESOPHAGOGASTRODUODENOSCOPY N/A 08/06/2020    Procedure: EGD (ESOPHAGOGASTRODUODENOSCOPY);  Surgeon: Renard Gallegos MD;  Location: [address]);  Service: Endoscopy;  Laterality: N/A;    TUBAL LIGATION         Social History:  Social History     Socioeconomic History    Marital status: Single   Tobacco Use    Smoking status: Never     Passive exposure: Never    Smokeless tobacco: Never   Substance and Sexual Activity    Alcohol use: Yes     Alcohol/week: 6.0 standard drinks of alcohol      Types: 6 Cans of beer per week     Comment: socially    Drug use: No    Sexual activity: Yes     Partners: Male     Birth control/protection: None     Comment: 17 years     Social Drivers of Health     Financial Resource Strain: Low Risk  (6/3/2024)    Overall Financial Resource Strain (CARDIA)     Difficulty of Paying Living Expenses: Not very hard   Food Insecurity: No Food Insecurity (6/3/2024)    Hunger Vital Sign     Worried About Running Out of Food in the Last Year: Never true     Ran Out of Food in the Last Year: Never true   Transportation Needs: No Transportation Needs (7/13/2021)    [facility]    PRAPARE - Transportation     Lack of Transportation (Medical): No     Lack of Transportation (Non-Medical): No   Physical Activity: Insufficiently Active (6/3/2024)    Exercise Vital Sign     Days of Exercise per Week: 3 days     Minutes of Exercise per Session: 30 min   Stress: No Stress Concern Present (6/3/2024)    [employer] [place] [employer] - Occupational Stress Questionnaire     Feeling of Stress : Only a little   Housing Stability: Unknown (6/3/2024)    Housing Stability Vital Sign     Unable to Pay for Housing in the Last Year: No       Family History:  Family History   Problem Relation Name Age of Onset    Stroke Maternal Grandmother      Heart disease Maternal Grandfather          had pacemaker    Heart disease Father  55    Diabetes Mother      Hypertension Mother      Heart disease Mother  55    Heart disease Brother  35    Diabetes Brother      Colon cancer Cousin  60    Heart attacks under age 50 Maternal Uncle  45    Heart attacks under age 50 Maternal Uncle          had heart transplant    Breast cancer Neg Hx         Allergies and Medications: (updated and reviewed)  Review of patient's allergies indicates:   Allergen Reactions    Acetaminophen-codeine Other (See Comments)     Stomach pains    Iodinated contrast media Anaphylaxis    Mobic [meloxicam] Other  (See Comments)     STOMACH ULCERS    Iodine Other (See Comments)    Penicillin Hives    Sulfa (sulfonamide antibiotics) Nausea And Vomiting     Other reaction(s): Rash    Unable to assess      MEDICINES CONTAINING ASPIRIN AFFECT HER STOMACH/STOMACH ULCER    Codeine Rash    Penicillins Nausea And Vomiting and Rash     Current Outpatient Medications   Medication Sig Dispense Refill    acetaminophen (TYLENOL) 500 MG tablet Take 1-2 tablets (500-1,000 mg total) by mouth 3 (three) times daily as needed for Pain.  0    ALPRAZolam (XANAX) 0.5 MG tablet TAKE 1 TABLET BY MOUTH EVERY DAY AS NEEDED 30 tablet 0    atenoloL (TENORMIN) 100 MG tablet Take 1 tablet (100 mg total) by mouth once daily. 90 tablet 3    atorvastatin (LIPITOR) 10 MG tablet Take 1 tablet (10 mg total) by mouth once daily. 90 tablet 3    azelastine (ASTELIN) 137 mcg (0.1 %) nasal spray 1 spray (137 mcg total) by Nasal route 2 (two) times daily. 30 mL 5    diclofenac sodium (VOLTAREN) 1 % Gel Apply 2 g topically 4 (four) times daily as needed (joint pain). 300 g 2    estradiol 0.05 mg/24 hr td ptsw (VIVELLE-DOT) 0.05 mg/24 hr Place 1 patch onto the skin twice a week. 24 patch 3    FLUoxetine 10 MG capsule Take 1 capsule (10 mg total) by mouth once daily. 90 capsule 3    fluticasone propionate (FLONASE) 50 mcg/actuation nasal spray 1 spray (50 mcg total) by Each Nostril route once daily. 15.8 mL 5    multivitamin (THERAGRAN) per tablet Take 1 tablet by mouth once daily.      omeprazole (PRILOSEC) 40 MG capsule Take 1 capsule (40 mg total) by mouth once daily. 90 capsule 2    progesterone (PROMETRIUM) 100 MG capsule Take 1 capsule (100 mg total) by mouth nightly. 90 capsule 3     No current facility-administered medications for this visit.       Patient Care Team:  Yas Lozoya MD as PCP - General (Family Medicine)  Ivana Flanagan LPN as Licensed Practical Nurse  Harjit Adler MD (Inactive) as Consulting Physician  (Gastroenterology)  You Whitfield II, MD as Obstetrician (Obstetrics)        Dave Riddle PA-C  Family Medicine [facility]             - The patient is given an After Visit Summary that lists all medications with directions, allergies, education, orders placed during this encounter and follow-up instructions.      - I have reviewed the patient's medical information including past medical, family, and social history sections including the medications and allergies.      - We discussed the patient's current medications.     This note was created by combination of typed  and MModal dictation.  Transcription errors may be present.  If there are any questions, please contact me.

## 2025-03-18 ENCOUNTER — HOSPITAL ENCOUNTER (OUTPATIENT)
Dept: RADIOLOGY | Facility: HOSPITAL | Age: 60
Discharge: HOME | End: 2025-03-18
Attending: ORTHOPAEDIC SURGERY
Payer: COMMERCIAL

## 2025-03-18 ENCOUNTER — OFFICE VISIT (OUTPATIENT)
Dept: ORTHOPEDICS | Facility: CLINIC | Age: 60
End: 2025-03-18
Payer: COMMERCIAL

## 2025-03-18 VITALS — HEIGHT: 64 IN | BODY MASS INDEX: 30.04 KG/M2 | WEIGHT: 175.94 LBS

## 2025-03-18 DIAGNOSIS — M19.072 ARTHRITIS OF LEFT MIDFOOT: Primary | ICD-10-CM

## 2025-03-18 DIAGNOSIS — R52 PAIN: ICD-10-CM

## 2025-03-18 PROCEDURE — 73630 X-RAY EXAM OF FOOT: CPT | Mod: TC,LT

## 2025-03-18 PROCEDURE — 73630 X-RAY EXAM OF FOOT: CPT | Mod: 26,LT,, | Performed by: RADIOLOGY

## 2025-03-18 PROCEDURE — 99999 PR PBB SHADOW E&M-EST. PATIENT-LVL III: CPT | Mod: PBBFAC,,, | Performed by: ORTHOPAEDIC SURGERY

## 2025-03-18 RX ORDER — NAPROXEN 500 MG/1
500 TABLET ORAL EVERY 12 HOURS PRN
Qty: 60 TABLET | Refills: 0 | Status: SHIPPED | OUTPATIENT
Start: 2025-03-18 | End: 2025-04-17

## 2025-03-18 NOTE — PROGRESS NOTES
Subjective:      Patient ID: Brianna Coughlin is a 59 y.o. female.    Chief Complaint: Pain of the Left Foot      HPI:   History of Present Illness    HPI:  Patient presents with left foot pain that has worsened over the past three weeks. The pain is localized to the top, inside, and sometimes underneath the left foot. She describes it as a persistent ache, similar to a toothache. Patient denies any numbing, tingling, or electrical sensations. Her toes feel as if they are , and she has developed a hammer toe.    The pain is currently at its worst, exacerbated when she is on her feet for prolonged periods and improving when she is off her feet, particularly at night. Prior to the past three weeks, it was intermittent and minor. She reports her heel feels as if it's  from the shoe when wearing certain footwear, particularly flip flops or sandals. She denies any recent changes in footwear or activities that might have triggered the pain, and has not noticed any swelling in her feet.    Patient previously received an injection for foot pain, which was effective for the pain in the ball of her foot. Since then, she has been consistently wearing shoes with arch support as advised. If she does not wear arch support, her feet hurt even more than they do currently.    She denies taking any anti-inflammatory medications.    PREVIOUS TREATMENTS:  - had previous neuroma of the left 2nd space which resolved after injection 10 years ago  - Patient has been consistently wearing arch support shoes since receiving the injection, as recommended by the doctor.    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  - Job requires being on feet for prolonged periods, causing foot pain  - Feet hurt more during the work week compared to weekends  - Pain has been significantly worse over the past three weeks, affecting ability to stand and  "walk comfortably at work      ROS:  Musculoskeletal: +limb pain, +bone deformity, +pain with movement, +lower extremity pain with movement, +body aches  Neurological: -numbness          Past Medical History:   Diagnosis Date    Abnormal Pap smear     Anxiety     Breast cyst     Celiac disease     Dyspepsia     Dysplasia of cervix     Fibroids     GERD (gastroesophageal reflux disease)     Hypertension     Ovarian cyst      Current Medications[1]  Review of patient's allergies indicates:   Allergen Reactions    Acetaminophen-codeine Other (See Comments)     Stomach pains    Iodinated contrast media Anaphylaxis    Mobic [meloxicam] Other (See Comments)     STOMACH ULCERS    Iodine Other (See Comments)    Penicillin Hives    Sulfa (sulfonamide antibiotics) Nausea And Vomiting     Other reaction(s): Rash    Unable to assess      MEDICINES CONTAINING ASPIRIN AFFECT HER STOMACH/STOMACH ULCER    Codeine Rash    Penicillins Nausea And Vomiting and Rash       Ht 5' 4" (1.626 m)   Wt 79.8 kg (175 lb 14.8 oz)   LMP 02/24/2020 (Approximate)   BMI 30.20 kg/m²     Objective:    Ortho Exam   Physical Exam    Is a well-developed well-nourished female who walks in with a normal gait.  On standing inspection she has mild varus alignment of her heels and mild adduction of her feet.  There is a slight noticeable prominence on the dorsum of the left midfoot.  On sitting exam she has full range of motion of her left ankle and subtalar joint.  She has normal function and strength of all the tendons about her ankle.  She has a good dorsalis pedis pulse.  There is a cystic type lesion in the midfoot which is nontender.  She has some mild pain with stressing of the midfoot.  She is neurovascularly intact.    Imaging: I ordered and reviewed standing x-ray of the left foot.  The standing lateral view reveals a well-maintained arch with some midfoot degenerative changes.         Assessment:       1. Arthritis of left midfoot          Plan: "       Orders Placed This Encounter    X-Ray Foot Complete Left    naproxen (NAPROSYN) 500 MG tablet     Assessment & Plan    - Reviewed XRs from current visit and 10 years ago, noting increased midfoot arthritis.  - Use shoes with arch support.  - Use Total Support Max insoles, available online or on [employer], sized to fit shoe.  - Started naproxen (Aleve), to be taken before work and in the evening if needed.  - Follow up in 2-3 months if no improvement.        No follow-ups on file.          This note was generated with the assistance of ambient listening technology. Verbal consent was obtained by the patient and accompanying visitor(s) for the recording of patient appointment to facilitate this note. I attest to having reviewed and edited the generated note for accuracy, though some syntax or spelling errors may persist. Please contact the author of this note for any clarification.               [1]   Current Outpatient Medications:     acetaminophen (TYLENOL) 500 MG tablet, Take 1-2 tablets (500-1,000 mg total) by mouth 3 (three) times daily as needed for Pain., Disp: , Rfl: 0    ALPRAZolam (XANAX) 0.5 MG tablet, TAKE 1 TABLET BY MOUTH EVERY DAY AS NEEDED, Disp: 30 tablet, Rfl: 0    atenoloL (TENORMIN) 100 MG tablet, Take 1 tablet (100 mg total) by mouth once daily., Disp: 90 tablet, Rfl: 3    atorvastatin (LIPITOR) 10 MG tablet, Take 1 tablet (10 mg total) by mouth once daily., Disp: 90 tablet, Rfl: 3    azelastine (ASTELIN) 137 mcg (0.1 %) nasal spray, 1 spray (137 mcg total) by Nasal route 2 (two) times daily., Disp: 30 mL, Rfl: 5    diclofenac sodium (VOLTAREN) 1 % Gel, Apply 2 g topically 4 (four) times daily as needed (joint pain)., Disp: 300 g, Rfl: 2    estradiol 0.05 mg/24 hr td ptsw (VIVELLE-DOT) 0.05 mg/24 hr, Place 1 patch onto the skin twice a week., Disp: 24 patch, Rfl: 3    FLUoxetine 10 MG capsule, Take 1 capsule (10 mg total) by mouth once daily., Disp: 90 capsule, Rfl: 3    fluticasone propionate  (FLONASE) 50 mcg/actuation nasal spray, 1 spray (50 mcg total) by Each Nostril route once daily., Disp: 15.8 mL, Rfl: 5    multivitamin (THERAGRAN) per tablet, Take 1 tablet by mouth once daily., Disp: , Rfl:     omeprazole (PRILOSEC) 40 MG capsule, Take 1 capsule (40 mg total) by mouth once daily., Disp: 90 capsule, Rfl: 2    progesterone (PROMETRIUM) 100 MG capsule, Take 1 capsule (100 mg total) by mouth nightly., Disp: 90 capsule, Rfl: 3    naproxen (NAPROSYN) 500 MG tablet, Take 1 tablet (500 mg total) by mouth every 12 (twelve) hours as needed (Foot pain)., Disp: 60 tablet, Rfl: 0

## 2025-04-09 ENCOUNTER — HOSPITAL ENCOUNTER (OUTPATIENT)
Dept: RADIOLOGY | Facility: HOSPITAL | Age: 60
Discharge: HOME | End: 2025-04-09
Attending: FAMILY MEDICINE
Payer: COMMERCIAL

## 2025-04-09 DIAGNOSIS — Z12.31 ENCOUNTER FOR SCREENING MAMMOGRAM FOR BREAST CANCER: ICD-10-CM

## 2025-04-09 PROCEDURE — 77067 SCR MAMMO BI INCL CAD: CPT | Mod: 26,,, | Performed by: RADIOLOGY

## 2025-04-09 PROCEDURE — 77063 BREAST TOMOSYNTHESIS BI: CPT | Mod: 26,,, | Performed by: RADIOLOGY

## 2025-04-09 PROCEDURE — 77063 BREAST TOMOSYNTHESIS BI: CPT | Mod: TC,PO

## 2025-04-10 ENCOUNTER — RESULTS FOLLOW-UP (OUTPATIENT)
Dept: FAMILY MEDICINE | Facility: CLINIC | Age: 60
End: 2025-04-10

## 2025-05-05 DIAGNOSIS — Z78.0 MENOPAUSE: ICD-10-CM

## 2025-05-05 DIAGNOSIS — E78.5 HYPERLIPIDEMIA, UNSPECIFIED HYPERLIPIDEMIA TYPE: ICD-10-CM

## 2025-05-05 NOTE — TELEPHONE ENCOUNTER
Care Due:                  Date            Visit Type   Department     Provider  --------------------------------------------------------------------------------                                 -         [facility]                              PRIMARY      / INTERNAL MED  Last Visit: 07-      CARE ([facility])   / [facility]         Yas Mcclendon                               -         [facility]                              PRIMARY      / INTERNAL MED  Next Visit: 07-      CARE ([facility])   / PEDS         Yas Mcclendon                                                            Last  Test          Frequency    Reason                     Performed    Due Date  --------------------------------------------------------------------------------    CMP.........  12 months..  atorvastatin.............  07-   07-    [facility] Embedded Care Due Messages. Reference number: [id number].   5/05/2025 3:10:54 PM CDT

## 2025-05-06 ENCOUNTER — PATIENT MESSAGE (OUTPATIENT)
Dept: FAMILY MEDICINE | Facility: CLINIC | Age: 60
End: 2025-05-06
Payer: COMMERCIAL

## 2025-05-06 RX ORDER — ATORVASTATIN CALCIUM 10 MG/1
10 TABLET, FILM COATED ORAL DAILY
Qty: 90 TABLET | Refills: 3 | Status: SHIPPED | OUTPATIENT
Start: 2025-05-06

## 2025-05-06 RX ORDER — FLUOXETINE 10 MG/1
10 CAPSULE ORAL DAILY
Qty: 90 CAPSULE | Refills: 3 | Status: SHIPPED | OUTPATIENT
Start: 2025-05-06 | End: 2026-05-06

## 2025-05-07 DIAGNOSIS — I10 ESSENTIAL HYPERTENSION: ICD-10-CM

## 2025-05-07 NOTE — TELEPHONE ENCOUNTER
Unable to retrieve patient chart and identify care due.  [facility] Embedded Care Due Messages. Reference number: [id number].   5/07/2025 3:12:00 PM CDT

## 2025-05-08 RX ORDER — ATENOLOL 100 MG/1
100 TABLET ORAL DAILY
Qty: 90 TABLET | Refills: 0 | Status: SHIPPED | OUTPATIENT
Start: 2025-05-08

## 2025-05-08 NOTE — TELEPHONE ENCOUNTER
Refill Decision Note   Brianna Ced  is requesting a refill authorization.  Brief Assessment and Rationale for Refill:  Approve     Medication Therapy Plan:         Comments:     Note composed:10:21 AM 05/08/2025

## 2025-06-04 NOTE — PROGRESS NOTES
General Cardiology Clinic Note  Reason for Visit: Follow up   Last Clinic Visit: 6/3/2024  General Cardiologist: Dr. North     HPI:   Brianna Coughlin is a 59 y.o. female who presents for follow up.     Problems:  Hypertension  Hyperlipidemia  FH of heart disease    HPI  Since her last visit, she underwent coronary calcium score, which was 0. She also had hsCRP and LPA which were both normal. She states she feels well. She was having some palpitations prior to HRT, which have actually improved. Denies symptoms of CAD, CHF, sustained arrhythmia, hypotension, TIA. 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 She checks BP at home sometimes and it is usually normal.       ROS:      Review of Systems   Constitutional: Negative for diaphoresis, malaise/fatigue, weight gain and weight loss.   HENT:  Negative for nosebleeds.    Eyes:  Negative for vision loss in left eye, vision loss in right eye and visual disturbance.   Cardiovascular:  Negative for chest pain, claudication, dyspnea on exertion, irregular heartbeat, leg swelling, near-syncope, orthopnea, palpitations, paroxysmal nocturnal dyspnea and syncope.   Respiratory:  Negative for cough, shortness of breath, sleep disturbances due to breathing, snoring and wheezing.    Hematologic/Lymphatic: Negative for bleeding problem. Does not bruise/bleed easily.   Skin:  Negative for poor wound healing and rash.   Musculoskeletal:  Negative for muscle cramps and myalgias.   Gastrointestinal:  Negative for bloating, abdominal pain, diarrhea, heartburn, melena, nausea and vomiting.   Genitourinary:  Negative for hematuria and nocturia.   Neurological:  Negative for brief paralysis, dizziness, headaches, light-headedness, numbness and weakness.   Psychiatric/Behavioral:  Negative for depression.    Allergic/Immunologic: Negative for hives.       PMH:     Past Medical History:   Diagnosis Date    Abnormal Pap smear     Anxiety     Breast  normal gait and station , no tenderness or deformities present cyst     Celiac disease     Dyspepsia     Dysplasia of cervix     Fibroids     GERD (gastroesophageal reflux disease)     Hypertension     Ovarian cyst      Past Surgical History:   Procedure Laterality Date    CHOLECYSTECTOMY      COLONOSCOPY      COLONOSCOPY N/A 11/22/2022    Procedure: COLONOSCOPY;  Surgeon: Gissell Dominguez MD;  Location: [facility] ([address] [place] [address]);  Service: Gastroenterology;  Laterality: N/A;  Fully vaccinated.EC    conization of cervix      ESOPHAGOGASTRODUODENOSCOPY N/A 08/06/2020    Procedure: EGD (ESOPHAGOGASTRODUODENOSCOPY);  Surgeon: Renard Gallegos MD;  Location: [facility] ([address] [place] [address]);  Service: Endoscopy;  Laterality: N/A;    TUBAL LIGATION       Allergies:     Review of patient's allergies indicates:   Allergen Reactions    Acetaminophen-codeine Other (See Comments)     Stomach pains    Iodinated contrast media Anaphylaxis    Mobic [meloxicam] Other (See Comments)     STOMACH ULCERS    Iodine Other (See Comments)    Penicillin Hives    Sulfa (sulfonamide antibiotics) Nausea And Vomiting     Other reaction(s): Rash    Unable to assess      MEDICINES CONTAINING ASPIRIN AFFECT HER STOMACH/STOMACH ULCER    Codeine Rash    Penicillins Nausea And Vomiting and Rash     Medications:     Current Outpatient Medications on File Prior to Visit   Medication Sig Dispense Refill    acetaminophen (TYLENOL) 500 MG tablet Take 1-2 tablets (500-1,000 mg total) by mouth 3 (three) times daily as needed for Pain.  0    ALPRAZolam (XANAX) 0.5 MG tablet TAKE 1 TABLET BY MOUTH EVERY DAY AS NEEDED 30 tablet 0    atenoloL (TENORMIN) 100 MG tablet Take 1 tablet (100 mg total) by mouth once daily. 90 tablet 3    atorvastatin (LIPITOR) 10 MG tablet Take 1 tablet (10 mg total) by mouth once daily. 90 tablet 3    diclofenac sodium (VOLTAREN) 1 % Gel Apply 2 g topically 4 (four) times daily as needed (joint pain). 300 g 2    estradiol 0.05 mg/24 hr td ptsw (VIVELLE-DOT) 0.05 mg/24 hr Place 1 patch onto the skin twice a week. 24  "patch 3    FLUoxetine 10 MG capsule Take 1 capsule (10 mg total) by mouth once daily. 90 capsule 3    multivitamin (THERAGRAN) per tablet Take 1 tablet by mouth once daily.      omeprazole (PRILOSEC) 40 MG capsule Take 1 capsule (40 mg total) by mouth once daily. 90 capsule 2    progesterone (PROMETRIUM) 100 MG capsule Take 1 capsule (100 mg total) by mouth nightly. 90 capsule 3     No current facility-administered medications on file prior to visit.     Social History:     Social History     Tobacco Use    Smoking status: Never    Smokeless tobacco: Never   Substance Use Topics    Alcohol use: Yes     Alcohol/week: 6.0 standard drinks of alcohol     Types: 6 Cans of beer per week     Comment: socially     Family History:     Family History   Problem Relation Name Age of Onset    Stroke Maternal Grandmother      Heart disease Maternal Grandfather          had pacemaker    Heart disease Father  55    Diabetes Mother      Hypertension Mother      Heart disease Mother  55    Heart disease Brother  35    Diabetes Brother      Colon cancer Cousin  60    Heart attacks under age 50 Maternal Uncle  45    Heart attacks under age 50 Maternal Uncle          had heart transplant    Breast cancer Neg Hx       Physical Exam:   /70   Pulse 65   Ht 5' 4" (1.626 m)   Wt 79.2 kg (174 lb 9.7 oz)   LMP 02/24/2020 (Approximate)   SpO2 99%   BMI 29.97 kg/m²        Physical Exam  Vitals and nursing note reviewed.   Constitutional:       General: She is not in acute distress.     Appearance: Normal appearance. She is not ill-appearing.   HENT:      Head: Normocephalic and atraumatic.   Eyes:      General: No scleral icterus.     Conjunctiva/sclera: Conjunctivae normal.   Neck:      Thyroid: No thyromegaly.      Vascular: No carotid bruit or JVD.   Cardiovascular:      Rate and Rhythm: Normal rate and regular rhythm.      Pulses: Normal pulses.      Heart sounds: Normal heart sounds. No murmur heard.     No friction rub. No " "gallop.   Pulmonary:      Effort: Pulmonary effort is normal.      Breath sounds: Normal breath sounds. No wheezing, rhonchi or rales.   Chest:      Chest wall: No tenderness.   Abdominal:      General: Bowel sounds are normal. There is no distension.      Palpations: Abdomen is soft.      Tenderness: There is no abdominal tenderness.   Musculoskeletal:         General: No swelling.      Cervical back: Neck supple.      Right lower leg: No edema.      Left lower leg: No edema.   Skin:     General: Skin is warm and dry.      Coloration: Skin is not pale.      Findings: No erythema or rash.      Nails: There is no clubbing.   Neurological:      Mental Status: She is alert and oriented to person, place, and time. Mental status is at baseline.   Psychiatric:         Mood and Affect: Mood and affect normal.         Behavior: Behavior normal.          Labs:     Lab Results   Component Value Date     07/10/2024    K 4.4 07/10/2024     07/10/2024    CO2 29 07/10/2024    BUN 13 07/10/2024    CREATININE 0.9 07/10/2024    ANIONGAP 6 (L) 07/10/2024     Lab Results   Component Value Date    HGBA1C 5.3 07/10/2024     No results found for: "BNP", "BNPTRIAGEBLO" Lab Results   Component Value Date    WBC 6.49 07/10/2024    HGB 14.0 07/10/2024    HCT 44.5 07/10/2024     07/10/2024    GRAN 4.0 07/10/2024    GRAN 62.0 07/10/2024     Lab Results   Component Value Date    CHOL 204 (H) 09/09/2024    HDL 71 09/09/2024    LDLCALC 117.4 09/09/2024    TRIG 78 09/09/2024          Imaging:   Echocardiograms:   None    Stress Tests:   Exercise stress echo 3/2/2016   1 - Normal left ventricular systolic function (EF 55-60%).     2 - Trivial mitral regurgitation.     3 - Trivial tricuspid regurgitation.     No evidence of stress induced myocardial ischemia.     Caths:   None    Other:  CT coronary calcium score 6/10/24  Agatston calcium score equals 0, which translates to the 0-25th percentile for coronary calcium in this " 59-year-old female.     Recommended clinical action: Reassure patient while discussing public health guidelines for primary cardiovascular disease prevention.    Assessment:     1. Familial hypercholesterolemia    2. Essential hypertension    3. Menopausal syndrome on hormone replacement therapy      Plan:     Hyperlipidemia   but CACS is 0, so would not increase the dose. Continue Lipitor 10 mg and work on dietary modification.     Hypertension  Well controlled on Atenolol 100 mg daily     Menopausal syndrome on HRT  CACS 0. hsCRP and LPA were both normal. She is low risk for cardiac disease.     Follow up in one year.     Signed:  Juany Fernandez PA-C  Cardiology   [phone] - General

## 2025-07-11 ENCOUNTER — OFFICE VISIT (OUTPATIENT)
Dept: FAMILY MEDICINE | Facility: CLINIC | Age: 60
End: 2025-07-11
Payer: COMMERCIAL

## 2025-07-11 ENCOUNTER — LAB VISIT (OUTPATIENT)
Dept: LAB | Facility: HOSPITAL | Age: 60
End: 2025-07-11
Attending: FAMILY MEDICINE
Payer: COMMERCIAL

## 2025-07-11 VITALS
BODY MASS INDEX: 29.84 KG/M2 | DIASTOLIC BLOOD PRESSURE: 82 MMHG | OXYGEN SATURATION: 99 % | HEART RATE: 60 BPM | HEIGHT: 64 IN | WEIGHT: 174.81 LBS | TEMPERATURE: 98 F | SYSTOLIC BLOOD PRESSURE: 134 MMHG

## 2025-07-11 DIAGNOSIS — Z00.00 ANNUAL PHYSICAL EXAM: ICD-10-CM

## 2025-07-11 DIAGNOSIS — Z00.00 ANNUAL PHYSICAL EXAM: Primary | ICD-10-CM

## 2025-07-11 LAB
ABSOLUTE EOSINOPHIL (OHS): 0.22 K/UL
ABSOLUTE MONOCYTE (OHS): 0.68 K/UL (ref 0.3–1)
ABSOLUTE NEUTROPHIL COUNT (OHS): 3.26 K/UL (ref 1.8–7.7)
ALBUMIN SERPL BCP-MCNC: 4.1 G/DL (ref 3.5–5.2)
ALP SERPL-CCNC: 43 UNIT/L (ref 40–150)
ALT SERPL W/O P-5'-P-CCNC: 17 UNIT/L (ref 10–44)
ANION GAP (OHS): 7 MMOL/L (ref 8–16)
AST SERPL-CCNC: 16 UNIT/L (ref 11–45)
BASOPHILS # BLD AUTO: 0.05 K/UL
BASOPHILS NFR BLD AUTO: 0.8 %
BILIRUB SERPL-MCNC: 0.7 MG/DL (ref 0.1–1)
BUN SERPL-MCNC: 16 MG/DL (ref 6–20)
CALCIUM SERPL-MCNC: 9.5 MG/DL (ref 8.7–10.5)
CHLORIDE SERPL-SCNC: 105 MMOL/L (ref 95–110)
CHOLEST SERPL-MCNC: 197 MG/DL (ref 120–199)
CHOLEST/HDLC SERPL: 3 {RATIO} (ref 2–5)
CO2 SERPL-SCNC: 28 MMOL/L (ref 23–29)
CREAT SERPL-MCNC: 0.9 MG/DL (ref 0.5–1.4)
EAG (OHS): 105 MG/DL (ref 68–131)
ERYTHROCYTE [DISTWIDTH] IN BLOOD BY AUTOMATED COUNT: 13.2 % (ref 11.5–14.5)
GFR SERPLBLD CREATININE-BSD FMLA CKD-EPI: >60 ML/MIN/1.73/M2
GLUCOSE SERPL-MCNC: 96 MG/DL (ref 70–110)
HBA1C MFR BLD: 5.3 % (ref 4–5.6)
HCT VFR BLD AUTO: 43.4 % (ref 37–48.5)
HDLC SERPL-MCNC: 66 MG/DL (ref 40–75)
HDLC SERPL: 33.5 % (ref 20–50)
HGB BLD-MCNC: 14 GM/DL (ref 12–16)
IMM GRANULOCYTES # BLD AUTO: 0.02 K/UL (ref 0–0.04)
IMM GRANULOCYTES NFR BLD AUTO: 0.3 % (ref 0–0.5)
LDLC SERPL CALC-MCNC: 117 MG/DL (ref 63–159)
LYMPHOCYTES # BLD AUTO: 1.9 K/UL (ref 1–4.8)
MCH RBC QN AUTO: 29.9 PG (ref 27–31)
MCHC RBC AUTO-ENTMCNC: 32.3 G/DL (ref 32–36)
MCV RBC AUTO: 93 FL (ref 82–98)
NONHDLC SERPL-MCNC: 131 MG/DL
NUCLEATED RBC (/100WBC) (OHS): 0 /100 WBC
PLATELET # BLD AUTO: 279 K/UL (ref 150–450)
PMV BLD AUTO: 10.5 FL (ref 9.2–12.9)
POTASSIUM SERPL-SCNC: 4.9 MMOL/L (ref 3.5–5.1)
PROT SERPL-MCNC: 7.1 GM/DL (ref 6–8.4)
RBC # BLD AUTO: 4.68 M/UL (ref 4–5.4)
RELATIVE EOSINOPHIL (OHS): 3.6 %
RELATIVE LYMPHOCYTE (OHS): 31 % (ref 18–48)
RELATIVE MONOCYTE (OHS): 11.1 % (ref 4–15)
RELATIVE NEUTROPHIL (OHS): 53.2 % (ref 38–73)
SODIUM SERPL-SCNC: 140 MMOL/L (ref 136–145)
TRIGL SERPL-MCNC: 70 MG/DL (ref 30–150)
TSH SERPL-ACNC: 0.81 UIU/ML (ref 0.4–4)
WBC # BLD AUTO: 6.13 K/UL (ref 3.9–12.7)

## 2025-07-11 PROCEDURE — 83718 ASSAY OF LIPOPROTEIN: CPT

## 2025-07-11 PROCEDURE — 99999 PR PBB SHADOW E&M-EST. PATIENT-LVL IV: CPT | Mod: PBBFAC,,, | Performed by: FAMILY MEDICINE

## 2025-07-11 PROCEDURE — 36415 COLL VENOUS BLD VENIPUNCTURE: CPT | Mod: PO

## 2025-07-11 PROCEDURE — 83036 HEMOGLOBIN GLYCOSYLATED A1C: CPT

## 2025-07-11 PROCEDURE — 85025 COMPLETE CBC W/AUTO DIFF WBC: CPT

## 2025-07-11 PROCEDURE — 84443 ASSAY THYROID STIM HORMONE: CPT

## 2025-07-11 PROCEDURE — 82374 ASSAY BLOOD CARBON DIOXIDE: CPT

## 2025-07-11 NOTE — PROGRESS NOTES
Routine Office Visit     Patient Name: Brianna Coughlin    : 1965  MRN: [id number]      Assessment     Assessment & Plan    Z00.00 Annual physical exam    IMPRESSION:  - Assessed current antidepressant medication regimen and discussed weaning process. Evaluated reported foot discomfort, likely due to nerve impingement (superficial peroneal nerve). Noted slight swelling in salivary gland, likely residual effect from previous vaccination.    PLAN SUMMARY:  - Continue wearing shoes with good arch support  - Use ice and OTC anti-inflammatory medication (e.g., Advil) as needed for foot discomfort  - Take 1 tablet every other day for 2 weeks, then discontinue  - Restart medication and contact office for refill if symptoms return after weaning  - Continue current antidepressant prescription  - Complete ordered labs at the on-site lab    ANNUAL PHYSICAL EXAM:  - Ordered labs to be completed at the on-site lab.    LIFESTYLE CHANGES:  - Discussed the anatomy of foot nerves and its relation to foot discomfort.  - Patient to continue wearing shoes with good arch support and use ice and OTC anti-inflammatory medication (e.g., Advil) as needed for foot discomfort.  - Provided information on lymphatic system reactions to vaccinations and potential duration of swelling.  - Take 1 tablet every other day for 2 weeks, then discontinue.  - If symptoms return after weaning, patient can restart medication and contact office for refill without needing an appointment.  - Continued current antidepressant prescription to allow patient to have enough medication for weaning process when ready.         Problem List Items Addressed This Visit    None  Visit Diagnoses         Annual physical exam    -  Primary    Relevant Orders    Comprehensive Metabolic Panel    CBC Auto Differential    Hemoglobin A1C    Lipid Panel    TSH            Health Maintenance  reviewed.    ______________________________________________________________________    Chief Complaint  Chief Complaint   Patient presents with    Annual Exam       Brianna Coughlin is a 60 y.o. female with multiple medical diagnoses as listed in the medical history and problem list that presents for annual exam.  Pt is known to me with last appointment 7/10/2024 .        Subjective     History of Present Illness    CHIEF COMPLAINT:  Patient presents today for follow up    MENOPAUSAL SYMPTOMS:  She reports improvement in hot flashes since starting hormone patch. Currently experiencing occasional mild nocturnal warmth, significantly reduced from prior symptoms. She no longer experiences intense night sweats as she did before initiating patch treatment.    SLEEP:  She reports significant improvement in overall sleep quality with occasional difficulty sleeping approximately once per week. She experiences challenges falling asleep or early morning awakenings, such as waking at midnight after going to bed at 9:30 PM and feeling as though she has slept for hours when only two hours have passed.    PSYCHIATRIC:  She has been on antidepressant medication since April of last year and reports feeling good on the current dose, expressing satisfaction with medication management.    FOOT PAIN:  She experiences intermittent sharp sensations in her foot, described as a pulse jumping or stabbing sensation lasting less than 10 seconds, occurring sporadically throughout the day and occasionally waking her from sleep. She manages symptoms with Advil and ice application, which provides relief. She uses arch supports to mitigate nerve impingement. She has arthritis in the left foot and a known bone spur in her heel identified on previous X-ray, which is currently asymptomatic. She cannot walk barefoot and has difficulty wearing closed shoes or tennis shoes, but finds arch supports helpful in reducing sensory  "disturbances.    VACCINATION HISTORY:  She received pneumonia vaccination in December from Dr. Jackson. Four days post-vaccination, she developed unilateral salivary gland swelling, which improved with warm compresses and showers. She currently notes mild residual swelling but denies significant pain or other complications.      ROS:  General: -fever, -chills, -fatigue, -weight gain, -weight loss, +hot flashes, +difficulty falling asleep, +sleep disturbances, +difficulty staying asleep  Eyes: -vision changes, -redness, -discharge  ENT: -ear pain, -nasal congestion, -sore throat  Cardiovascular: -chest pain, -palpitations, -lower extremity edema  Respiratory: -cough, -shortness of breath  Gastrointestinal: -abdominal pain, -nausea, -vomiting, -diarrhea, -constipation, -blood in stool  Genitourinary: -dysuria, -hematuria, -frequency  Musculoskeletal: -joint pain, -muscle pain, +limb pain  Skin: -rash, -lesion  Neurological: -headache, -dizziness, -numbness, -tingling, +shooting pain sensation, +nerve pain  Psychiatric: -anxiety, -depression, -sleep difficulty  Head: +swollen glands           Objective     /82   Pulse 60   Temp 97.6 °F (36.4 °C) (Oral)   Ht 5' 4" (1.626 m)   Wt 79.3 kg (174 lb 13.2 oz)   LMP 02/24/2020 (Approximate)   SpO2 99%   BMI 30.01 kg/m²   Physical Exam  Vitals reviewed.   Constitutional:       Appearance: Normal appearance. She is well-developed.   HENT:      Head: Normocephalic and atraumatic.      Right Ear: External ear normal.      Left Ear: External ear normal.      Nose: Nose normal.      Mouth/Throat:      Mouth: Mucous membranes are moist.      Pharynx: Oropharynx is clear.   Eyes:      Extraocular Movements: Extraocular movements intact.      Conjunctiva/sclera: Conjunctivae normal.      Pupils: Pupils are equal, round, and reactive to light.   Cardiovascular:      Rate and Rhythm: Normal rate and regular rhythm.      Heart sounds: Normal heart sounds.   Pulmonary:      " Effort: Pulmonary effort is normal.      Breath sounds: Normal breath sounds.   Skin:     General: Skin is warm and dry.   Neurological:      Mental Status: She is alert and oriented to person, place, and time.             This note was generated with the assistance of ambient listening technology. Verbal consent was obtained by the patient and accompanying visitor(s) for the recording of patient appointment to facilitate this note. I attest to having reviewed and edited the generated note for accuracy, though some syntax or spelling errors may persist. Please contact the author of this note for any clarification.

## 2025-07-24 DIAGNOSIS — F41.9 ANXIETY: ICD-10-CM

## 2025-07-24 RX ORDER — ALPRAZOLAM 0.5 MG/1
TABLET ORAL
Qty: 30 TABLET | Refills: 0 | Status: SHIPPED | OUTPATIENT
Start: 2025-07-24

## 2025-07-24 NOTE — TELEPHONE ENCOUNTER
No care due was identified.  [facility] Embedded Care Due Messages. Reference number: [id number].   7/24/2025 11:07:52 AM CDT

## 2025-08-03 DIAGNOSIS — M19.072 ARTHRITIS OF LEFT MIDFOOT: ICD-10-CM

## 2025-08-04 DIAGNOSIS — I10 ESSENTIAL HYPERTENSION: ICD-10-CM

## 2025-08-04 RX ORDER — NAPROXEN 500 MG/1
500 TABLET ORAL EVERY 12 HOURS PRN
Qty: 60 TABLET | Refills: 0 | Status: SHIPPED | OUTPATIENT
Start: 2025-08-04 | End: 2025-09-03

## 2025-08-04 NOTE — TELEPHONE ENCOUNTER
No care due was identified.  [facility] Embedded Care Due Messages. Reference number: [id number].   8/04/2025 5:26:12 PM CDT

## 2025-08-05 RX ORDER — ATENOLOL 100 MG/1
100 TABLET ORAL DAILY
Qty: 90 TABLET | Refills: 3 | Status: SHIPPED | OUTPATIENT
Start: 2025-08-05

## 2025-08-05 NOTE — TELEPHONE ENCOUNTER
Refill Decision Note   Brianna Ced  is requesting a refill authorization.    Brief Assessment and Rationale for Refill:   Approve       Medication Therapy Plan:         Comments:     Note composed:1:46 PM 08/05/2025

## 2025-08-27 ENCOUNTER — OFFICE VISIT (OUTPATIENT)
Facility: CLINIC | Age: 60
End: 2025-08-27
Attending: OBSTETRICS & GYNECOLOGY
Payer: COMMERCIAL

## 2025-08-27 VITALS
HEIGHT: 64 IN | WEIGHT: 176.81 LBS | HEART RATE: 60 BPM | DIASTOLIC BLOOD PRESSURE: 84 MMHG | SYSTOLIC BLOOD PRESSURE: 127 MMHG | BODY MASS INDEX: 30.19 KG/M2

## 2025-08-27 DIAGNOSIS — N95.8 GENITOURINARY SYNDROME OF MENOPAUSE: ICD-10-CM

## 2025-08-27 DIAGNOSIS — Z01.419 ENCOUNTER FOR GYNECOLOGICAL EXAMINATION WITHOUT ABNORMAL FINDING: Primary | ICD-10-CM

## 2025-08-27 DIAGNOSIS — N95.1 MENOPAUSAL SYNDROME: ICD-10-CM

## 2025-08-27 DIAGNOSIS — Z80.41 FAMILY HISTORY OF OVARIAN CANCER: ICD-10-CM

## 2025-08-27 PROCEDURE — 3074F SYST BP LT 130 MM HG: CPT | Mod: CPTII,S$GLB,, | Performed by: OBSTETRICS & GYNECOLOGY

## 2025-08-27 PROCEDURE — 3079F DIAST BP 80-89 MM HG: CPT | Mod: CPTII,S$GLB,, | Performed by: OBSTETRICS & GYNECOLOGY

## 2025-08-27 PROCEDURE — 1159F MED LIST DOCD IN RCRD: CPT | Mod: CPTII,S$GLB,, | Performed by: OBSTETRICS & GYNECOLOGY

## 2025-08-27 PROCEDURE — 99999 PR PBB SHADOW E&M-EST. PATIENT-LVL IV: CPT | Mod: PBBFAC,,, | Performed by: OBSTETRICS & GYNECOLOGY

## 2025-08-27 PROCEDURE — 3044F HG A1C LEVEL LT 7.0%: CPT | Mod: CPTII,S$GLB,, | Performed by: OBSTETRICS & GYNECOLOGY

## 2025-08-27 PROCEDURE — 1160F RVW MEDS BY RX/DR IN RCRD: CPT | Mod: CPTII,S$GLB,, | Performed by: OBSTETRICS & GYNECOLOGY

## 2025-08-27 PROCEDURE — 99396 PREV VISIT EST AGE 40-64: CPT | Mod: S$GLB,,, | Performed by: OBSTETRICS & GYNECOLOGY

## 2025-08-27 PROCEDURE — 3008F BODY MASS INDEX DOCD: CPT | Mod: CPTII,S$GLB,, | Performed by: OBSTETRICS & GYNECOLOGY

## 2025-08-27 RX ORDER — ESTRADIOL 0.1 MG/G
1 CREAM VAGINAL
Qty: 42 G | Refills: 3 | Status: SHIPPED | OUTPATIENT
Start: 2025-08-31

## 2025-08-27 RX ORDER — PROGESTERONE 100 MG/1
100 CAPSULE ORAL NIGHTLY
Qty: 90 CAPSULE | Refills: 3 | Status: SHIPPED | OUTPATIENT
Start: 2025-08-27 | End: 2026-08-27

## 2025-08-27 RX ORDER — ESTRADIOL 0.05 MG/D
1 FILM, EXTENDED RELEASE TRANSDERMAL
Qty: 24 PATCH | Refills: 3 | Status: SHIPPED | OUTPATIENT
Start: 2025-08-28

## 2025-09-02 DIAGNOSIS — M19.072 ARTHRITIS OF LEFT MIDFOOT: ICD-10-CM

## 2025-09-02 RX ORDER — NAPROXEN 500 MG/1
500 TABLET ORAL EVERY 12 HOURS PRN
Qty: 60 TABLET | Refills: 0 | Status: SHIPPED | OUTPATIENT
Start: 2025-09-02 | End: 2025-10-02